# Patient Record
Sex: MALE | NOT HISPANIC OR LATINO | Employment: OTHER | URBAN - METROPOLITAN AREA
[De-identification: names, ages, dates, MRNs, and addresses within clinical notes are randomized per-mention and may not be internally consistent; named-entity substitution may affect disease eponyms.]

---

## 2023-05-19 ENCOUNTER — HOSPITAL ENCOUNTER (OUTPATIENT)
Dept: DATA CONVERSION | Facility: HOSPITAL | Age: 83
End: 2023-05-19
Attending: ORTHOPAEDIC SURGERY | Admitting: ORTHOPAEDIC SURGERY
Payer: MEDICARE

## 2023-05-19 DIAGNOSIS — G56.03 CARPAL TUNNEL SYNDROME, BILATERAL UPPER LIMBS: ICD-10-CM

## 2023-05-19 DIAGNOSIS — G56.01 CARPAL TUNNEL SYNDROME, RIGHT UPPER LIMB: ICD-10-CM

## 2023-06-21 ENCOUNTER — OFFICE VISIT (OUTPATIENT)
Dept: PRIMARY CARE | Facility: CLINIC | Age: 83
End: 2023-06-21
Payer: MEDICARE

## 2023-06-21 VITALS
OXYGEN SATURATION: 93 % | BODY MASS INDEX: 36.33 KG/M2 | DIASTOLIC BLOOD PRESSURE: 70 MMHG | WEIGHT: 246 LBS | SYSTOLIC BLOOD PRESSURE: 135 MMHG | HEART RATE: 108 BPM

## 2023-06-21 DIAGNOSIS — I10 ESSENTIAL HYPERTENSION: ICD-10-CM

## 2023-06-21 DIAGNOSIS — M79.602 PAIN IN BOTH UPPER EXTREMITIES: Primary | ICD-10-CM

## 2023-06-21 DIAGNOSIS — M79.601 PAIN IN BOTH UPPER EXTREMITIES: Primary | ICD-10-CM

## 2023-06-21 DIAGNOSIS — E78.5 HYPERLIPIDEMIA, UNSPECIFIED HYPERLIPIDEMIA TYPE: ICD-10-CM

## 2023-06-21 PROBLEM — I89.0 LYMPHEDEMA OF BOTH LOWER EXTREMITIES: Status: ACTIVE | Noted: 2023-06-21

## 2023-06-21 PROBLEM — G56.02 CARPAL TUNNEL SYNDROME OF LEFT WRIST: Status: ACTIVE | Noted: 2023-06-21

## 2023-06-21 PROBLEM — I49.9 ARRHYTHMIA: Status: ACTIVE | Noted: 2023-06-21

## 2023-06-21 PROBLEM — F41.9 ANXIETY: Status: ACTIVE | Noted: 2018-11-12

## 2023-06-21 PROBLEM — E87.6 HYPOKALEMIA: Status: ACTIVE | Noted: 2018-11-12

## 2023-06-21 PROBLEM — R20.0 BILATERAL HAND NUMBNESS: Status: RESOLVED | Noted: 2023-06-21 | Resolved: 2023-06-21

## 2023-06-21 PROBLEM — M79.89 RIGHT LEG SWELLING: Status: ACTIVE | Noted: 2023-06-21

## 2023-06-21 PROBLEM — I25.10 CORONARY ARTERY DISEASE: Status: ACTIVE | Noted: 2023-06-21

## 2023-06-21 PROBLEM — G56.01 CARPAL TUNNEL SYNDROME OF RIGHT WRIST: Status: ACTIVE | Noted: 2023-06-21

## 2023-06-21 PROBLEM — R20.0 BILATERAL HAND NUMBNESS: Status: ACTIVE | Noted: 2023-06-21

## 2023-06-21 PROBLEM — I89.0 LYMPHEDEMA: Status: ACTIVE | Noted: 2023-06-21

## 2023-06-21 PROBLEM — C85.90 NON-HODGKIN'S LYMPHOMA (MULTI): Status: ACTIVE | Noted: 2019-07-29

## 2023-06-21 PROBLEM — E55.9 VITAMIN D DEFICIENCY: Status: ACTIVE | Noted: 2018-11-12

## 2023-06-21 PROBLEM — R74.8 ELEVATED LIVER ENZYMES: Status: ACTIVE | Noted: 2018-11-12

## 2023-06-21 PROCEDURE — 99214 OFFICE O/P EST MOD 30 MIN: CPT | Performed by: INTERNAL MEDICINE

## 2023-06-21 PROCEDURE — 1036F TOBACCO NON-USER: CPT | Performed by: INTERNAL MEDICINE

## 2023-06-21 PROCEDURE — 3075F SYST BP GE 130 - 139MM HG: CPT | Performed by: INTERNAL MEDICINE

## 2023-06-21 PROCEDURE — 3078F DIAST BP <80 MM HG: CPT | Performed by: INTERNAL MEDICINE

## 2023-06-21 PROCEDURE — 1160F RVW MEDS BY RX/DR IN RCRD: CPT | Performed by: INTERNAL MEDICINE

## 2023-06-21 PROCEDURE — 1159F MED LIST DOCD IN RCRD: CPT | Performed by: INTERNAL MEDICINE

## 2023-06-21 RX ORDER — CLORAZEPATE DIPOTASSIUM 15 MG/1
TABLET ORAL
COMMUNITY
Start: 2021-05-17 | End: 2023-10-16 | Stop reason: SDUPTHER

## 2023-06-21 RX ORDER — LOSARTAN POTASSIUM 50 MG/1
TABLET ORAL
COMMUNITY
End: 2023-08-10

## 2023-06-21 RX ORDER — ATORVASTATIN CALCIUM 40 MG/1
TABLET, FILM COATED ORAL
COMMUNITY
Start: 2021-11-04 | End: 2024-02-15 | Stop reason: SDUPTHER

## 2023-06-21 RX ORDER — PREDNISONE 20 MG/1
20 TABLET ORAL 2 TIMES DAILY
Qty: 10 TABLET | Refills: 0 | Status: SHIPPED | OUTPATIENT
Start: 2023-06-21 | End: 2023-06-25 | Stop reason: SDUPTHER

## 2023-06-21 NOTE — PROGRESS NOTES
Subjective   Patient ID: Angel Garibay is a 83 y.o. male who presents for Insomnia and pain in both arms .    HPI     S/p b/l carpal tunnel surgery on 5/19/23-numbness and wrist pain has since improved  Now reports b/l upper ext pain, goes from the shoulder to his wrist x 2-3 weeks  Moving his arms makes the pain worse  No numbness or weakness  Went to the ED, was given Percocet which helped      Review of Systems   All other systems reviewed and are negative.      Objective   /80   Pulse 108   Wt 112 kg (246 lb)   SpO2 93%   BMI 36.33 kg/m²     Physical Exam  Constitutional:       Appearance: Normal appearance.   HENT:      Head: Normocephalic and atraumatic.   Cardiovascular:      Rate and Rhythm: Normal rate and regular rhythm.      Heart sounds: Normal heart sounds. No murmur heard.     No gallop.   Pulmonary:      Effort: Pulmonary effort is normal. No respiratory distress.      Breath sounds: No wheezing or rales.   Skin:     General: Skin is warm and dry.      Findings: No rash.   Neurological:      Mental Status: He is alert and oriented to person, place, and time. Mental status is at baseline.   Psychiatric:         Mood and Affect: Mood normal.         Behavior: Behavior normal.         Assessment/Plan       #b/l UE pain  -?shoulder etiology. Rx prednisone 20mg BID x 5 days  -Refer to ortho     #HTN  -Well contolled. Con losartan 50mg daily     #HLD  -Cont atorvastatin 40mg daily , check lipid panel     #Anxiety   -Cont clorazepate -no rx today     FBW ordered     RTC 6 mo

## 2023-06-21 NOTE — PATIENT INSTRUCTIONS
Take prednisone   Ortho [phone]    Please complete fasting blood work. Fast for 10 hours, black coffee and water the morning of labs are OK.

## 2023-06-22 ENCOUNTER — LAB (OUTPATIENT)
Dept: LAB | Facility: LAB | Age: 83
End: 2023-06-22
Payer: MEDICARE

## 2023-06-22 DIAGNOSIS — E78.5 HYPERLIPIDEMIA, UNSPECIFIED HYPERLIPIDEMIA TYPE: ICD-10-CM

## 2023-06-22 LAB
ALANINE AMINOTRANSFERASE (SGPT) (U/L) IN SER/PLAS: 26 U/L (ref 10–52)
ALBUMIN (G/DL) IN SER/PLAS: 3.9 G/DL (ref 3.4–5)
ALKALINE PHOSPHATASE (U/L) IN SER/PLAS: 113 U/L (ref 33–136)
ANION GAP IN SER/PLAS: 13 MMOL/L (ref 10–20)
ASPARTATE AMINOTRANSFERASE (SGOT) (U/L) IN SER/PLAS: 21 U/L (ref 9–39)
BILIRUBIN TOTAL (MG/DL) IN SER/PLAS: 0.7 MG/DL (ref 0–1.2)
CALCIUM (MG/DL) IN SER/PLAS: 9.1 MG/DL (ref 8.6–10.3)
CARBON DIOXIDE, TOTAL (MMOL/L) IN SER/PLAS: 26 MMOL/L (ref 21–32)
CHLORIDE (MMOL/L) IN SER/PLAS: 103 MMOL/L (ref 98–107)
CHOLESTEROL (MG/DL) IN SER/PLAS: 193 MG/DL (ref 0–199)
CHOLESTEROL IN HDL (MG/DL) IN SER/PLAS: 67.9 MG/DL
CHOLESTEROL/HDL RATIO: 2.8
CREATININE (MG/DL) IN SER/PLAS: 0.87 MG/DL (ref 0.5–1.3)
ERYTHROCYTE DISTRIBUTION WIDTH (RATIO) BY AUTOMATED COUNT: 12.3 % (ref 11.5–14.5)
ERYTHROCYTE MEAN CORPUSCULAR HEMOGLOBIN CONCENTRATION (G/DL) BY AUTOMATED: 33.2 G/DL (ref 32–36)
ERYTHROCYTE MEAN CORPUSCULAR VOLUME (FL) BY AUTOMATED COUNT: 101 FL (ref 80–100)
ERYTHROCYTES (10*6/UL) IN BLOOD BY AUTOMATED COUNT: 3.75 X10E12/L (ref 4.5–5.9)
GFR MALE: 85 ML/MIN/1.73M2
GLUCOSE (MG/DL) IN SER/PLAS: 98 MG/DL (ref 74–99)
HEMATOCRIT (%) IN BLOOD BY AUTOMATED COUNT: 37.7 % (ref 41–52)
HEMOGLOBIN (G/DL) IN BLOOD: 12.5 G/DL (ref 13.5–17.5)
LDL: 111 MG/DL (ref 0–99)
LEUKOCYTES (10*3/UL) IN BLOOD BY AUTOMATED COUNT: 8.7 X10E9/L (ref 4.4–11.3)
PLATELETS (10*3/UL) IN BLOOD AUTOMATED COUNT: 318 X10E9/L (ref 150–450)
POTASSIUM (MMOL/L) IN SER/PLAS: 3.8 MMOL/L (ref 3.5–5.3)
PROTEIN TOTAL: 7 G/DL (ref 6.4–8.2)
SODIUM (MMOL/L) IN SER/PLAS: 138 MMOL/L (ref 136–145)
TRIGLYCERIDE (MG/DL) IN SER/PLAS: 73 MG/DL (ref 0–149)
UREA NITROGEN (MG/DL) IN SER/PLAS: 11 MG/DL (ref 6–23)
VLDL: 15 MG/DL (ref 0–40)

## 2023-06-22 PROCEDURE — 85027 COMPLETE CBC AUTOMATED: CPT

## 2023-06-22 PROCEDURE — 80053 COMPREHEN METABOLIC PANEL: CPT

## 2023-06-22 PROCEDURE — 80061 LIPID PANEL: CPT

## 2023-06-22 PROCEDURE — 36415 COLL VENOUS BLD VENIPUNCTURE: CPT

## 2023-06-25 DIAGNOSIS — D64.9 ANEMIA, UNSPECIFIED TYPE: Primary | ICD-10-CM

## 2023-06-25 DIAGNOSIS — M79.602 PAIN IN BOTH UPPER EXTREMITIES: ICD-10-CM

## 2023-06-25 DIAGNOSIS — M79.601 PAIN IN BOTH UPPER EXTREMITIES: ICD-10-CM

## 2023-06-25 RX ORDER — PREDNISONE 20 MG/1
20 TABLET ORAL 2 TIMES DAILY
Qty: 10 TABLET | Refills: 0 | Status: SHIPPED | OUTPATIENT
Start: 2023-06-25 | End: 2023-06-30

## 2023-07-28 ENCOUNTER — TELEPHONE (OUTPATIENT)
Dept: PRIMARY CARE | Facility: CLINIC | Age: 83
End: 2023-07-28
Payer: MEDICARE

## 2023-07-28 DIAGNOSIS — M79.602 PAIN IN BOTH UPPER EXTREMITIES: Primary | ICD-10-CM

## 2023-07-28 DIAGNOSIS — M79.601 PAIN IN BOTH UPPER EXTREMITIES: Primary | ICD-10-CM

## 2023-07-28 DIAGNOSIS — M79.601 PAIN IN BOTH UPPER EXTREMITIES: ICD-10-CM

## 2023-07-28 DIAGNOSIS — M79.602 PAIN IN BOTH UPPER EXTREMITIES: ICD-10-CM

## 2023-07-28 DIAGNOSIS — Z79.899 MEDICATION MANAGEMENT: ICD-10-CM

## 2023-07-28 RX ORDER — PREDNISONE 20 MG/1
20 TABLET ORAL 2 TIMES DAILY
Qty: 10 TABLET | Refills: 0 | Status: SHIPPED | OUTPATIENT
Start: 2023-07-28 | End: 2023-08-07 | Stop reason: ALTCHOICE

## 2023-07-28 RX ORDER — CLORAZEPATE DIPOTASSIUM 15 MG/1
TABLET ORAL
Qty: 90 TABLET | Refills: 0 | Status: CANCELLED | OUTPATIENT
Start: 2023-07-28

## 2023-07-28 NOTE — TELEPHONE ENCOUNTER
Requested Prescriptions     Pending Prescriptions Disp Refills    clorazepate (Tranxene) 15 mg tablet 90 tablet 0     Sig: clorazepate dipotassium 15 mg tablet   take 1 tablet by mouth once daily if needed

## 2023-08-03 PROBLEM — E78.00 PURE HYPERCHOLESTEROLEMIA: Status: ACTIVE | Noted: 2023-08-03

## 2023-08-03 RX ORDER — ASPIRIN 81 MG/1
1 TABLET ORAL DAILY
COMMUNITY
Start: 2021-11-04 | End: 2023-10-16 | Stop reason: ALTCHOICE

## 2023-08-03 RX ORDER — GABAPENTIN 100 MG/1
100 CAPSULE ORAL NIGHTLY
COMMUNITY
Start: 2023-06-26 | End: 2023-10-16 | Stop reason: ALTCHOICE

## 2023-08-07 ENCOUNTER — OFFICE VISIT (OUTPATIENT)
Dept: PRIMARY CARE | Facility: CLINIC | Age: 83
End: 2023-08-07
Payer: MEDICARE

## 2023-08-07 VITALS
DIASTOLIC BLOOD PRESSURE: 76 MMHG | WEIGHT: 241 LBS | SYSTOLIC BLOOD PRESSURE: 135 MMHG | HEIGHT: 68 IN | HEART RATE: 84 BPM | OXYGEN SATURATION: 93 % | BODY MASS INDEX: 36.53 KG/M2

## 2023-08-07 DIAGNOSIS — G56.03 BILATERAL CARPAL TUNNEL SYNDROME: ICD-10-CM

## 2023-08-07 DIAGNOSIS — F41.9 ANXIETY: ICD-10-CM

## 2023-08-07 DIAGNOSIS — G47.00 INSOMNIA, UNSPECIFIED TYPE: ICD-10-CM

## 2023-08-07 DIAGNOSIS — Z79.899 ENCOUNTER FOR MEDICATION MANAGEMENT: ICD-10-CM

## 2023-08-07 DIAGNOSIS — Z00.00 ROUTINE GENERAL MEDICAL EXAMINATION AT HEALTH CARE FACILITY: Primary | ICD-10-CM

## 2023-08-07 PROCEDURE — 80307 DRUG TEST PRSMV CHEM ANLYZR: CPT

## 2023-08-07 PROCEDURE — 1159F MED LIST DOCD IN RCRD: CPT | Performed by: INTERNAL MEDICINE

## 2023-08-07 PROCEDURE — G0439 PPPS, SUBSEQ VISIT: HCPCS | Performed by: INTERNAL MEDICINE

## 2023-08-07 PROCEDURE — 99214 OFFICE O/P EST MOD 30 MIN: CPT | Performed by: INTERNAL MEDICINE

## 2023-08-07 PROCEDURE — 3078F DIAST BP <80 MM HG: CPT | Performed by: INTERNAL MEDICINE

## 2023-08-07 PROCEDURE — 1170F FXNL STATUS ASSESSED: CPT | Performed by: INTERNAL MEDICINE

## 2023-08-07 PROCEDURE — 80373 DRUG SCREENING TRAMADOL: CPT

## 2023-08-07 PROCEDURE — 1160F RVW MEDS BY RX/DR IN RCRD: CPT | Performed by: INTERNAL MEDICINE

## 2023-08-07 PROCEDURE — 80361 OPIATES 1 OR MORE: CPT

## 2023-08-07 PROCEDURE — 1036F TOBACCO NON-USER: CPT | Performed by: INTERNAL MEDICINE

## 2023-08-07 PROCEDURE — 80368 SEDATIVE HYPNOTICS: CPT

## 2023-08-07 PROCEDURE — 80358 DRUG SCREENING METHADONE: CPT

## 2023-08-07 PROCEDURE — 80354 DRUG SCREENING FENTANYL: CPT

## 2023-08-07 PROCEDURE — 3075F SYST BP GE 130 - 139MM HG: CPT | Performed by: INTERNAL MEDICINE

## 2023-08-07 PROCEDURE — 80365 DRUG SCREENING OXYCODONE: CPT

## 2023-08-07 PROCEDURE — 80346 BENZODIAZEPINES1-12: CPT

## 2023-08-07 RX ORDER — PREDNISONE 10 MG/1
10 TABLET ORAL DAILY
Qty: 10 TABLET | Refills: 0 | Status: SHIPPED | OUTPATIENT
Start: 2023-08-07 | End: 2023-10-16 | Stop reason: ALTCHOICE

## 2023-08-07 RX ORDER — LORAZEPAM 0.5 MG/1
0.5 TABLET ORAL 2 TIMES DAILY PRN
Qty: 60 TABLET | Refills: 0 | Status: SHIPPED | OUTPATIENT
Start: 2023-08-07 | End: 2023-10-16 | Stop reason: ALTCHOICE

## 2023-08-07 RX ORDER — PREDNISONE 10 MG/1
10 TABLET ORAL DAILY
COMMUNITY
End: 2023-08-07 | Stop reason: SDUPTHER

## 2023-08-07 ASSESSMENT — ANXIETY QUESTIONNAIRES
1. FEELING NERVOUS, ANXIOUS, OR ON EDGE: SEVERAL DAYS
4. TROUBLE RELAXING: MORE THAN HALF THE DAYS
IF YOU CHECKED OFF ANY PROBLEMS ON THIS QUESTIONNAIRE, HOW DIFFICULT HAVE THESE PROBLEMS MADE IT FOR YOU TO DO YOUR WORK, TAKE CARE OF THINGS AT HOME, OR GET ALONG WITH OTHER PEOPLE: SOMEWHAT DIFFICULT
2. NOT BEING ABLE TO STOP OR CONTROL WORRYING: NOT AT ALL
3. WORRYING TOO MUCH ABOUT DIFFERENT THINGS: MORE THAN HALF THE DAYS
7. FEELING AFRAID AS IF SOMETHING AWFUL MIGHT HAPPEN: SEVERAL DAYS
6. BECOMING EASILY ANNOYED OR IRRITABLE: SEVERAL DAYS
GAD7 TOTAL SCORE: 7
5. BEING SO RESTLESS THAT IT IS HARD TO SIT STILL: NOT AT ALL

## 2023-08-07 ASSESSMENT — PATIENT HEALTH QUESTIONNAIRE - PHQ9
2. FEELING DOWN, DEPRESSED OR HOPELESS: NOT AT ALL
SUM OF ALL RESPONSES TO PHQ9 QUESTIONS 1 AND 2: 0
1. LITTLE INTEREST OR PLEASURE IN DOING THINGS: NOT AT ALL

## 2023-08-07 ASSESSMENT — ACTIVITIES OF DAILY LIVING (ADL)
DRESSING: INDEPENDENT
BATHING: INDEPENDENT
GROCERY_SHOPPING: INDEPENDENT
MANAGING_FINANCES: INDEPENDENT
DOING_HOUSEWORK: INDEPENDENT
TAKING_MEDICATION: INDEPENDENT

## 2023-08-07 NOTE — PROGRESS NOTES
"Subjective   Patient ID: Angel Garibay is a 83 y.o. male who presents for Medicare Annual Wellness Visit Subsequent.    HPI     Review of Systems    Objective   /76   Pulse 84   Ht 1.727 m (5' 8\")   Wt 109 kg (241 lb)   SpO2 93%   BMI 36.64 kg/m²     Physical Exam    Assessment/Plan          "

## 2023-08-07 NOTE — PATIENT INSTRUCTIONS
Lets try lorazepam 0.5mg twice daily instead of clorazepate - let me know how this works   Get nonfasting labs to investigate anemia   Prednisone sent to [facility]     Come back 3 months

## 2023-08-07 NOTE — PROGRESS NOTES
"Subjective   Reason for Visit: Angel Garibay is an 83 y.o. male here for a Medicare Wellness visit.             HPI    Patient Care Team:  Wilbur Jurado,  as PCP - General     Needs a refill on clorazepate. Has been taking for 30+ years. Takes for anxiety and sleep. Typically takes 1 tablet daily. No panic attacks. Great.y helps with his sleep and improves his quality of life. No med side effects. He does report the medication is expensive.     Requesting new prenisone rx for carpal tunnel     Benzodiazepines:  What is the patient's goal of therapy? Improve anxiety and insomnia  Is this being achieved with current treatment? yes    GEORGETTE-7:  Over the last 2 weeks, how often have you been bothered by any of the following problems?  Feeling nervous, anxious, or on edge: 1  Not being able to stop or control worryin  Worrying too much about different things: 2  Trouble relaxin  Being so restless that it is hard to sit still: 0  Becoming easily annoyed or irritable: 1  Feeling afraid as if something awful might happen: 1  GEORGETTE-7 Total Score: 7        Activities of Daily Living:   Is your overall impression that this patient is benefiting (symptom reduction outweighs side effects) from benzodiazepine therapy? Yes     1. Physical Functioning: Same  2. Family Relationship: Same  3. Social Relationship: Same  4. Mood: Same  5. Sleep Patterns: Same  6. Overall Function: Same    Review of Systems   All other systems reviewed and are negative.      Objective   Vitals:  /76   Pulse 84   Ht 1.727 m (5' 8\")   Wt 109 kg (241 lb)   SpO2 93%   BMI 36.64 kg/m²       Physical Exam  Constitutional:       Appearance: Normal appearance.   HENT:      Head: Normocephalic and atraumatic.   Cardiovascular:      Rate and Rhythm: Normal rate and regular rhythm.      Heart sounds: Normal heart sounds. No murmur heard.     No gallop.   Pulmonary:      Effort: Pulmonary effort is normal. No respiratory distress.      Breath " sounds: No wheezing or rales.   Skin:     General: Skin is warm and dry.      Findings: No rash.   Neurological:      Mental Status: He is alert and oriented to person, place, and time. Mental status is at baseline.   Psychiatric:         Mood and Affect: Mood normal.         Behavior: Behavior normal.         Assessment/Plan   #Anxiety and insomnia   -GAD7: 7  -Due to long half life of clorazepate and cost, will transition patient to lorazepam 0.5mg BID  -UDS and CSA today   -I have personally reviewed the OARRS report for . This report is scanned into the electronic medical record. I have considered the risks of abuse, dependence, addiction and diversion.    #Carpal tunnel  -Rx prednisone 10mg daily x 10 days then stop     Reminded patient to complete aneima labs     RTC 3 mo

## 2023-08-09 DIAGNOSIS — I10 ESSENTIAL HYPERTENSION: ICD-10-CM

## 2023-08-10 LAB
6-ACETYLMORPHINE: <25 NG/ML
7-AMINOCLONAZEPAM: <25 NG/ML
ALPHA-HYDROXYALPRAZOLAM: <25 NG/ML
ALPHA-HYDROXYMIDAZOLAM: <25 NG/ML
ALPRAZOLAM: <25 NG/ML
AMPHETAMINE (PRESENCE) IN URINE BY SCREEN METHOD: ABNORMAL
BARBITURATES PRESENCE IN URINE BY SCREEN METHOD: ABNORMAL
CANNABINOIDS IN URINE BY SCREEN METHOD: ABNORMAL
CHLORDIAZEPOXIDE: <25 NG/ML
CLONAZEPAM: <25 NG/ML
COCAINE (PRESENCE) IN URINE BY SCREEN METHOD: ABNORMAL
CODEINE: <50 NG/ML
CREATINE, URINE FOR DRUG: 122.8 MG/DL
DIAZEPAM: <25 NG/ML
DRUG SCREEN COMMENT URINE: ABNORMAL
EDDP: <25 NG/ML
FENTANYL CONFIRMATION, URINE: <2.5 NG/ML
HYDROCODONE: <25 NG/ML
HYDROMORPHONE: <25 NG/ML
LORAZEPAM: <25 NG/ML
METHADONE CONFIRMATION,URINE: <25 NG/ML
MIDAZOLAM: <25 NG/ML
MORPHINE URINE: <50 NG/ML
NORDIAZEPAM: 35 NG/ML
NORFENTANYL: <2.5 NG/ML
NORHYDROCODONE: <25 NG/ML
NOROXYCODONE: <25 NG/ML
O-DESMETHYLTRAMADOL: <50 NG/ML
OXAZEPAM: 991 NG/ML
OXYCODONE: <25 NG/ML
OXYMORPHONE: <25 NG/ML
PHENCYCLIDINE (PRESENCE) IN URINE BY SCREEN METHOD: ABNORMAL
TEMAZEPAM: <25 NG/ML
TRAMADOL: <50 NG/ML
ZOLPIDEM METABOLITE (ZCA): <25 NG/ML
ZOLPIDEM: <25 NG/ML

## 2023-08-10 RX ORDER — LOSARTAN POTASSIUM 50 MG/1
50 TABLET ORAL DAILY
Qty: 90 TABLET | Refills: 1 | Status: SHIPPED | OUTPATIENT
Start: 2023-08-10 | End: 2023-12-21

## 2023-09-30 NOTE — H&P
History & Physical Reviewed:   I have reviewed the History and Physical dated:  01-May-2023   History and Physical reviewed and relevant findings noted. Patient examined to review pertinent physical  findings.: No significant changes   Home Medications Reviewed: no changes noted   Allergies Reviewed: no changes noted       ERAS (Enhanced Recovery After Surgery):  ·  ERAS Patient: no     Consent:   COVID-19 Consent:  ·  COVID-19 Risk Consent Surgeon has reviewed key risks related to the risk of cristhian COVID-19 and if they contract COVID-19 what the risks are.     Attestation:   Note Completion:  I am a:  Resident/Fellow   Attending Attestation I saw and evaluated the patient.  I personally obtained the key and critical portions of the history and physical exam or was physically present for key and  critical portions performed by the resident/fellow. I reviewed the resident/fellow?s documentation and discussed the patient with the resident/fellow.  I agree with the resident/fellow?s medical decision making as documented in the note.     I personally evaluated the patient on 19-May-2023         Electronic Signatures:  Zhao [email or web address] (Resident))  (Signed 19-May-2023 06:26)   Authored: History & Physical Reviewed, ERAS, Consent,  Note Completion  Ryan Gordillo)  (Signed 19-May-2023 15:49)   Authored: Note Completion   Co-Signer: History & Physical Reviewed, ERAS, Consent, Note Completion      Last Updated: 19-May-2023 15:49 by Ryan Gordillo)

## 2023-10-16 ENCOUNTER — OFFICE VISIT (OUTPATIENT)
Dept: PRIMARY CARE | Facility: CLINIC | Age: 83
End: 2023-10-16
Payer: MEDICARE

## 2023-10-16 VITALS
DIASTOLIC BLOOD PRESSURE: 72 MMHG | BODY MASS INDEX: 38.47 KG/M2 | WEIGHT: 253 LBS | HEART RATE: 106 BPM | SYSTOLIC BLOOD PRESSURE: 133 MMHG | OXYGEN SATURATION: 96 %

## 2023-10-16 DIAGNOSIS — G47.00 INSOMNIA, UNSPECIFIED TYPE: Primary | ICD-10-CM

## 2023-10-16 DIAGNOSIS — I10 ESSENTIAL HYPERTENSION: ICD-10-CM

## 2023-10-16 PROCEDURE — 1159F MED LIST DOCD IN RCRD: CPT | Performed by: INTERNAL MEDICINE

## 2023-10-16 PROCEDURE — 1036F TOBACCO NON-USER: CPT | Performed by: INTERNAL MEDICINE

## 2023-10-16 PROCEDURE — 3078F DIAST BP <80 MM HG: CPT | Performed by: INTERNAL MEDICINE

## 2023-10-16 PROCEDURE — 1160F RVW MEDS BY RX/DR IN RCRD: CPT | Performed by: INTERNAL MEDICINE

## 2023-10-16 PROCEDURE — 99214 OFFICE O/P EST MOD 30 MIN: CPT | Performed by: INTERNAL MEDICINE

## 2023-10-16 PROCEDURE — 3075F SYST BP GE 130 - 139MM HG: CPT | Performed by: INTERNAL MEDICINE

## 2023-10-16 RX ORDER — CLORAZEPATE DIPOTASSIUM 15 MG/1
TABLET ORAL
Qty: 90 TABLET | Refills: 0 | Status: SHIPPED | OUTPATIENT
Start: 2023-10-16 | End: 2024-01-03 | Stop reason: ALTCHOICE

## 2023-10-16 RX ORDER — METOPROLOL SUCCINATE 50 MG/1
TABLET, EXTENDED RELEASE ORAL
COMMUNITY
End: 2023-10-16 | Stop reason: SDUPTHER

## 2023-10-16 RX ORDER — METOPROLOL SUCCINATE 50 MG/1
50 TABLET, EXTENDED RELEASE ORAL DAILY
Qty: 90 TABLET | Refills: 3 | Status: SHIPPED | OUTPATIENT
Start: 2023-10-16 | End: 2023-12-14 | Stop reason: ALTCHOICE

## 2023-10-16 NOTE — PROGRESS NOTES
Subjective   Patient ID: Angel Garibay is a 83 y.o. male who presents for Follow-up (3 month follow up).  Started metoprolol 2 weeks ago due to hypertension      HPI     Reports lorazepam was not helpful for anxiety and insomnia. Is now take chlorazepate again which is helpful for his sleep. Greatly improves his quality of life. No memory loss or balance issues. No other med side effects. No signs of abuse or misuse     Starting taking Toprol for BP --bp has been good     Review of Systems   All other systems reviewed and are negative.      Objective   There were no vitals taken for this visit.    Physical Exam  Constitutional:       Appearance: Normal appearance.   HENT:      Head: Normocephalic and atraumatic.   Cardiovascular:      Rate and Rhythm: Normal rate and regular rhythm.      Heart sounds: Normal heart sounds. No murmur heard.     No gallop.   Pulmonary:      Effort: Pulmonary effort is normal. No respiratory distress.      Breath sounds: No wheezing or rales.   Skin:     General: Skin is warm and dry.      Findings: No rash.   Neurological:      Mental Status: He is alert and oriented to person, place, and time. Mental status is at baseline.   Psychiatric:         Mood and Affect: Mood normal.         Behavior: Behavior normal.         Assessment/Plan         #Anxiety and insomnia   -GAD7: 18  -Restart clorazepate 15mg daily prn as lorazepam not effective.   -UDS 8/7/23  -CSA 8/7/23  -I have personally reviewed the OARRS report for . This report is scanned into the electronic medical record. I have considered the risks of abuse, dependence, addiction and diversion.     #HTN  -Well contolled. Con losartan 50mg daily and metoprolol XL 50mg daily     #Anemia  -Reminded patient to completed repeat CBC and anemia labs     Not assessed:     #HLD  -Cont atorvastatin 40mg daily

## 2023-11-13 ENCOUNTER — TELEPHONE (OUTPATIENT)
Dept: HEMATOLOGY/ONCOLOGY | Facility: CLINIC | Age: 83
End: 2023-11-13
Payer: MEDICARE

## 2023-11-13 DIAGNOSIS — C85.90 NON-HODGKIN'S LYMPHOMA, UNSPECIFIED BODY REGION, UNSPECIFIED NON-HODGKIN LYMPHOMA TYPE (MULTI): Primary | ICD-10-CM

## 2023-11-13 NOTE — TELEPHONE ENCOUNTER
"Pt called and states he noticed a lump on the right side of his jaw approximately 2-3 months ago.  He states it \"kind of floats, no pain and it has stayed around the same size, a little bigger than a pea.\"  He also notes some overall body aches, sleeps in a chair as his shoulder are sore.  He also notes edema to ankles.     Pt wondering if he should be seen earlier for this lump?  Did have carpal tunnel surgery approximately 3 months ago.   "

## 2023-11-13 NOTE — TELEPHONE ENCOUNTER
Per [facility], CT of neck order with [facility] and Send sent to P[facility].

## 2023-11-20 ENCOUNTER — HOSPITAL ENCOUNTER (OUTPATIENT)
Dept: RADIOLOGY | Facility: HOSPITAL | Age: 83
Discharge: HOME | End: 2023-11-20
Payer: MEDICARE

## 2023-11-20 DIAGNOSIS — C82.81 OTHER TYPE OF FOLLICULAR LYMPHOMA OF LYMPH NODES OF NECK (MULTI): Primary | ICD-10-CM

## 2023-11-20 DIAGNOSIS — C85.90 NON-HODGKIN'S LYMPHOMA, UNSPECIFIED BODY REGION, UNSPECIFIED NON-HODGKIN LYMPHOMA TYPE (MULTI): ICD-10-CM

## 2023-11-20 PROCEDURE — 70491 CT SOFT TISSUE NECK W/DYE: CPT

## 2023-11-20 PROCEDURE — 2550000001 HC RX 255 CONTRASTS: Performed by: PHYSICIAN ASSISTANT

## 2023-11-20 PROCEDURE — 82565 ASSAY OF CREATININE: CPT | Mod: OUT

## 2023-11-20 PROCEDURE — 70491 CT SOFT TISSUE NECK W/DYE: CPT | Performed by: RADIOLOGY

## 2023-11-20 RX ADMIN — IOHEXOL 70 ML: 350 INJECTION, SOLUTION INTRAVENOUS at 10:25

## 2023-11-22 NOTE — TELEPHONE ENCOUNTER
Pt called, LM to return call.  Pt had been called by IR and he had questions regarding his biopsy being done in Radiology vs surgery.  He did not want to schedule until speaking to someone from [facility].

## 2023-12-04 ENCOUNTER — APPOINTMENT (OUTPATIENT)
Dept: RADIOLOGY | Facility: HOSPITAL | Age: 83
End: 2023-12-04
Payer: MEDICARE

## 2023-12-12 ENCOUNTER — HOSPITAL ENCOUNTER (OUTPATIENT)
Dept: RADIOLOGY | Facility: HOSPITAL | Age: 83
Discharge: HOME | End: 2023-12-12
Payer: MEDICARE

## 2023-12-12 ENCOUNTER — APPOINTMENT (OUTPATIENT)
Dept: RADIOLOGY | Facility: HOSPITAL | Age: 83
End: 2023-12-12
Payer: MEDICARE

## 2023-12-12 ENCOUNTER — HOSPITAL ENCOUNTER (OUTPATIENT)
Dept: RADIOLOGY | Facility: HOSPITAL | Age: 83
End: 2023-12-12
Payer: MEDICARE

## 2023-12-12 DIAGNOSIS — C82.81 OTHER TYPE OF FOLLICULAR LYMPHOMA OF LYMPH NODES OF NECK (MULTI): ICD-10-CM

## 2023-12-12 PROCEDURE — 78815 PET IMAGE W/CT SKULL-THIGH: CPT | Mod: PET TUMOR SUBSQ TX STRATEGY | Performed by: NUCLEAR MEDICINE

## 2023-12-12 PROCEDURE — 3430000001 HC RX 343 DIAGNOSTIC RADIOPHARMACEUTICALS: Performed by: PHYSICIAN ASSISTANT

## 2023-12-12 PROCEDURE — A9552 F18 FDG: HCPCS | Performed by: PHYSICIAN ASSISTANT

## 2023-12-12 PROCEDURE — 78815 PET IMAGE W/CT SKULL-THIGH: CPT | Mod: PS

## 2023-12-12 RX ORDER — FLUDEOXYGLUCOSE F 18 200 MCI/ML
10.49 INJECTION, SOLUTION INTRAVENOUS
Status: COMPLETED | OUTPATIENT
Start: 2023-12-12 | End: 2023-12-12

## 2023-12-12 RX ADMIN — FLUDEOXYGLUCOSE F 18 10.49 MILLICURIE: 200 INJECTION, SOLUTION INTRAVENOUS at 13:05

## 2023-12-14 ENCOUNTER — ANCILLARY PROCEDURE (OUTPATIENT)
Dept: RADIOLOGY | Facility: HOSPITAL | Age: 83
End: 2023-12-14
Payer: MEDICARE

## 2023-12-14 VITALS
DIASTOLIC BLOOD PRESSURE: 62 MMHG | OXYGEN SATURATION: 98 % | HEART RATE: 95 BPM | RESPIRATION RATE: 18 BRPM | SYSTOLIC BLOOD PRESSURE: 131 MMHG

## 2023-12-14 DIAGNOSIS — C82.81 OTHER TYPE OF FOLLICULAR LYMPHOMA OF LYMPH NODES OF NECK (MULTI): ICD-10-CM

## 2023-12-14 PROCEDURE — 2720000007 HC OR 272 NO HCPCS

## 2023-12-14 PROCEDURE — 88185 FLOWCYTOMETRY/TC ADD-ON: CPT | Mod: TC | Performed by: PHYSICIAN ASSISTANT

## 2023-12-14 PROCEDURE — 88342 IMHCHEM/IMCYTCHM 1ST ANTB: CPT | Mod: TC,SUR,GEALAB | Performed by: PHYSICIAN ASSISTANT

## 2023-12-14 PROCEDURE — 88341 IMHCHEM/IMCYTCHM EA ADD ANTB: CPT | Mod: TC,SUR,GEALAB | Performed by: PHYSICIAN ASSISTANT

## 2023-12-14 PROCEDURE — 38505 NEEDLE BIOPSY LYMPH NODES: CPT

## 2023-12-14 PROCEDURE — 88341 IMHCHEM/IMCYTCHM EA ADD ANTB: CPT | Performed by: PATHOLOGY

## 2023-12-14 PROCEDURE — 88342 IMHCHEM/IMCYTCHM 1ST ANTB: CPT | Performed by: PATHOLOGY

## 2023-12-14 PROCEDURE — 76942 ECHO GUIDE FOR BIOPSY: CPT | Performed by: RADIOLOGY

## 2023-12-14 PROCEDURE — 88189 FLOWCYTOMETRY/READ 16 & >: CPT | Performed by: PHYSICIAN ASSISTANT

## 2023-12-14 PROCEDURE — 42400 BIOPSY OF SALIVARY GLAND: CPT | Performed by: RADIOLOGY

## 2023-12-14 PROCEDURE — 88305 TISSUE EXAM BY PATHOLOGIST: CPT | Performed by: PATHOLOGY

## 2023-12-14 NOTE — DISCHARGE INSTRUCTIONS
Remove band-aid tomorrow  Steri-strips will fall off on own  Okay to shower tomorrow  Follow up with Pepe Kamara in 10 days for results  Okay to use ice as needed for pain  Okay to take tylenol as needed for pain  Resume normal diet  Notify MD with any s/s of infection or active bleeding

## 2023-12-21 DIAGNOSIS — I10 ESSENTIAL HYPERTENSION: ICD-10-CM

## 2023-12-21 LAB
CELL COUNT (BLOOD): 0.73 X10*3/UL
CELL POPULATIONS: NORMAL
DIAGNOSIS: NORMAL
FLOW DIFFERENTIAL: NORMAL
FLOW TEST ORDERED: NORMAL
LAB TEST METHOD: NORMAL
NUMBER OF CELLS COLLECTED: NORMAL
PATH REPORT.TOTAL CANCER: NORMAL
SIGNATURE COMMENT: NORMAL
SPECIMEN VIABILITY: NORMAL

## 2023-12-21 RX ORDER — LOSARTAN POTASSIUM 50 MG/1
50 TABLET ORAL DAILY
Qty: 90 TABLET | Refills: 0 | Status: SHIPPED | OUTPATIENT
Start: 2023-12-21 | End: 2024-02-15 | Stop reason: SDUPTHER

## 2023-12-22 ENCOUNTER — APPOINTMENT (OUTPATIENT)
Dept: PRIMARY CARE | Facility: CLINIC | Age: 83
End: 2023-12-22
Payer: MEDICARE

## 2023-12-22 LAB
LAB AP ASR DISCLAIMER: NORMAL
LABORATORY COMMENT REPORT: NORMAL
PATH REPORT.FINAL DX SPEC: NORMAL
PATH REPORT.GROSS SPEC: NORMAL
PATH REPORT.TOTAL CANCER: NORMAL

## 2023-12-27 DIAGNOSIS — C82.91: Primary | ICD-10-CM

## 2023-12-27 PROBLEM — N40.0 BENIGN PROSTATIC HYPERPLASIA WITHOUT URINARY OBSTRUCTION: Status: ACTIVE | Noted: 2023-12-27

## 2023-12-27 RX ORDER — HYDROCODONE BITARTRATE AND ACETAMINOPHEN 5; 325 MG/1; MG/1
1 TABLET ORAL EVERY 4 HOURS PRN
COMMUNITY
End: 2024-01-03 | Stop reason: ALTCHOICE

## 2023-12-27 RX ORDER — ASPIRIN 81 MG/1
81 TABLET ORAL DAILY
COMMUNITY
End: 2024-01-03 | Stop reason: ALTCHOICE

## 2024-01-02 ENCOUNTER — APPOINTMENT (OUTPATIENT)
Dept: HEMATOLOGY/ONCOLOGY | Facility: CLINIC | Age: 84
End: 2024-01-02
Payer: MEDICARE

## 2024-01-03 ENCOUNTER — OFFICE VISIT (OUTPATIENT)
Dept: HEMATOLOGY/ONCOLOGY | Facility: CLINIC | Age: 84
End: 2024-01-03
Payer: MEDICARE

## 2024-01-03 VITALS
WEIGHT: 251.77 LBS | RESPIRATION RATE: 17 BRPM | HEART RATE: 91 BPM | HEIGHT: 68 IN | BODY MASS INDEX: 38.16 KG/M2 | TEMPERATURE: 97.5 F | OXYGEN SATURATION: 98 % | DIASTOLIC BLOOD PRESSURE: 80 MMHG | SYSTOLIC BLOOD PRESSURE: 140 MMHG

## 2024-01-03 DIAGNOSIS — C82.18 GRADE 2 FOLLICULAR LYMPHOMA OF LYMPH NODES OF MULTIPLE REGIONS (MULTI): Primary | ICD-10-CM

## 2024-01-03 DIAGNOSIS — C82.91: ICD-10-CM

## 2024-01-03 PROCEDURE — 3077F SYST BP >= 140 MM HG: CPT | Performed by: INTERNAL MEDICINE

## 2024-01-03 PROCEDURE — 99215 OFFICE O/P EST HI 40 MIN: CPT | Performed by: INTERNAL MEDICINE

## 2024-01-03 PROCEDURE — 1160F RVW MEDS BY RX/DR IN RCRD: CPT | Performed by: INTERNAL MEDICINE

## 2024-01-03 PROCEDURE — 1159F MED LIST DOCD IN RCRD: CPT | Performed by: INTERNAL MEDICINE

## 2024-01-03 PROCEDURE — 1036F TOBACCO NON-USER: CPT | Performed by: INTERNAL MEDICINE

## 2024-01-03 PROCEDURE — 3079F DIAST BP 80-89 MM HG: CPT | Performed by: INTERNAL MEDICINE

## 2024-01-03 PROCEDURE — 1126F AMNT PAIN NOTED NONE PRSNT: CPT | Performed by: INTERNAL MEDICINE

## 2024-01-03 RX ORDER — ACETAMINOPHEN 325 MG/1
650 TABLET ORAL ONCE
Status: CANCELLED | OUTPATIENT
Start: 2024-02-14

## 2024-01-03 RX ORDER — DIPHENHYDRAMINE HYDROCHLORIDE 50 MG/ML
50 INJECTION INTRAMUSCULAR; INTRAVENOUS AS NEEDED
Status: CANCELLED | OUTPATIENT
Start: 2024-01-24

## 2024-01-03 RX ORDER — PROCHLORPERAZINE MALEATE 5 MG
10 TABLET ORAL EVERY 6 HOURS PRN
Status: CANCELLED | OUTPATIENT
Start: 2024-02-14

## 2024-01-03 RX ORDER — ACETAMINOPHEN 325 MG/1
650 TABLET ORAL ONCE
Status: CANCELLED | OUTPATIENT
Start: 2024-01-24

## 2024-01-03 RX ORDER — PROCHLORPERAZINE MALEATE 5 MG
10 TABLET ORAL EVERY 6 HOURS PRN
Status: CANCELLED | OUTPATIENT
Start: 2024-02-07

## 2024-01-03 RX ORDER — PROCHLORPERAZINE MALEATE 5 MG
10 TABLET ORAL EVERY 6 HOURS PRN
Status: CANCELLED | OUTPATIENT
Start: 2024-01-31

## 2024-01-03 RX ORDER — FAMOTIDINE 10 MG/ML
20 INJECTION INTRAVENOUS ONCE AS NEEDED
Status: CANCELLED | OUTPATIENT
Start: 2024-01-31

## 2024-01-03 RX ORDER — DIPHENHYDRAMINE HCL 50 MG
50 CAPSULE ORAL ONCE
Status: CANCELLED | OUTPATIENT
Start: 2024-02-14

## 2024-01-03 RX ORDER — FAMOTIDINE 10 MG/ML
20 INJECTION INTRAVENOUS ONCE AS NEEDED
Status: CANCELLED | OUTPATIENT
Start: 2024-01-24

## 2024-01-03 RX ORDER — EPINEPHRINE 0.3 MG/.3ML
0.3 INJECTION SUBCUTANEOUS EVERY 5 MIN PRN
Status: CANCELLED | OUTPATIENT
Start: 2024-02-07

## 2024-01-03 RX ORDER — ACETAMINOPHEN 325 MG/1
650 TABLET ORAL ONCE
Status: CANCELLED | OUTPATIENT
Start: 2024-01-31

## 2024-01-03 RX ORDER — EPINEPHRINE 0.3 MG/.3ML
0.3 INJECTION SUBCUTANEOUS EVERY 5 MIN PRN
Status: CANCELLED | OUTPATIENT
Start: 2024-01-24

## 2024-01-03 RX ORDER — DIPHENHYDRAMINE HCL 50 MG
50 CAPSULE ORAL ONCE
Status: CANCELLED | OUTPATIENT
Start: 2024-02-07

## 2024-01-03 RX ORDER — ACETAMINOPHEN 325 MG/1
650 TABLET ORAL ONCE
Status: CANCELLED | OUTPATIENT
Start: 2024-02-07

## 2024-01-03 RX ORDER — DIPHENHYDRAMINE HCL 50 MG
50 CAPSULE ORAL ONCE
Status: CANCELLED | OUTPATIENT
Start: 2024-01-24

## 2024-01-03 RX ORDER — DIPHENHYDRAMINE HYDROCHLORIDE 50 MG/ML
50 INJECTION INTRAMUSCULAR; INTRAVENOUS AS NEEDED
Status: CANCELLED | OUTPATIENT
Start: 2024-02-14

## 2024-01-03 RX ORDER — EPINEPHRINE 0.3 MG/.3ML
0.3 INJECTION SUBCUTANEOUS EVERY 5 MIN PRN
Status: CANCELLED | OUTPATIENT
Start: 2024-01-31

## 2024-01-03 RX ORDER — EPINEPHRINE 0.3 MG/.3ML
0.3 INJECTION SUBCUTANEOUS EVERY 5 MIN PRN
Status: CANCELLED | OUTPATIENT
Start: 2024-02-14

## 2024-01-03 RX ORDER — PROCHLORPERAZINE EDISYLATE 5 MG/ML
10 INJECTION INTRAMUSCULAR; INTRAVENOUS EVERY 6 HOURS PRN
Status: CANCELLED | OUTPATIENT
Start: 2024-02-07

## 2024-01-03 RX ORDER — DIPHENHYDRAMINE HCL 50 MG
50 CAPSULE ORAL ONCE
Status: CANCELLED | OUTPATIENT
Start: 2024-01-31

## 2024-01-03 RX ORDER — PROCHLORPERAZINE EDISYLATE 5 MG/ML
10 INJECTION INTRAMUSCULAR; INTRAVENOUS EVERY 6 HOURS PRN
Status: CANCELLED | OUTPATIENT
Start: 2024-01-31

## 2024-01-03 RX ORDER — PROCHLORPERAZINE EDISYLATE 5 MG/ML
10 INJECTION INTRAMUSCULAR; INTRAVENOUS EVERY 6 HOURS PRN
Status: CANCELLED | OUTPATIENT
Start: 2024-01-24

## 2024-01-03 RX ORDER — ALBUTEROL SULFATE 0.83 MG/ML
3 SOLUTION RESPIRATORY (INHALATION) AS NEEDED
Status: CANCELLED | OUTPATIENT
Start: 2024-02-14

## 2024-01-03 RX ORDER — ALBUTEROL SULFATE 0.83 MG/ML
3 SOLUTION RESPIRATORY (INHALATION) AS NEEDED
Status: CANCELLED | OUTPATIENT
Start: 2024-02-07

## 2024-01-03 RX ORDER — FAMOTIDINE 10 MG/ML
20 INJECTION INTRAVENOUS ONCE AS NEEDED
Status: CANCELLED | OUTPATIENT
Start: 2024-02-07

## 2024-01-03 RX ORDER — ALBUTEROL SULFATE 0.83 MG/ML
3 SOLUTION RESPIRATORY (INHALATION) AS NEEDED
Status: CANCELLED | OUTPATIENT
Start: 2024-01-31

## 2024-01-03 RX ORDER — FAMOTIDINE 10 MG/ML
20 INJECTION INTRAVENOUS ONCE AS NEEDED
Status: CANCELLED | OUTPATIENT
Start: 2024-02-14

## 2024-01-03 RX ORDER — PROCHLORPERAZINE EDISYLATE 5 MG/ML
10 INJECTION INTRAMUSCULAR; INTRAVENOUS EVERY 6 HOURS PRN
Status: CANCELLED | OUTPATIENT
Start: 2024-02-14

## 2024-01-03 RX ORDER — DIPHENHYDRAMINE HYDROCHLORIDE 50 MG/ML
50 INJECTION INTRAMUSCULAR; INTRAVENOUS AS NEEDED
Status: CANCELLED | OUTPATIENT
Start: 2024-02-07

## 2024-01-03 RX ORDER — ALBUTEROL SULFATE 0.83 MG/ML
3 SOLUTION RESPIRATORY (INHALATION) AS NEEDED
Status: CANCELLED | OUTPATIENT
Start: 2024-01-24

## 2024-01-03 RX ORDER — DIPHENHYDRAMINE HYDROCHLORIDE 50 MG/ML
50 INJECTION INTRAMUSCULAR; INTRAVENOUS AS NEEDED
Status: CANCELLED | OUTPATIENT
Start: 2024-01-31

## 2024-01-03 RX ORDER — PROCHLORPERAZINE MALEATE 5 MG
10 TABLET ORAL EVERY 6 HOURS PRN
Status: CANCELLED | OUTPATIENT
Start: 2024-01-24

## 2024-01-03 ASSESSMENT — ENCOUNTER SYMPTOMS
LOSS OF SENSATION IN FEET: 0
DEPRESSION: 0
OCCASIONAL FEELINGS OF UNSTEADINESS: 0

## 2024-01-03 ASSESSMENT — PAIN SCALES - GENERAL: PAINLEVEL: 0-NO PAIN

## 2024-01-03 ASSESSMENT — COLUMBIA-SUICIDE SEVERITY RATING SCALE - C-SSRS
2. HAVE YOU ACTUALLY HAD ANY THOUGHTS OF KILLING YOURSELF?: NO
1. IN THE PAST MONTH, HAVE YOU WISHED YOU WERE DEAD OR WISHED YOU COULD GO TO SLEEP AND NOT WAKE UP?: NO
6. HAVE YOU EVER DONE ANYTHING, STARTED TO DO ANYTHING, OR PREPARED TO DO ANYTHING TO END YOUR LIFE?: NO

## 2024-01-03 ASSESSMENT — PATIENT HEALTH QUESTIONNAIRE - PHQ9
1. LITTLE INTEREST OR PLEASURE IN DOING THINGS: NOT AT ALL
SUM OF ALL RESPONSES TO PHQ9 QUESTIONS 1 AND 2: 0
2. FEELING DOWN, DEPRESSED OR HOPELESS: NOT AT ALL

## 2024-01-03 NOTE — PATIENT INSTRUCTIONS
Today you met with Dr. Giordano.  You were given information from [facility] for the rituximab and the chemo class.  When you return your consent will be performed as you are decided about having the infusion.

## 2024-01-03 NOTE — PROGRESS NOTES
Patient ID: Angel Garibay is a 84 y.o. male.  Referring Physician: Cindy Kamara PA-C  [id number] [place] [address]  Primary Care Provider: DO Donte Murillo    HPI    83 year old gentleman with past medical history of HL, HTN, CAD, SVT, BPH, vit D deficiency, anxiety who is following for hx of low grade follicular lymphoma     Presented to evaluation in Nov/Dec 2018 with Dr. Nils Alejandre   noted small nodule in left neck; 2 lymph nodes were seen on ultrasound, one of which was excised showing and Low grade follicular non-hodgkin lymphoma.   11/13/2018 US neck  left submandibular space there is an enlarged hypoechoic lymph node with increased vascular flow and thickened cortex measuring approximately 2.3 x 1.7 x 2.4 cm in size. The cortex of the lymph node measures approximately 1.7 in thickness. Smaller lymph node more laterally measures approximately 1.6 x 1.2 x 1.3 cm in size  Left neck node excisional bx 11/27/18: low grade FL   12/24/18: CT neck: The left neck has multiple enlarged lymph nodes extending from level 1 B inferiorly to level 4.  12/24/18 CT C/A/P: Haziness of the mesenteric fat along the distribution of the SMA, with borderline lymph nodes, the appearance similar to slightly more apparent when compared to the prior examination of 08/14/2015.    Received weekly Rituximab x 4 from 2/7/19 to 2/28/19 8/5/19 CT C/A/P: no evidence of disease, no repeat CT neck but clinical response reported in notes     Followed with surveillance     11/20/23: CT neck: the parotid gland bilaterally measuring 2.2 x 2.0 cm on the right and 2.6 x 2.0 cm on the left. There are 2 nodules within the superficial lobe of the right parotid gland measuring 1.1 x 0.9 and 1.3 x 1.2 cm.  12/12/23: PET scan: 1. Right parotid gland with two superficial hypermetabolic soft tissue nodules and additional large hypermetabolic deep right parotid medial soft tissue lesion. Hypermetabolic soft tissue density  "located  medial to the left parotid gland. Additional multiple bilateral cervical hypermetabolic lymphadenopathy. Findings are compatible with relapsed lymphoma.  2. Multiple subdiaphragmatic abdominal and pelvic hyper metabolic lymphadenopathy and multiple hypermetabolic abdominal and pelvic peritoneal implants, consistent with blessing and extranodal disease involvement.  3. Left kidney with irregular contour, consider further correlation with renal ultrasound.    12/14/23: LYMPH NODE, PAROTID, CORE BIOPSY:  -- CLASSIC FOLLICULAR LYMPHOMA (WHO 2022).  SEE NOTE.   -- FOLLICULAR LYMPHOMA, GRADE 1-2 (ICC 2022).    He is feeling very well   Reports mild reaction with Rituxan, mild sore throat which resolved with benadryl   He is active  He works outside in the summertime    Lives with his wife   Eating and drinking well  Denies weight loss   Denies fever or night sweats   Denies abdominal pain, nausea or vomiting   Some PATEL    Past medical history     HL, HTN, CAD, SVT/PVCs, BPH, vit D deficiency, anxiety     Social history   Has one son   He worked in making airplanes   No smoking   Used to drink 1/5 a day, not recently     Family history   Father had prostate cancer     Review of Systems - Oncology   General: no change in appetite, no chills or fever  HEENT: no hearing loss, no mouth sores, no sore throat  EYES: no eyes problems   Respiratory: no cough, no shortness of breath   CV: no chest pain, no palpitations   GI: no abdominal pain, no diarrhea, no nausea or vomiting  : no difficulty urination, no dysuria or frequency   MSUC: no arthralgias, no back pan   Skin: no itching   Neurological: no dizziness, no headaches   Phychiatric: no anxiety or depression   Hematologic: no bruising, no adenopathy     Objective   BSA: 2.34 meters squared  /80 (BP Location: Left arm, Patient Position: Sitting, BP Cuff Size: Large adult)   Pulse 91   Temp 36.4 °C (97.5 °F) (Temporal)   Resp 17   Ht (S) 1.735 m (5' 8.31\")   " Wt 114 kg (251 lb 12.3 oz)   SpO2 98%   BMI 37.94 kg/m²     Wt Readings from Last 3 Encounters:   01/03/24 114 kg (251 lb 12.3 oz)   10/16/23 115 kg (253 lb)   08/07/23 109 kg (241 lb)   ]      No family history on file.  Oncology History    No history exists.       Angel Garibay  reports that he has never smoked. He has never used smokeless tobacco.  He  has no history on file for alcohol use.  He  has no history on file for drug use.    Physical Exam  Constitutional: alert, awake and oriented, not in acute distress   HEENT: moist mucous membranes, normal nose   Neck: supple, right parotid small nodules   EYES: PERRL, EOM intact, conjunctiva normal  CV: normal rate, regular rhythm, no murmur   Pulmonary: normal effort, no wheezing, equal air entry   Abdominal: soft, non tender, non distended, no palpable hepatosplenomegaly   : no CVA tenderness  Skin: no jaundice, rash or erythema  Neurological: AAOx3, no gross focal deficit   Psychiatric: normal mood and behavior   Lymph: no supraclavicular, axillary or inguinal LAD    Performance Status:  Symptomatic; fully ambulatory    Assessment/Plan      Recurrent Follicular Lymphoma     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 s/p Rituxan x 4 weekly doses in 2018   Now with evidence of widespread recurrence although not much symptomatic   Due to multiple areas of disease particularly in the abdomen/peritoneum at risk of complications would recommend treatment rather than surveillance at this time     We discussed re-treatment with Rituxan or Obinutuzumab vs anti-CD20 + Revlimid or Bendamustine, we discussed the pros and cons of each, he leans towards doing less and even considering not doing treatment     Recommended in this case that we do at least a repeat Rituximab course, he will think about it and let us know     RTC 4 weeks     Velasquez Giordano MD

## 2024-01-08 ENCOUNTER — LAB REQUISITION (OUTPATIENT)
Dept: LAB | Facility: HOSPITAL | Age: 84
End: 2024-01-08
Payer: MEDICARE

## 2024-01-08 LAB
CREAT SERPL-MCNC: 0.5 MG/DL (ref 0.6–1.3)
GFR SERPL CREATININE-BSD FRML MDRD: >60 ML/MIN/1.73M*2

## 2024-01-15 ENCOUNTER — APPOINTMENT (OUTPATIENT)
Dept: HEMATOLOGY/ONCOLOGY | Facility: CLINIC | Age: 84
End: 2024-01-15
Payer: MEDICARE

## 2024-01-18 RX ORDER — METOPROLOL SUCCINATE 50 MG/1
TABLET, EXTENDED RELEASE ORAL
COMMUNITY
Start: 2023-10-16 | End: 2024-01-19 | Stop reason: ALTCHOICE

## 2024-01-18 RX ORDER — GABAPENTIN 100 MG/1
CAPSULE ORAL
COMMUNITY
Start: 2023-06-26 | End: 2024-01-19 | Stop reason: WASHOUT

## 2024-01-19 ENCOUNTER — TELEMEDICINE (OUTPATIENT)
Dept: PRIMARY CARE | Facility: CLINIC | Age: 84
End: 2024-01-19
Payer: MEDICARE

## 2024-01-19 DIAGNOSIS — F41.9 ANXIETY: Primary | ICD-10-CM

## 2024-01-19 PROCEDURE — 99443 PR PHYS/QHP TELEPHONE EVALUATION 21-30 MIN: CPT | Performed by: NURSE PRACTITIONER

## 2024-01-19 RX ORDER — CLORAZEPATE DIPOTASSIUM 15 MG/1
15 TABLET ORAL 2 TIMES DAILY
Qty: 180 TABLET | Refills: 0 | Status: SHIPPED | OUTPATIENT
Start: 2024-01-19 | End: 2024-01-24 | Stop reason: SDUPTHER

## 2024-01-19 NOTE — PROGRESS NOTES
Subjective   Patient ID: Angel Garibay is a 84 y.o. male who presents for medication refills.    HPI:    Feeling well.  No complaints.  Denies chest pain, SOB, palpitations, dizziness,  or GI issues.  Has upcoming appointment with Dr. Jurado.      I HAVE REVIEWED THE OARRS, WHICH WAS APPROPRIATE. I HAVE EVALUATED THE RISKS OF ABUSE, ADDICTION, DIVERSION, AND DEPENDENCE. PRESCRIBED MEDICATION SEEMS APPROPRIATE FOR DOCUMENTED DIAGNOSIS.    USD and CSA up to date    Follow up at scheduled visit.

## 2024-01-22 ENCOUNTER — APPOINTMENT (OUTPATIENT)
Dept: PRIMARY CARE | Facility: CLINIC | Age: 84
End: 2024-01-22
Payer: MEDICARE

## 2024-01-24 ENCOUNTER — INFUSION (OUTPATIENT)
Dept: HEMATOLOGY/ONCOLOGY | Facility: CLINIC | Age: 84
End: 2024-01-24
Payer: MEDICARE

## 2024-01-24 ENCOUNTER — OFFICE VISIT (OUTPATIENT)
Dept: HEMATOLOGY/ONCOLOGY | Facility: CLINIC | Age: 84
End: 2024-01-24
Payer: MEDICARE

## 2024-01-24 VITALS
HEART RATE: 90 BPM | DIASTOLIC BLOOD PRESSURE: 81 MMHG | SYSTOLIC BLOOD PRESSURE: 143 MMHG | OXYGEN SATURATION: 97 % | RESPIRATION RATE: 16 BRPM | TEMPERATURE: 97 F

## 2024-01-24 VITALS
TEMPERATURE: 97.7 F | RESPIRATION RATE: 18 BRPM | DIASTOLIC BLOOD PRESSURE: 79 MMHG | BODY MASS INDEX: 37.16 KG/M2 | WEIGHT: 246.58 LBS | HEART RATE: 102 BPM | OXYGEN SATURATION: 94 % | SYSTOLIC BLOOD PRESSURE: 142 MMHG

## 2024-01-24 DIAGNOSIS — F41.9 ANXIETY: ICD-10-CM

## 2024-01-24 DIAGNOSIS — C82.18 GRADE 2 FOLLICULAR LYMPHOMA OF LYMPH NODES OF MULTIPLE REGIONS (MULTI): Primary | ICD-10-CM

## 2024-01-24 DIAGNOSIS — C82.18 GRADE 2 FOLLICULAR LYMPHOMA OF LYMPH NODES OF MULTIPLE REGIONS (MULTI): ICD-10-CM

## 2024-01-24 LAB
ALBUMIN SERPL BCP-MCNC: 3.9 G/DL (ref 3.4–5)
ALP SERPL-CCNC: 98 U/L (ref 33–136)
ALT SERPL W P-5'-P-CCNC: 15 U/L (ref 10–52)
ANION GAP SERPL CALC-SCNC: 10 MMOL/L (ref 10–20)
AST SERPL W P-5'-P-CCNC: 19 U/L (ref 9–39)
BASOPHILS # BLD AUTO: 0.02 X10*3/UL (ref 0–0.1)
BASOPHILS NFR BLD AUTO: 0.3 %
BILIRUB SERPL-MCNC: 0.6 MG/DL (ref 0–1.2)
BUN SERPL-MCNC: 16 MG/DL (ref 6–23)
CALCIUM SERPL-MCNC: 9.4 MG/DL (ref 8.6–10.3)
CHLORIDE SERPL-SCNC: 105 MMOL/L (ref 98–107)
CO2 SERPL-SCNC: 28 MMOL/L (ref 21–32)
CREAT SERPL-MCNC: 0.96 MG/DL (ref 0.5–1.3)
EGFRCR SERPLBLD CKD-EPI 2021: 77 ML/MIN/1.73M*2
EOSINOPHIL # BLD AUTO: 0.17 X10*3/UL (ref 0–0.4)
EOSINOPHIL NFR BLD AUTO: 2.8 %
ERYTHROCYTE [DISTWIDTH] IN BLOOD BY AUTOMATED COUNT: 12.6 % (ref 11.5–14.5)
GLUCOSE SERPL-MCNC: 127 MG/DL (ref 74–99)
HBV CORE AB SER QL: NONREACTIVE
HBV SURFACE AB SER-ACNC: <3.1 MIU/ML
HBV SURFACE AG SERPL QL IA: NONREACTIVE
HCT VFR BLD AUTO: 41.1 % (ref 41–52)
HGB BLD-MCNC: 13.2 G/DL (ref 13.5–17.5)
IMM GRANULOCYTES # BLD AUTO: 0.01 X10*3/UL (ref 0–0.5)
IMM GRANULOCYTES NFR BLD AUTO: 0.2 % (ref 0–0.9)
LDH SERPL L TO P-CCNC: 176 U/L (ref 84–246)
LYMPHOCYTES # BLD AUTO: 1.09 X10*3/UL (ref 0.8–3)
LYMPHOCYTES NFR BLD AUTO: 18.3 %
MCH RBC QN AUTO: 30.9 PG (ref 26–34)
MCHC RBC AUTO-ENTMCNC: 32.1 G/DL (ref 32–36)
MCV RBC AUTO: 96 FL (ref 80–100)
MONOCYTES # BLD AUTO: 0.6 X10*3/UL (ref 0.05–0.8)
MONOCYTES NFR BLD AUTO: 10.1 %
NEUTROPHILS # BLD AUTO: 4.08 X10*3/UL (ref 1.6–5.5)
NEUTROPHILS NFR BLD AUTO: 68.3 %
PLATELET # BLD AUTO: 259 X10*3/UL (ref 150–450)
POTASSIUM SERPL-SCNC: 4.2 MMOL/L (ref 3.5–5.3)
PROT SERPL-MCNC: 7 G/DL (ref 6.4–8.2)
RBC # BLD AUTO: 4.27 X10*6/UL (ref 4.5–5.9)
SODIUM SERPL-SCNC: 139 MMOL/L (ref 136–145)
URATE SERPL-MCNC: 5.7 MG/DL (ref 4–7.5)
WBC # BLD AUTO: 6 X10*3/UL (ref 4.4–11.3)

## 2024-01-24 PROCEDURE — 83615 LACTATE (LD) (LDH) ENZYME: CPT | Performed by: INTERNAL MEDICINE

## 2024-01-24 PROCEDURE — 2500000004 HC RX 250 GENERAL PHARMACY W/ HCPCS (ALT 636 FOR OP/ED): Performed by: INTERNAL MEDICINE

## 2024-01-24 PROCEDURE — 87340 HEPATITIS B SURFACE AG IA: CPT | Mod: GEALAB | Performed by: INTERNAL MEDICINE

## 2024-01-24 PROCEDURE — 1160F RVW MEDS BY RX/DR IN RCRD: CPT | Performed by: INTERNAL MEDICINE

## 2024-01-24 PROCEDURE — 2500000001 HC RX 250 WO HCPCS SELF ADMINISTERED DRUGS (ALT 637 FOR MEDICARE OP): Performed by: INTERNAL MEDICINE

## 2024-01-24 PROCEDURE — 86704 HEP B CORE ANTIBODY TOTAL: CPT | Mod: GEALAB | Performed by: INTERNAL MEDICINE

## 2024-01-24 PROCEDURE — 96413 CHEMO IV INFUSION 1 HR: CPT

## 2024-01-24 PROCEDURE — 1036F TOBACCO NON-USER: CPT | Performed by: INTERNAL MEDICINE

## 2024-01-24 PROCEDURE — 96415 CHEMO IV INFUSION ADDL HR: CPT

## 2024-01-24 PROCEDURE — 1159F MED LIST DOCD IN RCRD: CPT | Performed by: INTERNAL MEDICINE

## 2024-01-24 PROCEDURE — 86706 HEP B SURFACE ANTIBODY: CPT | Mod: GEALAB | Performed by: INTERNAL MEDICINE

## 2024-01-24 PROCEDURE — 99215 OFFICE O/P EST HI 40 MIN: CPT | Performed by: INTERNAL MEDICINE

## 2024-01-24 PROCEDURE — 85025 COMPLETE CBC W/AUTO DIFF WBC: CPT | Performed by: INTERNAL MEDICINE

## 2024-01-24 PROCEDURE — 96375 TX/PRO/DX INJ NEW DRUG ADDON: CPT | Mod: INF

## 2024-01-24 PROCEDURE — 3078F DIAST BP <80 MM HG: CPT | Performed by: INTERNAL MEDICINE

## 2024-01-24 PROCEDURE — 1126F AMNT PAIN NOTED NONE PRSNT: CPT | Performed by: INTERNAL MEDICINE

## 2024-01-24 PROCEDURE — 3077F SYST BP >= 140 MM HG: CPT | Performed by: INTERNAL MEDICINE

## 2024-01-24 PROCEDURE — 80053 COMPREHEN METABOLIC PANEL: CPT | Performed by: INTERNAL MEDICINE

## 2024-01-24 PROCEDURE — 84550 ASSAY OF BLOOD/URIC ACID: CPT | Performed by: INTERNAL MEDICINE

## 2024-01-24 RX ORDER — HEPARIN 100 UNIT/ML
500 SYRINGE INTRAVENOUS AS NEEDED
Status: CANCELLED | OUTPATIENT
Start: 2024-01-24

## 2024-01-24 RX ORDER — CLORAZEPATE DIPOTASSIUM 15 MG/1
15 TABLET ORAL 2 TIMES DAILY
Qty: 180 TABLET | Refills: 0 | Status: SHIPPED | OUTPATIENT
Start: 2024-01-24

## 2024-01-24 RX ORDER — HEPARIN SODIUM,PORCINE/PF 10 UNIT/ML
50 SYRINGE (ML) INTRAVENOUS AS NEEDED
Status: CANCELLED | OUTPATIENT
Start: 2024-01-24

## 2024-01-24 RX ORDER — ACETAMINOPHEN 325 MG/1
650 TABLET ORAL ONCE
Status: COMPLETED | OUTPATIENT
Start: 2024-01-24 | End: 2024-01-24

## 2024-01-24 RX ORDER — EPINEPHRINE 0.3 MG/.3ML
0.3 INJECTION SUBCUTANEOUS EVERY 5 MIN PRN
Status: DISCONTINUED | OUTPATIENT
Start: 2024-01-24 | End: 2024-01-24 | Stop reason: HOSPADM

## 2024-01-24 RX ORDER — DIPHENHYDRAMINE HCL 25 MG
50 CAPSULE ORAL ONCE
Status: COMPLETED | OUTPATIENT
Start: 2024-01-24 | End: 2024-01-24

## 2024-01-24 RX ORDER — PROCHLORPERAZINE MALEATE 10 MG
10 TABLET ORAL EVERY 6 HOURS PRN
Status: DISCONTINUED | OUTPATIENT
Start: 2024-01-24 | End: 2024-01-24 | Stop reason: HOSPADM

## 2024-01-24 RX ORDER — FAMOTIDINE 10 MG/ML
20 INJECTION INTRAVENOUS ONCE AS NEEDED
Status: DISCONTINUED | OUTPATIENT
Start: 2024-01-24 | End: 2024-01-24 | Stop reason: HOSPADM

## 2024-01-24 RX ORDER — PROCHLORPERAZINE EDISYLATE 5 MG/ML
10 INJECTION INTRAMUSCULAR; INTRAVENOUS EVERY 6 HOURS PRN
Status: DISCONTINUED | OUTPATIENT
Start: 2024-01-24 | End: 2024-01-24 | Stop reason: HOSPADM

## 2024-01-24 RX ORDER — FAMOTIDINE 10 MG/ML
20 INJECTION INTRAVENOUS ONCE
Status: CANCELLED
Start: 2024-01-24 | End: 2024-01-24

## 2024-01-24 RX ORDER — FAMOTIDINE 10 MG/ML
20 INJECTION INTRAVENOUS ONCE
Status: COMPLETED | OUTPATIENT
Start: 2024-01-24 | End: 2024-01-24

## 2024-01-24 RX ORDER — DIPHENHYDRAMINE HYDROCHLORIDE 50 MG/ML
50 INJECTION INTRAMUSCULAR; INTRAVENOUS AS NEEDED
Status: DISCONTINUED | OUTPATIENT
Start: 2024-01-24 | End: 2024-01-24 | Stop reason: HOSPADM

## 2024-01-24 RX ORDER — ALBUTEROL SULFATE 0.83 MG/ML
3 SOLUTION RESPIRATORY (INHALATION) AS NEEDED
Status: DISCONTINUED | OUTPATIENT
Start: 2024-01-24 | End: 2024-01-24 | Stop reason: HOSPADM

## 2024-01-24 RX ADMIN — DIPHENHYDRAMINE HYDROCHLORIDE 50 MG: 25 CAPSULE ORAL at 11:08

## 2024-01-24 RX ADMIN — FAMOTIDINE 20 MG: 10 INJECTION, SOLUTION INTRAVENOUS at 11:08

## 2024-01-24 RX ADMIN — ACETAMINOPHEN 650 MG: 325 TABLET ORAL at 11:08

## 2024-01-24 RX ADMIN — SODIUM CHLORIDE 900 MG: 9 INJECTION, SOLUTION INTRAVENOUS at 11:47

## 2024-01-24 RX ADMIN — HYDROCORTISONE SODIUM SUCCINATE 100 MG: 100 INJECTION, POWDER, FOR SOLUTION INTRAMUSCULAR; INTRAVENOUS at 11:08

## 2024-01-24 ASSESSMENT — ENCOUNTER SYMPTOMS
LOSS OF SENSATION IN FEET: 0
OCCASIONAL FEELINGS OF UNSTEADINESS: 0
DEPRESSION: 0

## 2024-01-24 ASSESSMENT — PAIN SCALES - GENERAL: PAINLEVEL: 0-NO PAIN

## 2024-01-24 NOTE — PROGRESS NOTES
Patient ID: Angel Garibay is a 84 y.o. male.  Referring Physician: Velasquez Giordano MD  [id number] [place] [address]  Primary Care Provider: Wilbur Jurado DO      Subjective    HPI    83 year old gentleman with past medical history of HL, HTN, CAD, SVT, BPH, vit D deficiency, anxiety who is following for hx of low grade follicular lymphoma     Presented to evaluation in Nov/Dec 2018 with Dr. Nils Alejandre   noted small nodule in left neck; 2 lymph nodes were seen on ultrasound, one of which was excised showing and Low grade follicular non-hodgkin lymphoma.   11/13/2018 US neck  left submandibular space there is an enlarged hypoechoic lymph node with increased vascular flow and thickened cortex measuring approximately 2.3 x 1.7 x 2.4 cm in size. The cortex of the lymph node measures approximately 1.7 in thickness. Smaller lymph node more laterally measures approximately 1.6 x 1.2 x 1.3 cm in size  Left neck node excisional bx 11/27/18: low grade FL   12/24/18: CT neck: The left neck has multiple enlarged lymph nodes extending from level 1 B inferiorly to level 4.  12/24/18 CT C/A/P: Haziness of the mesenteric fat along the distribution of the SMA, with borderline lymph nodes, the appearance similar to slightly more apparent when compared to the prior examination of 08/14/2015.    Received weekly Rituximab x 4 from 2/7/19 to 2/28/19 8/5/19 CT C/A/P: no evidence of disease, no repeat CT neck but clinical response reported in notes     Followed with surveillance     11/20/23: CT neck: the parotid gland bilaterally measuring 2.2 x 2.0 cm on the right and 2.6 x 2.0 cm on the left. There are 2 nodules within the superficial lobe of the right parotid gland measuring 1.1 x 0.9 and 1.3 x 1.2 cm.  12/12/23: PET scan: 1. Right parotid gland with two superficial hypermetabolic soft tissue nodules and additional large hypermetabolic deep right parotid medial soft tissue lesion. Hypermetabolic  soft tissue density located  medial to the left parotid gland. Additional multiple bilateral cervical hypermetabolic lymphadenopathy. Findings are compatible with relapsed lymphoma.  2. Multiple subdiaphragmatic abdominal and pelvic hyper metabolic lymphadenopathy and multiple hypermetabolic abdominal and pelvic peritoneal implants, consistent with blessing and extranodal disease involvement.  3. Left kidney with irregular contour, consider further correlation with renal ultrasound.    12/14/23: LYMPH NODE, PAROTID, CORE BIOPSY:  -- CLASSIC FOLLICULAR LYMPHOMA (WHO 2022).  SEE NOTE.   -- FOLLICULAR LYMPHOMA, GRADE 1-2 (ICC 2022).    He is feeling very well   Reports mild reaction with Rituxan, mild sore throat which resolved with benadryl   He is active  He works outside in the summertime    Lives with his wife   Eating and drinking well  Denies weight loss   Denies fever or night sweats   Denies abdominal pain, nausea or vomiting   Some PATEL    Interval history - 1/24/24    He feels well   He denies any nausea or vomiting   No abd pain   No increase in symptoms   Denies any fevers or chills  Eating well, no weight loss  Remains active  No bleeding events  No additional new complaints    Past medical history     HL, HTN, CAD, SVT/PVCs, BPH, vit D deficiency, anxiety     Social history   Has one son   He worked in making airplanes   No smoking   Used to drink 1/5 a day, not recently     Family history   Father had prostate cancer     Review of Systems - Oncology   General: no change in appetite, no chills or fever  HEENT: no hearing loss, no mouth sores, no sore throat  EYES: no eyes problems   Respiratory: no cough, no shortness of breath   CV: no chest pain, no palpitations   GI: no abdominal pain, no diarrhea, no nausea or vomiting  : no difficulty urination, no dysuria or frequency   MSUC: no arthralgias, no back pan   Skin: no itching   Neurological: no dizziness, no headaches   Phychiatric: no anxiety or  depression   Hematologic: no bruising, no adenopathy     Objective   BSA: 2.32 meters squared  /79 (BP Location: Left arm, Patient Position: Sitting, BP Cuff Size: Large adult)   Pulse 102   Temp 36.5 °C (97.7 °F) (Temporal)   Resp 18   Wt 112 kg (246 lb 9.4 oz)   SpO2 94%   BMI 37.16 kg/m²     Wt Readings from Last 3 Encounters:   01/24/24 112 kg (246 lb 9.4 oz)   01/03/24 114 kg (251 lb 12.3 oz)   10/16/23 115 kg (253 lb)   ]      No family history on file.  Oncology History   Non-Hodgkin's lymphoma (CMS/HCC)   7/29/2019 Initial Diagnosis    Non-Hodgkin's lymphoma (CMS/HCC)     1/24/2024 -  Chemotherapy    RiTUXimab (Weekly), 28 Day Cycle          Angel Garibay  reports that he has never smoked. He has never used smokeless tobacco.  He  has no history on file for alcohol use.  He  has no history on file for drug use.    Physical Exam  Constitutional: alert, awake and oriented, not in acute distress   HEENT: moist mucous membranes, normal nose   Neck: supple, right parotid small nodules   EYES: PERRL, EOM intact, conjunctiva normal  CV: normal rate, regular rhythm, no murmur   Pulmonary: normal effort, no wheezing, equal air entry   Abdominal: soft, non tender, non distended, no palpable hepatosplenomegaly   : no CVA tenderness  Skin: no jaundice, rash or erythema  Neurological: AAOx3, no gross focal deficit   Psychiatric: normal mood and behavior   Lymph: no supraclavicular, axillary or inguinal LAD    Performance Status:  Symptomatic; fully ambulatory    Assessment/Plan      Recurrent Follicular Lymphoma     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 s/p Rituxan x 4 weekly doses in 2018   Now with evidence of widespread recurrence although not much symptomatic   Due to multiple areas of disease particularly in the abdomen/peritoneum at risk of complications would recommend treatment rather than surveillance at this time     We discussed re-treatment with Rituxan  or Obinutuzumab vs anti-CD20 + Revlimid or Bendamustine, we discussed the pros and cons of each, he leans towards doing less and even considering not doing treatment     Recommended in this case that we do at least a repeat Rituximab course, he will think about it and let us know     1/24/2024-we discussed his options again and he expressed desire to proceed with treatment, we discussed his therapy options again including rituximab plus or minus additional agents  After our discussion the plan was to proceed with rituximab and reserve additional therapy in case of no response  Will plan for 4 weekly doses of Rituxan followed by restaging PET scan 1 month after completion at which point we will also discuss maintenance treatment    RTC 4 weeks     Velasquez Giordano MD

## 2024-01-24 NOTE — PATIENT INSTRUCTIONS
Today you visited with your Oncologist.  Your recent concerns were discussed and questions were answered.  Your current treatment regimen was discussed and the plan going forward was also discussed.  Scheduling orders were placed to reflect this conversation.  Please call our office for any questions that may arise after leaving today.   [phone]    Review your schedule upon leaving every infusion visit.  Remember you will no longer see lab visits noted separately on your schedule.

## 2024-01-31 ENCOUNTER — INFUSION (OUTPATIENT)
Dept: HEMATOLOGY/ONCOLOGY | Facility: CLINIC | Age: 84
End: 2024-01-31
Payer: MEDICARE

## 2024-01-31 VITALS
SYSTOLIC BLOOD PRESSURE: 142 MMHG | WEIGHT: 246.69 LBS | TEMPERATURE: 97.3 F | DIASTOLIC BLOOD PRESSURE: 80 MMHG | HEART RATE: 90 BPM | BODY MASS INDEX: 37.17 KG/M2 | RESPIRATION RATE: 17 BRPM | OXYGEN SATURATION: 98 %

## 2024-01-31 DIAGNOSIS — C82.18 GRADE 2 FOLLICULAR LYMPHOMA OF LYMPH NODES OF MULTIPLE REGIONS (MULTI): ICD-10-CM

## 2024-01-31 LAB
ALBUMIN SERPL BCP-MCNC: 3.8 G/DL (ref 3.4–5)
ALP SERPL-CCNC: 86 U/L (ref 33–136)
ALT SERPL W P-5'-P-CCNC: 18 U/L (ref 10–52)
ANION GAP SERPL CALC-SCNC: 10 MMOL/L (ref 10–20)
AST SERPL W P-5'-P-CCNC: 32 U/L (ref 9–39)
BASOPHILS # BLD AUTO: 0.04 X10*3/UL (ref 0–0.1)
BASOPHILS NFR BLD AUTO: 0.7 %
BILIRUB SERPL-MCNC: 0.6 MG/DL (ref 0–1.2)
BUN SERPL-MCNC: 15 MG/DL (ref 6–23)
CALCIUM SERPL-MCNC: 9 MG/DL (ref 8.6–10.3)
CHLORIDE SERPL-SCNC: 105 MMOL/L (ref 98–107)
CO2 SERPL-SCNC: 27 MMOL/L (ref 21–32)
CREAT SERPL-MCNC: 0.87 MG/DL (ref 0.5–1.3)
EGFRCR SERPLBLD CKD-EPI 2021: 85 ML/MIN/1.73M*2
EOSINOPHIL # BLD AUTO: 0.18 X10*3/UL (ref 0–0.4)
EOSINOPHIL NFR BLD AUTO: 3 %
ERYTHROCYTE [DISTWIDTH] IN BLOOD BY AUTOMATED COUNT: 12.7 % (ref 11.5–14.5)
GLUCOSE SERPL-MCNC: 174 MG/DL (ref 74–99)
HCT VFR BLD AUTO: 40.6 % (ref 41–52)
HGB BLD-MCNC: 13 G/DL (ref 13.5–17.5)
IMM GRANULOCYTES # BLD AUTO: 0.01 X10*3/UL (ref 0–0.5)
IMM GRANULOCYTES NFR BLD AUTO: 0.2 % (ref 0–0.9)
LDH SERPL L TO P-CCNC: 405 U/L (ref 84–246)
LYMPHOCYTES # BLD AUTO: 1.03 X10*3/UL (ref 0.8–3)
LYMPHOCYTES NFR BLD AUTO: 17.4 %
MCH RBC QN AUTO: 30.7 PG (ref 26–34)
MCHC RBC AUTO-ENTMCNC: 32 G/DL (ref 32–36)
MCV RBC AUTO: 96 FL (ref 80–100)
MONOCYTES # BLD AUTO: 0.43 X10*3/UL (ref 0.05–0.8)
MONOCYTES NFR BLD AUTO: 7.3 %
NEUTROPHILS # BLD AUTO: 4.22 X10*3/UL (ref 1.6–5.5)
NEUTROPHILS NFR BLD AUTO: 71.4 %
PLATELET # BLD AUTO: 247 X10*3/UL (ref 150–450)
POTASSIUM SERPL-SCNC: 5.4 MMOL/L (ref 3.5–5.3)
PROT SERPL-MCNC: 7.2 G/DL (ref 6.4–8.2)
RBC # BLD AUTO: 4.23 X10*6/UL (ref 4.5–5.9)
SODIUM SERPL-SCNC: 137 MMOL/L (ref 136–145)
URATE SERPL-MCNC: 5.3 MG/DL (ref 4–7.5)
WBC # BLD AUTO: 5.9 X10*3/UL (ref 4.4–11.3)

## 2024-01-31 PROCEDURE — 80053 COMPREHEN METABOLIC PANEL: CPT | Performed by: INTERNAL MEDICINE

## 2024-01-31 PROCEDURE — 83615 LACTATE (LD) (LDH) ENZYME: CPT | Performed by: INTERNAL MEDICINE

## 2024-01-31 PROCEDURE — 2500000004 HC RX 250 GENERAL PHARMACY W/ HCPCS (ALT 636 FOR OP/ED): Performed by: INTERNAL MEDICINE

## 2024-01-31 PROCEDURE — 96413 CHEMO IV INFUSION 1 HR: CPT

## 2024-01-31 PROCEDURE — 84550 ASSAY OF BLOOD/URIC ACID: CPT | Performed by: INTERNAL MEDICINE

## 2024-01-31 PROCEDURE — 2500000004 HC RX 250 GENERAL PHARMACY W/ HCPCS (ALT 636 FOR OP/ED): Mod: JZ | Performed by: INTERNAL MEDICINE

## 2024-01-31 PROCEDURE — 2500000001 HC RX 250 WO HCPCS SELF ADMINISTERED DRUGS (ALT 637 FOR MEDICARE OP): Performed by: INTERNAL MEDICINE

## 2024-01-31 PROCEDURE — 96375 TX/PRO/DX INJ NEW DRUG ADDON: CPT | Mod: INF

## 2024-01-31 PROCEDURE — 96415 CHEMO IV INFUSION ADDL HR: CPT

## 2024-01-31 PROCEDURE — 85025 COMPLETE CBC W/AUTO DIFF WBC: CPT | Performed by: INTERNAL MEDICINE

## 2024-01-31 RX ORDER — EPINEPHRINE 0.3 MG/.3ML
0.3 INJECTION SUBCUTANEOUS EVERY 5 MIN PRN
Status: DISCONTINUED | OUTPATIENT
Start: 2024-01-31 | End: 2024-01-31 | Stop reason: HOSPADM

## 2024-01-31 RX ORDER — DIPHENHYDRAMINE HYDROCHLORIDE 50 MG/ML
50 INJECTION INTRAMUSCULAR; INTRAVENOUS AS NEEDED
Status: DISCONTINUED | OUTPATIENT
Start: 2024-01-31 | End: 2024-01-31 | Stop reason: HOSPADM

## 2024-01-31 RX ORDER — PROCHLORPERAZINE MALEATE 10 MG
10 TABLET ORAL EVERY 6 HOURS PRN
Status: DISCONTINUED | OUTPATIENT
Start: 2024-01-31 | End: 2024-01-31 | Stop reason: HOSPADM

## 2024-01-31 RX ORDER — FAMOTIDINE 10 MG/ML
20 INJECTION INTRAVENOUS ONCE AS NEEDED
Status: DISCONTINUED | OUTPATIENT
Start: 2024-01-31 | End: 2024-01-31 | Stop reason: HOSPADM

## 2024-01-31 RX ORDER — FAMOTIDINE 10 MG/ML
20 INJECTION INTRAVENOUS ONCE
Status: COMPLETED | OUTPATIENT
Start: 2024-01-31 | End: 2024-01-31

## 2024-01-31 RX ORDER — ALBUTEROL SULFATE 0.83 MG/ML
3 SOLUTION RESPIRATORY (INHALATION) AS NEEDED
Status: DISCONTINUED | OUTPATIENT
Start: 2024-01-31 | End: 2024-01-31 | Stop reason: HOSPADM

## 2024-01-31 RX ORDER — ACETAMINOPHEN 325 MG/1
650 TABLET ORAL ONCE
Status: COMPLETED | OUTPATIENT
Start: 2024-01-31 | End: 2024-01-31

## 2024-01-31 RX ORDER — PROCHLORPERAZINE EDISYLATE 5 MG/ML
10 INJECTION INTRAMUSCULAR; INTRAVENOUS EVERY 6 HOURS PRN
Status: DISCONTINUED | OUTPATIENT
Start: 2024-01-31 | End: 2024-01-31 | Stop reason: HOSPADM

## 2024-01-31 RX ORDER — DIPHENHYDRAMINE HCL 25 MG
50 CAPSULE ORAL ONCE
Status: COMPLETED | OUTPATIENT
Start: 2024-01-31 | End: 2024-01-31

## 2024-01-31 RX ADMIN — ACETAMINOPHEN 650 MG: 325 TABLET ORAL at 10:23

## 2024-01-31 RX ADMIN — FAMOTIDINE 20 MG: 10 INJECTION, SOLUTION INTRAVENOUS at 10:24

## 2024-01-31 RX ADMIN — DIPHENHYDRAMINE HYDROCHLORIDE 50 MG: 25 CAPSULE ORAL at 10:24

## 2024-01-31 RX ADMIN — SODIUM CHLORIDE 900 MG: 9 INJECTION, SOLUTION INTRAVENOUS at 11:02

## 2024-01-31 RX ADMIN — HYDROCORTISONE SODIUM SUCCINATE 100 MG: 100 INJECTION, POWDER, FOR SOLUTION INTRAMUSCULAR; INTRAVENOUS at 10:24

## 2024-01-31 ASSESSMENT — PAIN SCALES - GENERAL: PAINLEVEL: 0-NO PAIN

## 2024-02-07 ENCOUNTER — INFUSION (OUTPATIENT)
Dept: HEMATOLOGY/ONCOLOGY | Facility: CLINIC | Age: 84
End: 2024-02-07
Payer: MEDICARE

## 2024-02-07 ENCOUNTER — OFFICE VISIT (OUTPATIENT)
Dept: HEMATOLOGY/ONCOLOGY | Facility: CLINIC | Age: 84
End: 2024-02-07
Payer: MEDICARE

## 2024-02-07 VITALS
BODY MASS INDEX: 37.21 KG/M2 | WEIGHT: 246.91 LBS | RESPIRATION RATE: 16 BRPM | SYSTOLIC BLOOD PRESSURE: 136 MMHG | OXYGEN SATURATION: 93 % | DIASTOLIC BLOOD PRESSURE: 80 MMHG | HEART RATE: 92 BPM

## 2024-02-07 VITALS
OXYGEN SATURATION: 98 % | SYSTOLIC BLOOD PRESSURE: 118 MMHG | RESPIRATION RATE: 16 BRPM | DIASTOLIC BLOOD PRESSURE: 70 MMHG | HEART RATE: 82 BPM

## 2024-02-07 DIAGNOSIS — C82.18 GRADE 2 FOLLICULAR LYMPHOMA OF LYMPH NODES OF MULTIPLE REGIONS (MULTI): ICD-10-CM

## 2024-02-07 LAB
ALBUMIN SERPL BCP-MCNC: 4 G/DL (ref 3.4–5)
ALP SERPL-CCNC: 103 U/L (ref 33–136)
ALT SERPL W P-5'-P-CCNC: 17 U/L (ref 10–52)
ANION GAP SERPL CALC-SCNC: 13 MMOL/L (ref 10–20)
AST SERPL W P-5'-P-CCNC: 20 U/L (ref 9–39)
BASOPHILS # BLD AUTO: 0.04 X10*3/UL (ref 0–0.1)
BASOPHILS NFR BLD AUTO: 0.7 %
BILIRUB SERPL-MCNC: 0.8 MG/DL (ref 0–1.2)
BUN SERPL-MCNC: 14 MG/DL (ref 6–23)
CALCIUM SERPL-MCNC: 9.1 MG/DL (ref 8.6–10.3)
CHLORIDE SERPL-SCNC: 103 MMOL/L (ref 98–107)
CO2 SERPL-SCNC: 28 MMOL/L (ref 21–32)
CREAT SERPL-MCNC: 0.88 MG/DL (ref 0.5–1.3)
EGFRCR SERPLBLD CKD-EPI 2021: 84 ML/MIN/1.73M*2
EOSINOPHIL # BLD AUTO: 0.13 X10*3/UL (ref 0–0.4)
EOSINOPHIL NFR BLD AUTO: 2.2 %
ERYTHROCYTE [DISTWIDTH] IN BLOOD BY AUTOMATED COUNT: 12.6 % (ref 11.5–14.5)
GLUCOSE SERPL-MCNC: 96 MG/DL (ref 74–99)
HCT VFR BLD AUTO: 41.6 % (ref 41–52)
HGB BLD-MCNC: 13.3 G/DL (ref 13.5–17.5)
IMM GRANULOCYTES # BLD AUTO: 0.01 X10*3/UL (ref 0–0.5)
IMM GRANULOCYTES NFR BLD AUTO: 0.2 % (ref 0–0.9)
LDH SERPL L TO P-CCNC: 172 U/L (ref 84–246)
LYMPHOCYTES # BLD AUTO: 1.1 X10*3/UL (ref 0.8–3)
LYMPHOCYTES NFR BLD AUTO: 18.6 %
MCH RBC QN AUTO: 30.2 PG (ref 26–34)
MCHC RBC AUTO-ENTMCNC: 32 G/DL (ref 32–36)
MCV RBC AUTO: 95 FL (ref 80–100)
MONOCYTES # BLD AUTO: 0.59 X10*3/UL (ref 0.05–0.8)
MONOCYTES NFR BLD AUTO: 10 %
NEUTROPHILS # BLD AUTO: 4.05 X10*3/UL (ref 1.6–5.5)
NEUTROPHILS NFR BLD AUTO: 68.3 %
PLATELET # BLD AUTO: 260 X10*3/UL (ref 150–450)
POTASSIUM SERPL-SCNC: 4.6 MMOL/L (ref 3.5–5.3)
PROT SERPL-MCNC: 7.2 G/DL (ref 6.4–8.2)
RBC # BLD AUTO: 4.4 X10*6/UL (ref 4.5–5.9)
SODIUM SERPL-SCNC: 139 MMOL/L (ref 136–145)
URATE SERPL-MCNC: 5.7 MG/DL (ref 4–7.5)
WBC # BLD AUTO: 5.9 X10*3/UL (ref 4.4–11.3)

## 2024-02-07 PROCEDURE — 96413 CHEMO IV INFUSION 1 HR: CPT

## 2024-02-07 PROCEDURE — 99214 OFFICE O/P EST MOD 30 MIN: CPT | Performed by: PHYSICIAN ASSISTANT

## 2024-02-07 PROCEDURE — 3079F DIAST BP 80-89 MM HG: CPT | Performed by: PHYSICIAN ASSISTANT

## 2024-02-07 PROCEDURE — 1126F AMNT PAIN NOTED NONE PRSNT: CPT | Performed by: PHYSICIAN ASSISTANT

## 2024-02-07 PROCEDURE — 2500000001 HC RX 250 WO HCPCS SELF ADMINISTERED DRUGS (ALT 637 FOR MEDICARE OP): Performed by: INTERNAL MEDICINE

## 2024-02-07 PROCEDURE — 1159F MED LIST DOCD IN RCRD: CPT | Performed by: PHYSICIAN ASSISTANT

## 2024-02-07 PROCEDURE — 2500000004 HC RX 250 GENERAL PHARMACY W/ HCPCS (ALT 636 FOR OP/ED): Mod: JZ | Performed by: INTERNAL MEDICINE

## 2024-02-07 PROCEDURE — 1160F RVW MEDS BY RX/DR IN RCRD: CPT | Performed by: PHYSICIAN ASSISTANT

## 2024-02-07 PROCEDURE — 36415 COLL VENOUS BLD VENIPUNCTURE: CPT

## 2024-02-07 PROCEDURE — 3075F SYST BP GE 130 - 139MM HG: CPT | Performed by: PHYSICIAN ASSISTANT

## 2024-02-07 PROCEDURE — 2500000004 HC RX 250 GENERAL PHARMACY W/ HCPCS (ALT 636 FOR OP/ED): Performed by: INTERNAL MEDICINE

## 2024-02-07 PROCEDURE — 96415 CHEMO IV INFUSION ADDL HR: CPT

## 2024-02-07 PROCEDURE — 96375 TX/PRO/DX INJ NEW DRUG ADDON: CPT | Mod: INF

## 2024-02-07 PROCEDURE — 1036F TOBACCO NON-USER: CPT | Performed by: PHYSICIAN ASSISTANT

## 2024-02-07 RX ORDER — ACETAMINOPHEN 325 MG/1
650 TABLET ORAL ONCE
Status: COMPLETED | OUTPATIENT
Start: 2024-02-07 | End: 2024-02-07

## 2024-02-07 RX ORDER — PROCHLORPERAZINE EDISYLATE 5 MG/ML
10 INJECTION INTRAMUSCULAR; INTRAVENOUS EVERY 6 HOURS PRN
Status: DISCONTINUED | OUTPATIENT
Start: 2024-02-07 | End: 2024-02-07 | Stop reason: HOSPADM

## 2024-02-07 RX ORDER — DIPHENHYDRAMINE HYDROCHLORIDE 50 MG/ML
50 INJECTION INTRAMUSCULAR; INTRAVENOUS AS NEEDED
Status: DISCONTINUED | OUTPATIENT
Start: 2024-02-07 | End: 2024-02-07 | Stop reason: HOSPADM

## 2024-02-07 RX ORDER — PROCHLORPERAZINE MALEATE 10 MG
10 TABLET ORAL EVERY 6 HOURS PRN
Status: DISCONTINUED | OUTPATIENT
Start: 2024-02-07 | End: 2024-02-07 | Stop reason: HOSPADM

## 2024-02-07 RX ORDER — DIPHENHYDRAMINE HCL 25 MG
50 CAPSULE ORAL ONCE
Status: COMPLETED | OUTPATIENT
Start: 2024-02-07 | End: 2024-02-07

## 2024-02-07 RX ORDER — FAMOTIDINE 10 MG/ML
20 INJECTION INTRAVENOUS ONCE
Status: CANCELLED
Start: 2024-02-14 | End: 2024-02-14

## 2024-02-07 RX ORDER — ALBUTEROL SULFATE 0.83 MG/ML
3 SOLUTION RESPIRATORY (INHALATION) AS NEEDED
Status: DISCONTINUED | OUTPATIENT
Start: 2024-02-07 | End: 2024-02-07 | Stop reason: HOSPADM

## 2024-02-07 RX ORDER — FAMOTIDINE 10 MG/ML
20 INJECTION INTRAVENOUS ONCE
Status: COMPLETED | OUTPATIENT
Start: 2024-02-07 | End: 2024-02-07

## 2024-02-07 RX ORDER — HEPARIN SODIUM,PORCINE/PF 10 UNIT/ML
50 SYRINGE (ML) INTRAVENOUS AS NEEDED
Status: CANCELLED | OUTPATIENT
Start: 2024-02-07

## 2024-02-07 RX ORDER — HEPARIN 100 UNIT/ML
500 SYRINGE INTRAVENOUS AS NEEDED
Status: CANCELLED | OUTPATIENT
Start: 2024-02-07

## 2024-02-07 RX ORDER — FAMOTIDINE 10 MG/ML
20 INJECTION INTRAVENOUS ONCE AS NEEDED
Status: DISCONTINUED | OUTPATIENT
Start: 2024-02-07 | End: 2024-02-07 | Stop reason: HOSPADM

## 2024-02-07 RX ORDER — EPINEPHRINE 0.3 MG/.3ML
0.3 INJECTION SUBCUTANEOUS EVERY 5 MIN PRN
Status: DISCONTINUED | OUTPATIENT
Start: 2024-02-07 | End: 2024-02-07 | Stop reason: HOSPADM

## 2024-02-07 RX ADMIN — FAMOTIDINE 20 MG: 10 INJECTION INTRAVENOUS at 10:21

## 2024-02-07 RX ADMIN — HYDROCORTISONE SODIUM SUCCINATE 100 MG: 100 INJECTION, POWDER, FOR SOLUTION INTRAMUSCULAR; INTRAVENOUS at 10:21

## 2024-02-07 RX ADMIN — SODIUM CHLORIDE 900 MG: 9 INJECTION, SOLUTION INTRAVENOUS at 10:52

## 2024-02-07 RX ADMIN — DIPHENHYDRAMINE HYDROCHLORIDE 50 MG: 25 CAPSULE ORAL at 10:21

## 2024-02-07 RX ADMIN — ACETAMINOPHEN 650 MG: 325 TABLET ORAL at 10:21

## 2024-02-07 ASSESSMENT — PAIN SCALES - GENERAL: PAINLEVEL: 0-NO PAIN

## 2024-02-07 NOTE — PROGRESS NOTES
1425. Rituximab infusion tolerated without incident. VS  monitored. Schedule reviewed then Dc'd via independent / ambulatory

## 2024-02-07 NOTE — SIGNIFICANT EVENT
02/07/24 1007   Prechemo Checklist   Has the patient been in the hospital, ED, or urgent care since last date of service No   Chemo/Immuno Consent Signed Yes   Protocol/Indications Verified Yes   Confirmed to previous date/time of medication Yes   Compared to previous dose Yes   All medications are dated accurately Yes   Pregnancy Test Negative Not applicable   Parameters Met Yes   BSA/Weight-Height Verified Yes   Dose Calculations Verified Yes

## 2024-02-07 NOTE — PROGRESS NOTES
RITUXIMAB RATES    Rate   Volume     37.8   18.9      75.6   37.8      113.4   56.7      151.2   226.6

## 2024-02-07 NOTE — PROGRESS NOTES
Patient ID: Angel Garibay is a 84 y.o. male.  Referring Physician: Velasquez Giordano MD  [id number] [place] [address]  Primary Care Provider: Wilbur Jurado DO      Subjective    HPI    83 year old gentleman with past medical history of HL, HTN, CAD, SVT, BPH, vit D deficiency, anxiety who is following for hx of low grade follicular lymphoma     Presented to evaluation in Nov/Dec 2018 with Dr. Nils Alejandre   noted small nodule in left neck; 2 lymph nodes were seen on ultrasound, one of which was excised showing and Low grade follicular non-hodgkin lymphoma.   11/13/2018 US neck  left submandibular space there is an enlarged hypoechoic lymph node with increased vascular flow and thickened cortex measuring approximately 2.3 x 1.7 x 2.4 cm in size. The cortex of the lymph node measures approximately 1.7 in thickness. Smaller lymph node more laterally measures approximately 1.6 x 1.2 x 1.3 cm in size  Left neck node excisional bx 11/27/18: low grade FL   12/24/18: CT neck: The left neck has multiple enlarged lymph nodes extending from level 1 B inferiorly to level 4.  12/24/18 CT C/A/P: Haziness of the mesenteric fat along the distribution of the SMA, with borderline lymph nodes, the appearance similar to slightly more apparent when compared to the prior examination of 08/14/2015.    Received weekly Rituximab x 4 from 2/7/19 to 2/28/19 8/5/19 CT C/A/P: no evidence of disease, no repeat CT neck but clinical response reported in notes     Followed with surveillance     11/20/23: CT neck: the parotid gland bilaterally measuring 2.2 x 2.0 cm on the right and 2.6 x 2.0 cm on the left. There are 2 nodules within the superficial lobe of the right parotid gland measuring 1.1 x 0.9 and 1.3 x 1.2 cm.  12/12/23: PET scan: 1. Right parotid gland with two superficial hypermetabolic soft tissue nodules and additional large hypermetabolic deep right parotid medial soft tissue lesion. Hypermetabolic  soft tissue density located  medial to the left parotid gland. Additional multiple bilateral cervical hypermetabolic lymphadenopathy. Findings are compatible with relapsed lymphoma.  2. Multiple subdiaphragmatic abdominal and pelvic hyper metabolic lymphadenopathy and multiple hypermetabolic abdominal and pelvic peritoneal implants, consistent with blessing and extranodal disease involvement.  3. Left kidney with irregular contour, consider further correlation with renal ultrasound.    12/14/23: LYMPH NODE, PAROTID, CORE BIOPSY:  -- CLASSIC FOLLICULAR LYMPHOMA (WHO 2022).  SEE NOTE.   -- FOLLICULAR LYMPHOMA, GRADE 1-2 (ICC 2022).    He is feeling very well   Reports mild reaction with Rituxan, mild sore throat which resolved with benadryl   He is active  He works outside in the summertime    Lives with his wife   Eating and drinking well  Denies weight loss   Denies fever or night sweats   Denies abdominal pain, nausea or vomiting   Some PATEL    Interval history - 2/7/2024  S/P Cycle 1 week 2 of weekly Rituxan. On assessment, patient reports tolerating treatment well. Notes that right sided parotid mass is slightly smaller and softer to touch. Appetite and energy are good. Denies B symptoms, n/v/d/abd pain, SOB, CP, bleeding from any source.     Past medical history     HL, HTN, CAD, SVT/PVCs, BPH, vit D deficiency, anxiety     Social history   Has one son   He worked in making airplanes   No smoking   Used to drink 1/5 a day, not recently     Family history   Father had prostate cancer     Review of Systems - Oncology   General: no change in appetite, no chills or fever  HEENT: no hearing loss, no mouth sores, no sore throat  EYES: no eyes problems   Respiratory: no cough, no shortness of breath   CV: no chest pain, no palpitations   GI: no abdominal pain, no diarrhea, no nausea or vomiting  : no difficulty urination, no dysuria or frequency   MSUC: no arthralgias, no back pan   Skin: no itching   Neurological: no  "dizziness, no headaches   Phychiatric: no anxiety or depression   Hematologic: no bruising, no adenopathy     Objective   BSA: 2.32 meters squared  /80 (BP Location: Left arm)   Pulse 92   Resp 16   Wt 112 kg (246 lb 14.6 oz)   SpO2 93%   BMI 37.21 kg/m²     Wt Readings from Last 3 Encounters:   02/07/24 112 kg (246 lb 14.6 oz)   01/31/24 112 kg (246 lb 11.1 oz)   01/24/24 112 kg (246 lb 9.4 oz)   ]      No family history on file.  Oncology History   Non-Hodgkin's lymphoma (CMS/HCC)   7/29/2019 Initial Diagnosis    Non-Hodgkin's lymphoma (CMS/HCC)     1/24/2024 -  Chemotherapy    RiTUXimab (Weekly), 28 Day Cycle          Angel Garibay  reports that he has never smoked. He has never used smokeless tobacco.  He  has no history on file for alcohol use.  He  has no history on file for drug use.    Labs:  Lab Results   Component Value Date    WBC 5.9 02/07/2024    NEUTROABS 4.05 02/07/2024    IGABSOL 0.01 02/07/2024    LYMPHSABS 1.10 02/07/2024    MONOSABS 0.59 02/07/2024    EOSABS 0.13 02/07/2024    BASOSABS 0.04 02/07/2024    RBC 4.40 (L) 02/07/2024    MCV 95 02/07/2024    MCHC 32.0 02/07/2024    HGB 13.3 (L) 02/07/2024    HCT 41.6 02/07/2024     02/07/2024     No results found for: \"RETICCTPCT\"   Lab Results   Component Value Date    CREATININE 0.88 02/07/2024    BUN 14 02/07/2024    EGFR 84 02/07/2024     02/07/2024    K 4.6 02/07/2024     02/07/2024    CO2 28 02/07/2024      Lab Results   Component Value Date    ALT 17 02/07/2024    AST 20 02/07/2024     (H) 06/15/2020    ALKPHOS 103 02/07/2024    BILITOT 0.8 02/07/2024      Lab Results   Component Value Date    TSH 1.48 07/18/2022     Lab Results   Component Value Date    TSH 1.48 07/18/2022     Lab Results   Component Value Date    TARAH NEGATIVE 12/16/2019    RF <10 12/16/2019    SEDRATE 29 (H) 12/16/2019      Lab Results   Component Value Date     02/07/2024     Lab Results   Component Value Date    HAPTOGLOBIN 125 " 01/25/2019     Physical Exam  Constitutional: alert, awake and oriented, not in acute distress   HEENT: moist mucous membranes, normal nose, palpable Right parotid mass-mobile.  Neck: supple, right parotid small nodules   EYES: PERRL, EOM intact, conjunctiva normal  CV: normal rate, regular rhythm, no murmur   Pulmonary: normal effort, no wheezing, equal air entry   Abdominal: soft, non tender, non distended, no palpable hepatosplenomegaly   : no CVA tenderness  Skin: no jaundice, rash or erythema  Neurological: AAOx3, no gross focal deficit   Psychiatric: normal mood and behavior   Lymph: no supraclavicular, axillary or inguinal LAD    Performance Status:  Symptomatic; fully ambulatory    Assessment/Plan      Recurrent Follicular Lymphoma     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 s/p Rituxan x 4 weekly doses in 2018   Now with evidence of widespread recurrence although not much symptomatic   Due to multiple areas of disease particularly in the abdomen/peritoneum at risk of complications would recommend treatment rather than surveillance at this time     We discussed re-treatment with Rituxan or Obinutuzumab vs anti-CD20 + Revlimid or Bendamustine, we discussed the pros and cons of each, he leans towards doing less and even considering not doing treatment     Recommended in this case that we do at least a repeat Rituximab course, he will think about it and let us know     1/24/2024-we discussed his options again and he expressed desire to proceed with treatment, we discussed his therapy options again including rituximab plus or minus additional agents  After our discussion the plan was to proceed with rituximab and reserve additional therapy in case of no response  Will plan for 4 weekly doses of Rituxan followed by restaging PET scan 1 month after completion at which point we will also discuss maintenance treatment.    2/7/2024: Continue with week 3 of Rituxan. Plan for  restaging PET/CT in 1 month.    RTC in 2 weeks with MD.    Cindy Kamara PA-C

## 2024-02-13 NOTE — PROGRESS NOTES
Subjective   Patient ID: Angel Garibay is a 84 y.o. male who presents for Follow-up (6 month follow up).    HPI     Following with oncology for relapsed HL. Currently undergoing Rituxan chemo--tolerating without issues.     Occasionally taking chlorazepate twice daily for PVCs, anxiety and sleep. Denies falls, confusion or med side effects. Greatly helps with his quality of life. No signs of abuse or misuse     No other new issues     Review of Systems    Objective   There were no vitals taken for this visit.    Physical Exam  Constitutional:       Appearance: Normal appearance.   HENT:      Head: Normocephalic and atraumatic.   Cardiovascular:      Rate and Rhythm: Normal rate and regular rhythm.      Heart sounds: Normal heart sounds. No murmur heard.     No gallop.   Pulmonary:      Effort: Pulmonary effort is normal. No respiratory distress.      Breath sounds: No wheezing or rales.   Skin:     General: Skin is warm and dry.      Findings: No rash.   Neurological:      Mental Status: He is alert and oriented to person, place, and time. Mental status is at baseline.   Psychiatric:         Mood and Affect: Mood normal.         Behavior: Behavior normal.         Assessment/Plan       #Anxiety and insomnia   -Cont Clorazepate 15mg daily. Recommended not taking BID due to meds long half life and potential side effects.   -UDS 8/7/23  -CSA 8/7/23  -I have personally reviewed the OARRS report for . This report is scanned into the electronic medical record. I have considered the risks of abuse, dependence, addiction and diversion.    #Hodgkin's lymphoma   -Following with oncology. Currently getting Rituxan      #HTN  -Well contolled. Con losartan 50mg daily and metoprolol XL 50mg daily       #HLD  -Cont atorvastatin 40mg daily  -Will check lipids when chemo is completed

## 2024-02-14 ENCOUNTER — INFUSION (OUTPATIENT)
Dept: HEMATOLOGY/ONCOLOGY | Facility: CLINIC | Age: 84
End: 2024-02-14
Payer: MEDICARE

## 2024-02-14 VITALS
OXYGEN SATURATION: 93 % | SYSTOLIC BLOOD PRESSURE: 140 MMHG | WEIGHT: 242.73 LBS | DIASTOLIC BLOOD PRESSURE: 74 MMHG | TEMPERATURE: 97.9 F | BODY MASS INDEX: 36.58 KG/M2 | HEART RATE: 66 BPM | RESPIRATION RATE: 16 BRPM

## 2024-02-14 DIAGNOSIS — C82.18 GRADE 2 FOLLICULAR LYMPHOMA OF LYMPH NODES OF MULTIPLE REGIONS (MULTI): ICD-10-CM

## 2024-02-14 LAB
ALBUMIN SERPL BCP-MCNC: 4.1 G/DL (ref 3.4–5)
ALP SERPL-CCNC: 93 U/L (ref 33–136)
ALT SERPL W P-5'-P-CCNC: 16 U/L (ref 10–52)
ANION GAP SERPL CALC-SCNC: 11 MMOL/L (ref 10–20)
AST SERPL W P-5'-P-CCNC: 21 U/L (ref 9–39)
BASOPHILS # BLD AUTO: 0.03 X10*3/UL (ref 0–0.1)
BASOPHILS NFR BLD AUTO: 0.5 %
BILIRUB SERPL-MCNC: 0.6 MG/DL (ref 0–1.2)
BUN SERPL-MCNC: 14 MG/DL (ref 6–23)
CALCIUM SERPL-MCNC: 9.4 MG/DL (ref 8.6–10.3)
CHLORIDE SERPL-SCNC: 102 MMOL/L (ref 98–107)
CO2 SERPL-SCNC: 27 MMOL/L (ref 21–32)
CREAT SERPL-MCNC: 0.85 MG/DL (ref 0.5–1.3)
EGFRCR SERPLBLD CKD-EPI 2021: 85 ML/MIN/1.73M*2
EOSINOPHIL # BLD AUTO: 0.17 X10*3/UL (ref 0–0.4)
EOSINOPHIL NFR BLD AUTO: 3 %
ERYTHROCYTE [DISTWIDTH] IN BLOOD BY AUTOMATED COUNT: 12.8 % (ref 11.5–14.5)
GLUCOSE SERPL-MCNC: 99 MG/DL (ref 74–99)
HCT VFR BLD AUTO: 42.5 % (ref 41–52)
HGB BLD-MCNC: 13.6 G/DL (ref 13.5–17.5)
IMM GRANULOCYTES # BLD AUTO: 0.01 X10*3/UL (ref 0–0.5)
IMM GRANULOCYTES NFR BLD AUTO: 0.2 % (ref 0–0.9)
LDH SERPL L TO P-CCNC: 165 U/L (ref 84–246)
LYMPHOCYTES # BLD AUTO: 0.93 X10*3/UL (ref 0.8–3)
LYMPHOCYTES NFR BLD AUTO: 16.2 %
MCH RBC QN AUTO: 30.4 PG (ref 26–34)
MCHC RBC AUTO-ENTMCNC: 32 G/DL (ref 32–36)
MCV RBC AUTO: 95 FL (ref 80–100)
MONOCYTES # BLD AUTO: 0.49 X10*3/UL (ref 0.05–0.8)
MONOCYTES NFR BLD AUTO: 8.5 %
NEUTROPHILS # BLD AUTO: 4.11 X10*3/UL (ref 1.6–5.5)
NEUTROPHILS NFR BLD AUTO: 71.6 %
PLATELET # BLD AUTO: 284 X10*3/UL (ref 150–450)
POTASSIUM SERPL-SCNC: 3.9 MMOL/L (ref 3.5–5.3)
PROT SERPL-MCNC: 7.3 G/DL (ref 6.4–8.2)
RBC # BLD AUTO: 4.47 X10*6/UL (ref 4.5–5.9)
SODIUM SERPL-SCNC: 136 MMOL/L (ref 136–145)
URATE SERPL-MCNC: 6.4 MG/DL (ref 4–7.5)
WBC # BLD AUTO: 5.7 X10*3/UL (ref 4.4–11.3)

## 2024-02-14 PROCEDURE — 83615 LACTATE (LD) (LDH) ENZYME: CPT | Performed by: INTERNAL MEDICINE

## 2024-02-14 PROCEDURE — 2500000004 HC RX 250 GENERAL PHARMACY W/ HCPCS (ALT 636 FOR OP/ED): Performed by: INTERNAL MEDICINE

## 2024-02-14 PROCEDURE — 96413 CHEMO IV INFUSION 1 HR: CPT

## 2024-02-14 PROCEDURE — 85025 COMPLETE CBC W/AUTO DIFF WBC: CPT | Performed by: INTERNAL MEDICINE

## 2024-02-14 PROCEDURE — 2500000001 HC RX 250 WO HCPCS SELF ADMINISTERED DRUGS (ALT 637 FOR MEDICARE OP): Performed by: INTERNAL MEDICINE

## 2024-02-14 PROCEDURE — 80053 COMPREHEN METABOLIC PANEL: CPT | Performed by: INTERNAL MEDICINE

## 2024-02-14 PROCEDURE — 96415 CHEMO IV INFUSION ADDL HR: CPT

## 2024-02-14 PROCEDURE — 96375 TX/PRO/DX INJ NEW DRUG ADDON: CPT | Mod: INF

## 2024-02-14 PROCEDURE — 84550 ASSAY OF BLOOD/URIC ACID: CPT | Performed by: INTERNAL MEDICINE

## 2024-02-14 RX ORDER — DIPHENHYDRAMINE HYDROCHLORIDE 50 MG/ML
50 INJECTION INTRAMUSCULAR; INTRAVENOUS AS NEEDED
Status: DISCONTINUED | OUTPATIENT
Start: 2024-02-14 | End: 2024-02-14 | Stop reason: HOSPADM

## 2024-02-14 RX ORDER — HEPARIN SODIUM,PORCINE/PF 10 UNIT/ML
50 SYRINGE (ML) INTRAVENOUS AS NEEDED
OUTPATIENT
Start: 2024-02-14

## 2024-02-14 RX ORDER — FAMOTIDINE 10 MG/ML
20 INJECTION INTRAVENOUS ONCE AS NEEDED
Status: DISCONTINUED | OUTPATIENT
Start: 2024-02-14 | End: 2024-02-14 | Stop reason: HOSPADM

## 2024-02-14 RX ORDER — HEPARIN 100 UNIT/ML
500 SYRINGE INTRAVENOUS AS NEEDED
OUTPATIENT
Start: 2024-02-14

## 2024-02-14 RX ORDER — EPINEPHRINE 0.3 MG/.3ML
0.3 INJECTION SUBCUTANEOUS EVERY 5 MIN PRN
Status: DISCONTINUED | OUTPATIENT
Start: 2024-02-14 | End: 2024-02-14 | Stop reason: HOSPADM

## 2024-02-14 RX ORDER — ALBUTEROL SULFATE 0.83 MG/ML
3 SOLUTION RESPIRATORY (INHALATION) AS NEEDED
Status: DISCONTINUED | OUTPATIENT
Start: 2024-02-14 | End: 2024-02-14 | Stop reason: HOSPADM

## 2024-02-14 RX ORDER — FAMOTIDINE 10 MG/ML
20 INJECTION INTRAVENOUS ONCE
Status: COMPLETED | OUTPATIENT
Start: 2024-02-14 | End: 2024-02-14

## 2024-02-14 RX ORDER — ACETAMINOPHEN 325 MG/1
650 TABLET ORAL ONCE
Status: COMPLETED | OUTPATIENT
Start: 2024-02-14 | End: 2024-02-14

## 2024-02-14 RX ORDER — PROCHLORPERAZINE MALEATE 10 MG
10 TABLET ORAL EVERY 6 HOURS PRN
Status: DISCONTINUED | OUTPATIENT
Start: 2024-02-14 | End: 2024-02-14 | Stop reason: HOSPADM

## 2024-02-14 RX ORDER — PROCHLORPERAZINE EDISYLATE 5 MG/ML
10 INJECTION INTRAMUSCULAR; INTRAVENOUS EVERY 6 HOURS PRN
Status: DISCONTINUED | OUTPATIENT
Start: 2024-02-14 | End: 2024-02-14 | Stop reason: HOSPADM

## 2024-02-14 RX ORDER — DIPHENHYDRAMINE HCL 25 MG
50 CAPSULE ORAL ONCE
Status: COMPLETED | OUTPATIENT
Start: 2024-02-14 | End: 2024-02-14

## 2024-02-14 RX ADMIN — SODIUM CHLORIDE 900 MG: 9 INJECTION, SOLUTION INTRAVENOUS at 11:04

## 2024-02-14 RX ADMIN — HYDROCORTISONE SODIUM SUCCINATE 100 MG: 100 INJECTION, POWDER, FOR SOLUTION INTRAMUSCULAR; INTRAVENOUS at 10:26

## 2024-02-14 RX ADMIN — ACETAMINOPHEN 650 MG: 325 TABLET ORAL at 10:26

## 2024-02-14 RX ADMIN — DIPHENHYDRAMINE HYDROCHLORIDE 50 MG: 25 CAPSULE ORAL at 10:26

## 2024-02-14 RX ADMIN — FAMOTIDINE 20 MG: 10 INJECTION INTRAVENOUS at 10:26

## 2024-02-14 ASSESSMENT — PAIN SCALES - GENERAL: PAINLEVEL: 0-NO PAIN

## 2024-02-14 NOTE — PROGRESS NOTES
1425. Rituximab infusion tolerated without incident. VS monitored. Schedule reviewed then Dc'd via independent / ambulatory.

## 2024-02-14 NOTE — SIGNIFICANT EVENT
02/14/24 0957   Prechemo Checklist   Has the patient been in the hospital, ED, or urgent care since last date of service No   Chemo/Immuno Consent Signed Yes   Protocol/Indications Verified Yes   Confirmed to previous date/time of medication Yes   Compared to previous dose Yes   All medications are dated accurately Yes   Pregnancy Test Negative Not applicable   Parameters Met Yes   BSA/Weight-Height Verified Yes   Dose Calculations Verified Yes

## 2024-02-15 ENCOUNTER — OFFICE VISIT (OUTPATIENT)
Dept: PRIMARY CARE | Facility: CLINIC | Age: 84
End: 2024-02-15
Payer: MEDICARE

## 2024-02-15 VITALS
OXYGEN SATURATION: 93 % | DIASTOLIC BLOOD PRESSURE: 69 MMHG | BODY MASS INDEX: 36.77 KG/M2 | WEIGHT: 244 LBS | SYSTOLIC BLOOD PRESSURE: 148 MMHG | HEART RATE: 93 BPM

## 2024-02-15 DIAGNOSIS — I10 ESSENTIAL HYPERTENSION: ICD-10-CM

## 2024-02-15 DIAGNOSIS — E78.5 HYPERLIPIDEMIA, UNSPECIFIED HYPERLIPIDEMIA TYPE: Primary | ICD-10-CM

## 2024-02-15 PROCEDURE — 3078F DIAST BP <80 MM HG: CPT | Performed by: INTERNAL MEDICINE

## 2024-02-15 PROCEDURE — 1126F AMNT PAIN NOTED NONE PRSNT: CPT | Performed by: INTERNAL MEDICINE

## 2024-02-15 PROCEDURE — 1160F RVW MEDS BY RX/DR IN RCRD: CPT | Performed by: INTERNAL MEDICINE

## 2024-02-15 PROCEDURE — 1036F TOBACCO NON-USER: CPT | Performed by: INTERNAL MEDICINE

## 2024-02-15 PROCEDURE — 99214 OFFICE O/P EST MOD 30 MIN: CPT | Performed by: INTERNAL MEDICINE

## 2024-02-15 PROCEDURE — 1159F MED LIST DOCD IN RCRD: CPT | Performed by: INTERNAL MEDICINE

## 2024-02-15 PROCEDURE — 3077F SYST BP >= 140 MM HG: CPT | Performed by: INTERNAL MEDICINE

## 2024-02-15 RX ORDER — ATORVASTATIN CALCIUM 40 MG/1
TABLET, FILM COATED ORAL
Qty: 90 TABLET | Refills: 2 | Status: SHIPPED | OUTPATIENT
Start: 2024-02-15

## 2024-02-15 RX ORDER — LOSARTAN POTASSIUM 50 MG/1
50 TABLET ORAL DAILY
Qty: 90 TABLET | Refills: 2 | Status: SHIPPED | OUTPATIENT
Start: 2024-02-15

## 2024-02-21 ENCOUNTER — OFFICE VISIT (OUTPATIENT)
Dept: HEMATOLOGY/ONCOLOGY | Facility: CLINIC | Age: 84
End: 2024-02-21
Payer: MEDICARE

## 2024-02-21 VITALS
TEMPERATURE: 97.3 F | DIASTOLIC BLOOD PRESSURE: 82 MMHG | HEART RATE: 84 BPM | WEIGHT: 240.96 LBS | OXYGEN SATURATION: 96 % | SYSTOLIC BLOOD PRESSURE: 135 MMHG | RESPIRATION RATE: 18 BRPM | BODY MASS INDEX: 36.31 KG/M2

## 2024-02-21 DIAGNOSIS — C82.18 GRADE 2 FOLLICULAR LYMPHOMA OF LYMPH NODES OF MULTIPLE REGIONS (MULTI): ICD-10-CM

## 2024-02-21 PROCEDURE — 1159F MED LIST DOCD IN RCRD: CPT | Performed by: INTERNAL MEDICINE

## 2024-02-21 PROCEDURE — 1160F RVW MEDS BY RX/DR IN RCRD: CPT | Performed by: INTERNAL MEDICINE

## 2024-02-21 PROCEDURE — 3079F DIAST BP 80-89 MM HG: CPT | Performed by: INTERNAL MEDICINE

## 2024-02-21 PROCEDURE — 1036F TOBACCO NON-USER: CPT | Performed by: INTERNAL MEDICINE

## 2024-02-21 PROCEDURE — 99213 OFFICE O/P EST LOW 20 MIN: CPT | Performed by: INTERNAL MEDICINE

## 2024-02-21 PROCEDURE — 1126F AMNT PAIN NOTED NONE PRSNT: CPT | Performed by: INTERNAL MEDICINE

## 2024-02-21 PROCEDURE — 3075F SYST BP GE 130 - 139MM HG: CPT | Performed by: INTERNAL MEDICINE

## 2024-02-21 ASSESSMENT — PAIN SCALES - GENERAL: PAINLEVEL: 0-NO PAIN

## 2024-02-21 NOTE — PROGRESS NOTES
Patient ID: Angel Garibay is a 84 y.o. male.  Referring Physician: Velasquez Giordano MD  [id number] [place] [address]  Primary Care Provider: Wilbur Jurado DO      Subjective    HPI    83 year old gentleman with past medical history of HL, HTN, CAD, SVT, BPH, vit D deficiency, anxiety who is following for hx of low grade follicular lymphoma     Presented to evaluation in Nov/Dec 2018 with Dr. Nils Alejandre   noted small nodule in left neck; 2 lymph nodes were seen on ultrasound, one of which was excised showing and Low grade follicular non-hodgkin lymphoma.   11/13/2018 US neck  left submandibular space there is an enlarged hypoechoic lymph node with increased vascular flow and thickened cortex measuring approximately 2.3 x 1.7 x 2.4 cm in size. The cortex of the lymph node measures approximately 1.7 in thickness. Smaller lymph node more laterally measures approximately 1.6 x 1.2 x 1.3 cm in size  Left neck node excisional bx 11/27/18: low grade FL   12/24/18: CT neck: The left neck has multiple enlarged lymph nodes extending from level 1 B inferiorly to level 4.  12/24/18 CT C/A/P: Haziness of the mesenteric fat along the distribution of the SMA, with borderline lymph nodes, the appearance similar to slightly more apparent when compared to the prior examination of 08/14/2015.    Received weekly Rituximab x 4 from 2/7/19 to 2/28/19 8/5/19 CT C/A/P: no evidence of disease, no repeat CT neck but clinical response reported in notes     Followed with surveillance     11/20/23: CT neck: the parotid gland bilaterally measuring 2.2 x 2.0 cm on the right and 2.6 x 2.0 cm on the left. There are 2 nodules within the superficial lobe of the right parotid gland measuring 1.1 x 0.9 and 1.3 x 1.2 cm.  12/12/23: PET scan: 1. Right parotid gland with two superficial hypermetabolic soft tissue nodules and additional large hypermetabolic deep right parotid medial soft tissue lesion. Hypermetabolic  soft tissue density located  medial to the left parotid gland. Additional multiple bilateral cervical hypermetabolic lymphadenopathy. Findings are compatible with relapsed lymphoma.  2. Multiple subdiaphragmatic abdominal and pelvic hyper metabolic lymphadenopathy and multiple hypermetabolic abdominal and pelvic peritoneal implants, consistent with blessing and extranodal disease involvement.  3. Left kidney with irregular contour, consider further correlation with renal ultrasound.    12/14/23: LYMPH NODE, PAROTID, CORE BIOPSY:  -- CLASSIC FOLLICULAR LYMPHOMA (WHO 2022).  SEE NOTE.   -- FOLLICULAR LYMPHOMA, GRADE 1-2 (ICC 2022).    He is feeling very well   Reports mild reaction with Rituxan, mild sore throat which resolved with benadryl   He is active  He works outside in the summertime    Lives with his wife   Eating and drinking well  Denies weight loss   Denies fever or night sweats   Denies abdominal pain, nausea or vomiting   Some PATEL    Interval history - 2/21/24    He feels well   He tolerated the infusions well   He is concerned about the cost of the medications   Eating and drinking well   Lost some weight on intention   He modified his diet   No abdominal pain   He denies any nausea or vomiting   No changes in his BM   Swallowing normal   No fever   Energy at baseline and no excessive fatigue   No additional new complaints    Past medical history     HL, HTN, CAD, SVT/PVCs, BPH, vit D deficiency, anxiety     Social history   Has one son   He worked in making airplanes   No smoking   Used to drink 1/5 a day, not recently     Family history   Father had prostate cancer     Review of Systems - Oncology   General: no change in appetite, no chills or fever  HEENT: no hearing loss, no mouth sores, no sore throat  EYES: no eyes problems   Respiratory: no cough, no shortness of breath   CV: no chest pain, no palpitations   GI: no abdominal pain, no diarrhea, no nausea or vomiting  : no difficulty urination, no  dysuria or frequency   MSUC: no arthralgias, no back pan   Skin: no itching   Neurological: no dizziness, no headaches   Phychiatric: no anxiety or depression   Hematologic: no bruising, no adenopathy     Objective   BSA: 2.29 meters squared  /82 (BP Location: Left arm, Patient Position: Sitting, BP Cuff Size: Large adult)   Pulse 84   Temp 36.3 °C (97.3 °F) (Temporal)   Resp 18   Wt 109 kg (240 lb 15.4 oz)   SpO2 96%   BMI 36.31 kg/m²     Wt Readings from Last 3 Encounters:   02/21/24 109 kg (240 lb 15.4 oz)   02/15/24 111 kg (244 lb)   02/14/24 110 kg (242 lb 11.6 oz)   ]      No family history on file.  Oncology History   Non-Hodgkin's lymphoma (CMS/HCC)   7/29/2019 Initial Diagnosis    Non-Hodgkin's lymphoma (CMS/HCC)     1/24/2024 -  Chemotherapy    RiTUXimab (Weekly), 28 Day Cycle          Angel Garibay  reports that he has never smoked. He has never used smokeless tobacco.  He  has no history on file for alcohol use.  He  has no history on file for drug use.    Physical Exam  Constitutional: alert, awake and oriented, not in acute distress   HEENT: moist mucous membranes, normal nose   Neck: supple, right parotid per facial mass/nodule still present at around the same size  EYES: PERRL, EOM intact, conjunctiva normal  CV: normal rate, regular rhythm, no murmur   Pulmonary: normal effort, no wheezing, equal air entry   Abdominal: soft, non tender, non distended, no palpable hepatosplenomegaly   : no CVA tenderness  Skin: no jaundice, rash or erythema  Neurological: AAOx3, no gross focal deficit   Psychiatric: normal mood and behavior   Lymph: no supraclavicular, axillary or inguinal LAD    Performance Status:  Symptomatic; fully ambulatory    Assessment/Plan      Recurrent Follicular Lymphoma     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 s/p Rituxan x 4 weekly doses in 2018   Now with evidence of widespread recurrence although not much symptomatic   Due to  multiple areas of disease particularly in the abdomen/peritoneum at risk of complications would recommend treatment rather than surveillance at this time     We discussed re-treatment with Rituxan or Obinutuzumab vs anti-CD20 + Revlimid or Bendamustine, we discussed the pros and cons of each, he leans towards doing less and even considering not doing treatment     Recommended in this case that we do at least a repeat Rituximab course, he will think about it and let us know     1/24/2024-we discussed his options again and he expressed desire to proceed with treatment, we discussed his therapy options again including rituximab plus or minus additional agents  After our discussion the plan was to proceed with rituximab and reserve additional therapy in case of no response  Will plan for 4 weekly doses of Rituxan followed by restaging PET scan 1 month after completion at which point we will also discuss maintenance treatment    2/21/2024-status post 4 doses of weekly rituximab with good tolerance  We will follow-up restaging PET scan in around a month  If there is any concern we will consider biopsying the parotid gland as the original SUV was high to rule out any transformation  If there is response then we will discuss maintenance rituximab    RTC 4 weeks with a PET scan    Velasquez Giordano MD

## 2024-02-22 ENCOUNTER — SOCIAL WORK (OUTPATIENT)
Dept: CASE MANAGEMENT | Facility: HOSPITAL | Age: 84
End: 2024-02-22
Payer: MEDICARE

## 2024-02-22 NOTE — PROGRESS NOTES
PETEY Barnes asked writer to reach out to pt regarding financial concerns for his potential future infusions. Called pt to discuss. Pt reports he underwent chemo infusions about six years ago which put the cancer into remission. However, he incurred a $6,000 bill from  and had to spend months negotiating it until  finally wiped the bill. Pt explained that he does not want to incur another large bill like that and have to go through all of the back and forth with billing. Discussed how decision for infusion has not been made yet and is pending on his PET scan next month. Also discussed how Medicare rules have changed in the past six years and there is no guarantee that the bill would be so high this time. Talked about how if pt does need the infusions, we can apply him for HCAP ahead of time and get him in contact with financial counselors before a bill is drafted. There also grants and copay assistance options for many patients. Encouraged pt not to make his medical decisions based on finances as there are options for him to pursue. Pt was agreeable to this. Also screened pt for LIS application but he believes they are over income. Pt agreed with writer to revisit conversation after his PET scan and appt with Dr. Giordano on 3/27/23. Encouraged pt to reach out with any additional questions or concerns.   Carmen Miranda, MSW, LSW

## 2024-03-25 ENCOUNTER — HOSPITAL ENCOUNTER (OUTPATIENT)
Dept: RADIOLOGY | Facility: HOSPITAL | Age: 84
Discharge: HOME | End: 2024-03-25
Payer: MEDICARE

## 2024-03-25 DIAGNOSIS — C82.18 GRADE 2 FOLLICULAR LYMPHOMA OF LYMPH NODES OF MULTIPLE REGIONS (MULTI): ICD-10-CM

## 2024-03-25 PROCEDURE — A9552 F18 FDG: HCPCS | Performed by: INTERNAL MEDICINE

## 2024-03-25 PROCEDURE — 78815 PET IMAGE W/CT SKULL-THIGH: CPT | Mod: PS

## 2024-03-25 PROCEDURE — 3430000001 HC RX 343 DIAGNOSTIC RADIOPHARMACEUTICALS: Performed by: INTERNAL MEDICINE

## 2024-03-25 PROCEDURE — 78815 PET IMAGE W/CT SKULL-THIGH: CPT | Mod: PET TUMOR SUBSQ TX STRATEGY | Performed by: NUCLEAR MEDICINE

## 2024-03-25 RX ORDER — FLUDEOXYGLUCOSE F 18 200 MCI/ML
10.1 INJECTION, SOLUTION INTRAVENOUS
Status: COMPLETED | OUTPATIENT
Start: 2024-03-25 | End: 2024-03-25

## 2024-03-25 RX ADMIN — FLUDEOXYGLUCOSE F 18 10.1 MILLICURIE: 200 INJECTION, SOLUTION INTRAVENOUS at 08:28

## 2024-03-27 ENCOUNTER — OFFICE VISIT (OUTPATIENT)
Dept: HEMATOLOGY/ONCOLOGY | Facility: CLINIC | Age: 84
End: 2024-03-27
Payer: MEDICARE

## 2024-03-27 VITALS
TEMPERATURE: 97.5 F | DIASTOLIC BLOOD PRESSURE: 79 MMHG | BODY MASS INDEX: 36.41 KG/M2 | RESPIRATION RATE: 16 BRPM | HEART RATE: 83 BPM | WEIGHT: 241.62 LBS | SYSTOLIC BLOOD PRESSURE: 131 MMHG | OXYGEN SATURATION: 94 %

## 2024-03-27 DIAGNOSIS — C82.18 GRADE 2 FOLLICULAR LYMPHOMA OF LYMPH NODES OF MULTIPLE REGIONS (MULTI): ICD-10-CM

## 2024-03-27 DIAGNOSIS — C82.91: Primary | ICD-10-CM

## 2024-03-27 LAB
ALBUMIN SERPL BCP-MCNC: 3.9 G/DL (ref 3.4–5)
ALP SERPL-CCNC: 93 U/L (ref 33–136)
ALT SERPL W P-5'-P-CCNC: 20 U/L (ref 10–52)
ANION GAP SERPL CALC-SCNC: 10 MMOL/L (ref 10–20)
AST SERPL W P-5'-P-CCNC: 21 U/L (ref 9–39)
BASOPHILS # BLD AUTO: 0.04 X10*3/UL (ref 0–0.1)
BASOPHILS NFR BLD AUTO: 0.7 %
BILIRUB SERPL-MCNC: 0.6 MG/DL (ref 0–1.2)
BUN SERPL-MCNC: 15 MG/DL (ref 6–23)
CALCIUM SERPL-MCNC: 8.8 MG/DL (ref 8.6–10.3)
CHLORIDE SERPL-SCNC: 104 MMOL/L (ref 98–107)
CO2 SERPL-SCNC: 28 MMOL/L (ref 21–32)
CREAT SERPL-MCNC: 0.88 MG/DL (ref 0.5–1.3)
EGFRCR SERPLBLD CKD-EPI 2021: 84 ML/MIN/1.73M*2
EOSINOPHIL # BLD AUTO: 0.2 X10*3/UL (ref 0–0.4)
EOSINOPHIL NFR BLD AUTO: 3.6 %
ERYTHROCYTE [DISTWIDTH] IN BLOOD BY AUTOMATED COUNT: 12.8 % (ref 11.5–14.5)
GLUCOSE SERPL-MCNC: 98 MG/DL (ref 74–99)
HCT VFR BLD AUTO: 40.2 % (ref 41–52)
HGB BLD-MCNC: 12.7 G/DL (ref 13.5–17.5)
IMM GRANULOCYTES # BLD AUTO: 0 X10*3/UL (ref 0–0.5)
IMM GRANULOCYTES NFR BLD AUTO: 0 % (ref 0–0.9)
LDH SERPL L TO P-CCNC: 170 U/L (ref 84–246)
LYMPHOCYTES # BLD AUTO: 1.13 X10*3/UL (ref 0.8–3)
LYMPHOCYTES NFR BLD AUTO: 20.4 %
MCH RBC QN AUTO: 30.4 PG (ref 26–34)
MCHC RBC AUTO-ENTMCNC: 31.6 G/DL (ref 32–36)
MCV RBC AUTO: 96 FL (ref 80–100)
MONOCYTES # BLD AUTO: 0.59 X10*3/UL (ref 0.05–0.8)
MONOCYTES NFR BLD AUTO: 10.6 %
NEUTROPHILS # BLD AUTO: 3.58 X10*3/UL (ref 1.6–5.5)
NEUTROPHILS NFR BLD AUTO: 64.7 %
PLATELET # BLD AUTO: 248 X10*3/UL (ref 150–450)
POTASSIUM SERPL-SCNC: 4 MMOL/L (ref 3.5–5.3)
PROT SERPL-MCNC: 6.5 G/DL (ref 6.4–8.2)
RBC # BLD AUTO: 4.18 X10*6/UL (ref 4.5–5.9)
SODIUM SERPL-SCNC: 138 MMOL/L (ref 136–145)
WBC # BLD AUTO: 5.5 X10*3/UL (ref 4.4–11.3)

## 2024-03-27 PROCEDURE — 36415 COLL VENOUS BLD VENIPUNCTURE: CPT | Performed by: INTERNAL MEDICINE

## 2024-03-27 PROCEDURE — 1126F AMNT PAIN NOTED NONE PRSNT: CPT | Performed by: INTERNAL MEDICINE

## 2024-03-27 PROCEDURE — 99213 OFFICE O/P EST LOW 20 MIN: CPT | Performed by: INTERNAL MEDICINE

## 2024-03-27 PROCEDURE — 1160F RVW MEDS BY RX/DR IN RCRD: CPT | Performed by: INTERNAL MEDICINE

## 2024-03-27 PROCEDURE — 3078F DIAST BP <80 MM HG: CPT | Performed by: INTERNAL MEDICINE

## 2024-03-27 PROCEDURE — 3075F SYST BP GE 130 - 139MM HG: CPT | Performed by: INTERNAL MEDICINE

## 2024-03-27 PROCEDURE — 1159F MED LIST DOCD IN RCRD: CPT | Performed by: INTERNAL MEDICINE

## 2024-03-27 ASSESSMENT — PAIN SCALES - GENERAL: PAINLEVEL: 0-NO PAIN

## 2024-03-27 NOTE — PATIENT INSTRUCTIONS
Today you met with your hematologist/oncologist.  Recent labs were discussed and questions answered.  Scheduling orders were placed.  While we appreciate that you verbalized understanding, if any questions arise after leaving, please do not hesitate to call the office to discuss.  [phone] Katharina Lorenz.  You will see Dr Amalia Hay for your biopsy and follow up with Dr Giordano about a week later to review the results. Information on a lymph node biopsy has been sent to your MyChart. Please feel free to call the office with any questions you may have concerning the procedure.

## 2024-03-27 NOTE — H&P (VIEW-ONLY)
Patient ID: Angel Garibay is a 84 y.o. male.  Referring Physician: Velasquez Giordano MD  [id number] [place] [address]  Primary Care Provider: Wilbur Jurado DO      Subjective    HPI    83 year old gentleman with past medical history of HL, HTN, CAD, SVT, BPH, vit D deficiency, anxiety who is following for hx of low grade follicular lymphoma     Presented to evaluation in Nov/Dec 2018 with Dr. Nils Alejandre   noted small nodule in left neck; 2 lymph nodes were seen on ultrasound, one of which was excised showing and Low grade follicular non-hodgkin lymphoma.   11/13/2018 US neck  left submandibular space there is an enlarged hypoechoic lymph node with increased vascular flow and thickened cortex measuring approximately 2.3 x 1.7 x 2.4 cm in size. The cortex of the lymph node measures approximately 1.7 in thickness. Smaller lymph node more laterally measures approximately 1.6 x 1.2 x 1.3 cm in size  Left neck node excisional bx 11/27/18: low grade FL   12/24/18: CT neck: The left neck has multiple enlarged lymph nodes extending from level 1 B inferiorly to level 4.  12/24/18 CT C/A/P: Haziness of the mesenteric fat along the distribution of the SMA, with borderline lymph nodes, the appearance similar to slightly more apparent when compared to the prior examination of 08/14/2015.    Received weekly Rituximab x 4 from 2/7/19 to 2/28/19 8/5/19 CT C/A/P: no evidence of disease, no repeat CT neck but clinical response reported in notes     Followed with surveillance     11/20/23: CT neck: the parotid gland bilaterally measuring 2.2 x 2.0 cm on the right and 2.6 x 2.0 cm on the left. There are 2 nodules within the superficial lobe of the right parotid gland measuring 1.1 x 0.9 and 1.3 x 1.2 cm.  12/12/23: PET scan: 1. Right parotid gland with two superficial hypermetabolic soft tissue nodules and additional large hypermetabolic deep right parotid medial soft tissue lesion. Hypermetabolic  soft tissue density located  medial to the left parotid gland. Additional multiple bilateral cervical hypermetabolic lymphadenopathy. Findings are compatible with relapsed lymphoma.  2. Multiple subdiaphragmatic abdominal and pelvic hyper metabolic lymphadenopathy and multiple hypermetabolic abdominal and pelvic peritoneal implants, consistent with blessing and extranodal disease involvement.  3. Left kidney with irregular contour, consider further correlation with renal ultrasound.    12/14/23: LYMPH NODE, PAROTID, CORE BIOPSY:  -- CLASSIC FOLLICULAR LYMPHOMA (WHO 2022).  SEE NOTE.   -- FOLLICULAR LYMPHOMA, GRADE 1-2 (ICC 2022).    He is feeling very well   Reports mild reaction with Rituxan, mild sore throat which resolved with benadryl   He is active  He works outside in the summertime    Lives with his wife   Eating and drinking well  Denies weight loss   Denies fever or night sweats   Denies abdominal pain, nausea or vomiting   Some PATEL    Interval history - 3/27/24     He feels well   Tolerated Rituximab well   Energy is stable and able to dima out ADLs  No fever or infections   Eating and drinking well   He modified his diet   No abdominal pain   He denies any nausea or vomiting   No changes in his BM, no constipation or diarrhea   Swallowing normal   No chest pain or SOB, no cough   No additional new complaints    Past medical history     HL, HTN, CAD, SVT/PVCs, BPH, vit D deficiency, anxiety     Social history   Has one son   He worked in making airplanes   No smoking   Used to drink 1/5 a day, not recently     Family history   Father had prostate cancer     Review of Systems - Oncology   General: no change in appetite, no chills or fever  HEENT: no hearing loss, no mouth sores, no sore throat  EYES: no eyes problems   Respiratory: no cough, no shortness of breath   CV: no chest pain, no palpitations   GI: no abdominal pain, no diarrhea, no nausea or vomiting  : no difficulty urination, no dysuria or  frequency   MSUC: no arthralgias, no back pan   Skin: no itching   Neurological: no dizziness, no headaches   Phychiatric: no anxiety or depression   Hematologic: no bruising, no adenopathy     Objective   BSA: 2.3 meters squared  /79 (BP Location: Left arm)   Pulse 83   Temp 36.4 °C (97.5 °F)   Resp 16   Wt 110 kg (241 lb 10 oz)   SpO2 94%   BMI 36.41 kg/m²     Wt Readings from Last 3 Encounters:   03/27/24 110 kg (241 lb 10 oz)   02/21/24 109 kg (240 lb 15.4 oz)   02/15/24 111 kg (244 lb)   ]      No family history on file.  Oncology History   Non-Hodgkin's lymphoma (CMS/HCC)   7/29/2019 Initial Diagnosis    Non-Hodgkin's lymphoma (CMS/HCC)     1/24/2024 -  Chemotherapy    RiTUXimab (Weekly), 28 Day Cycle          Angel Garibay  reports that he has never smoked. He has never used smokeless tobacco.  He  has no history on file for alcohol use.  He  has no history on file for drug use.    Physical Exam  Constitutional: alert, awake and oriented, not in acute distress   HEENT: moist mucous membranes, normal nose   Neck: supple, right parotid per facial mass/nodule still present at around the same size  EYES: PERRL, EOM intact, conjunctiva normal  CV: normal rate, regular rhythm, no murmur   Pulmonary: normal effort, no wheezing, equal air entry   Abdominal: soft, non tender, non distended, no palpable hepatosplenomegaly   : no CVA tenderness  Skin: no jaundice, rash or erythema  Neurological: AAOx3, no gross focal deficit   Psychiatric: normal mood and behavior   Lymph: right parotid/neck LAD    Performance Status:  Symptomatic; fully ambulatory    Assessment/Plan      Recurrent Follicular Lymphoma     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 s/p Rituxan x 4 weekly doses in 2018   Now with evidence of widespread recurrence although not much symptomatic   Due to multiple areas of disease particularly in the abdomen/peritoneum at risk of complications would recommend  treatment rather than surveillance at this time     We discussed re-treatment with Rituxan or Obinutuzumab vs anti-CD20 + Revlimid or Bendamustine, we discussed the pros and cons of each, he leans towards doing less and even considering not doing treatment     Recommended in this case that we do at least a repeat Rituximab course, he will think about it and let us know     1/24/2024-we discussed his options again and he expressed desire to proceed with treatment, we discussed his therapy options again including rituximab plus or minus additional agents  After our discussion the plan was to proceed with rituximab and reserve additional therapy in case of no response  Will plan for 4 weekly doses of Rituxan followed by restaging PET scan 1 month after completion at which point we will also discuss maintenance treatment    2/21/2024-status post 4 doses of weekly rituximab with good tolerance  We will follow-up restaging PET scan in around a month  If there is any concern we will consider biopsying the parotid gland as the original SUV was high to rule out any transformation  If there is response then we will discuss maintenance rituximab    3/27- PET scan after rituxan showed progressive disease and increased avidity   We will send to ENT for excisional LN biopsy r/o large cell transformation   Depending on biopsy we can determine treatment options for high vs low grade lymphoma regimen, he is also considering no treatment     RTC post bx    Velasquez Giordano MD

## 2024-03-27 NOTE — PROGRESS NOTES
Patient ID: Angel Garibay is a 84 y.o. male.  Referring Physician: Velasquez Giordano MD  [id number] [place] [address]  Primary Care Provider: Wilbur Jurado DO      Subjective    HPI    83 year old gentleman with past medical history of HL, HTN, CAD, SVT, BPH, vit D deficiency, anxiety who is following for hx of low grade follicular lymphoma     Presented to evaluation in Nov/Dec 2018 with Dr. Nils Alejandre   noted small nodule in left neck; 2 lymph nodes were seen on ultrasound, one of which was excised showing and Low grade follicular non-hodgkin lymphoma.   11/13/2018 US neck  left submandibular space there is an enlarged hypoechoic lymph node with increased vascular flow and thickened cortex measuring approximately 2.3 x 1.7 x 2.4 cm in size. The cortex of the lymph node measures approximately 1.7 in thickness. Smaller lymph node more laterally measures approximately 1.6 x 1.2 x 1.3 cm in size  Left neck node excisional bx 11/27/18: low grade FL   12/24/18: CT neck: The left neck has multiple enlarged lymph nodes extending from level 1 B inferiorly to level 4.  12/24/18 CT C/A/P: Haziness of the mesenteric fat along the distribution of the SMA, with borderline lymph nodes, the appearance similar to slightly more apparent when compared to the prior examination of 08/14/2015.    Received weekly Rituximab x 4 from 2/7/19 to 2/28/19 8/5/19 CT C/A/P: no evidence of disease, no repeat CT neck but clinical response reported in notes     Followed with surveillance     11/20/23: CT neck: the parotid gland bilaterally measuring 2.2 x 2.0 cm on the right and 2.6 x 2.0 cm on the left. There are 2 nodules within the superficial lobe of the right parotid gland measuring 1.1 x 0.9 and 1.3 x 1.2 cm.  12/12/23: PET scan: 1. Right parotid gland with two superficial hypermetabolic soft tissue nodules and additional large hypermetabolic deep right parotid medial soft tissue lesion. Hypermetabolic  soft tissue density located  medial to the left parotid gland. Additional multiple bilateral cervical hypermetabolic lymphadenopathy. Findings are compatible with relapsed lymphoma.  2. Multiple subdiaphragmatic abdominal and pelvic hyper metabolic lymphadenopathy and multiple hypermetabolic abdominal and pelvic peritoneal implants, consistent with blessing and extranodal disease involvement.  3. Left kidney with irregular contour, consider further correlation with renal ultrasound.    12/14/23: LYMPH NODE, PAROTID, CORE BIOPSY:  -- CLASSIC FOLLICULAR LYMPHOMA (WHO 2022).  SEE NOTE.   -- FOLLICULAR LYMPHOMA, GRADE 1-2 (ICC 2022).    He is feeling very well   Reports mild reaction with Rituxan, mild sore throat which resolved with benadryl   He is active  He works outside in the summertime    Lives with his wife   Eating and drinking well  Denies weight loss   Denies fever or night sweats   Denies abdominal pain, nausea or vomiting   Some PATEL    Interval history - 3/27/24     He feels well   Tolerated Rituximab well   Energy is stable and able to dima out ADLs  No fever or infections   Eating and drinking well   He modified his diet   No abdominal pain   He denies any nausea or vomiting   No changes in his BM, no constipation or diarrhea   Swallowing normal   No chest pain or SOB, no cough   No additional new complaints    Past medical history     HL, HTN, CAD, SVT/PVCs, BPH, vit D deficiency, anxiety     Social history   Has one son   He worked in making airplanes   No smoking   Used to drink 1/5 a day, not recently     Family history   Father had prostate cancer     Review of Systems - Oncology   General: no change in appetite, no chills or fever  HEENT: no hearing loss, no mouth sores, no sore throat  EYES: no eyes problems   Respiratory: no cough, no shortness of breath   CV: no chest pain, no palpitations   GI: no abdominal pain, no diarrhea, no nausea or vomiting  : no difficulty urination, no dysuria or  frequency   MSUC: no arthralgias, no back pan   Skin: no itching   Neurological: no dizziness, no headaches   Phychiatric: no anxiety or depression   Hematologic: no bruising, no adenopathy     Objective   BSA: 2.3 meters squared  /79 (BP Location: Left arm)   Pulse 83   Temp 36.4 °C (97.5 °F)   Resp 16   Wt 110 kg (241 lb 10 oz)   SpO2 94%   BMI 36.41 kg/m²     Wt Readings from Last 3 Encounters:   03/27/24 110 kg (241 lb 10 oz)   02/21/24 109 kg (240 lb 15.4 oz)   02/15/24 111 kg (244 lb)   ]      No family history on file.  Oncology History   Non-Hodgkin's lymphoma (CMS/HCC)   7/29/2019 Initial Diagnosis    Non-Hodgkin's lymphoma (CMS/HCC)     1/24/2024 -  Chemotherapy    RiTUXimab (Weekly), 28 Day Cycle          Angel Garibay  reports that he has never smoked. He has never used smokeless tobacco.  He  has no history on file for alcohol use.  He  has no history on file for drug use.    Physical Exam  Constitutional: alert, awake and oriented, not in acute distress   HEENT: moist mucous membranes, normal nose   Neck: supple, right parotid per facial mass/nodule still present at around the same size  EYES: PERRL, EOM intact, conjunctiva normal  CV: normal rate, regular rhythm, no murmur   Pulmonary: normal effort, no wheezing, equal air entry   Abdominal: soft, non tender, non distended, no palpable hepatosplenomegaly   : no CVA tenderness  Skin: no jaundice, rash or erythema  Neurological: AAOx3, no gross focal deficit   Psychiatric: normal mood and behavior   Lymph: right parotid/neck LAD    Performance Status:  Symptomatic; fully ambulatory    Assessment/Plan      Recurrent Follicular Lymphoma     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 s/p Rituxan x 4 weekly doses in 2018   Now with evidence of widespread recurrence although not much symptomatic   Due to multiple areas of disease particularly in the abdomen/peritoneum at risk of complications would recommend  treatment rather than surveillance at this time     We discussed re-treatment with Rituxan or Obinutuzumab vs anti-CD20 + Revlimid or Bendamustine, we discussed the pros and cons of each, he leans towards doing less and even considering not doing treatment     Recommended in this case that we do at least a repeat Rituximab course, he will think about it and let us know     1/24/2024-we discussed his options again and he expressed desire to proceed with treatment, we discussed his therapy options again including rituximab plus or minus additional agents  After our discussion the plan was to proceed with rituximab and reserve additional therapy in case of no response  Will plan for 4 weekly doses of Rituxan followed by restaging PET scan 1 month after completion at which point we will also discuss maintenance treatment    2/21/2024-status post 4 doses of weekly rituximab with good tolerance  We will follow-up restaging PET scan in around a month  If there is any concern we will consider biopsying the parotid gland as the original SUV was high to rule out any transformation  If there is response then we will discuss maintenance rituximab    3/27- PET scan after rituxan showed progressive disease and increased avidity   We will send to ENT for excisional LN biopsy r/o large cell transformation   Depending on biopsy we can determine treatment options for high vs low grade lymphoma regimen, he is also considering no treatment     RTC post bx    Velasquez Giordano MD

## 2024-03-29 NOTE — PROGRESS NOTES
Chief Complaint   Patient presents with    crvical lymphadenopathy     HPI  Angel Garibay is a 84 y.o. male referred to me today by Velasquez Giordano MD for need for excisional LN biopsy to  r/o large cell transformation lymphoma.   He has a hx of low grade follicular lymphoma. He was previously treated with rituxan.  His most recent PET scan 3/25/24 showed progressive disease and increased FDG avidity. He had marked residual hypermetabolic activity throughout the left cervical chain, left supraclavicular region, bilateral retroperitoneum, pelvis, and superficial soft tissues, with intense metabolic activity.    PMH  Past Medical History:   Diagnosis Date    Personal history of non-Hodgkin lymphomas 10/07/2021    History of lymphoma      PSH  Past Surgical History:   Procedure Laterality Date    OTHER SURGICAL HISTORY  10/07/2021    Coronary artery stent placement    OTHER SURGICAL HISTORY  10/07/2021    Cardiac catheterization with stent placement    OTHER SURGICAL HISTORY  10/07/2021    Cholecystectomy    US GUIDED BIOPSY LYMPH NODE SUPERFICIAL  12/14/2023    US GUIDED BIOPSY LYMPH NODE SUPERFICIAL 12/14/2023 [facility] US     ROS  Review of Systems   Constitutional:  Negative for appetite change, chills, fatigue, fever and unexpected weight change.   HENT:  Negative for dental problem, drooling, ear pain, facial swelling, hearing loss, mouth sores, sore throat, tinnitus, trouble swallowing and voice change.    Respiratory:  Negative for cough, shortness of breath and stridor.    Gastrointestinal:  Negative for nausea and vomiting.   Musculoskeletal:  Negative for neck pain.   Hematological:  Negative for adenopathy.   All other systems reviewed and are negative.     PE  ENT Physical Exam  Constitutional  Appearance: patient appears well-developed and well-groomed, patient is cooperative;  Communication/Voice: communication appropriate for developmental age;  Constitutional comments: Stated age  Head and  Face  Appearance: head appears normal and face appears normal;  Salivary: Salivary comments: +right parotid nodule which is firm c/w known parotid nodule/mass  Ear  Auricles: right auricle normal; left auricle normal;  Ear Canals: right ear canal normal; left ear canal normal;  Tympanic Membranes: right tympanic membrane normal; left tympanic membrane normal;  Nose  External Nose: nares patent bilaterally; external nose normal;  Internal Nose: nasal mucosa normal;  Oral Cavity/Oropharynx  Lips: normal;  Gums: gingiva normal;  Tongue: normal;  Oral mucosa: normal;  Neck  Neck: neck normal; normal trachea;  Thyroid: thyroid normal;  Neck comments: +right parotid/level 2A  Respiratory  Inspection: breathing unlabored;  Cardiovascular  Inspection: extremities are warm and well perfused;  Lymphatic  Lymphatic comments: Difficult but just able to palpate level 1B LN  Neurovestibular  Mental Status: alert and oriented;  Psychiatric: mood normal; affect is appropriate;  Cranial Nerves: cranial nerves intact;       Procedures     ASSESSMENT AND PLAN  Problem List Items Addressed This Visit       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 (Multi)    Current Assessment & Plan     Concern for progressive lymphoma versus lymphoma transformation   Recommend excision lymph node biopsy  Risks, benefits and alternatives were discussed at length regarding the above procedure(s).  The patient expressed verbal understanding of these and elected to sign a surgical consent today.  Follow up for surgery scheduling in the near future.          Relevant Orders    Case Request Operating Room: Biopsy Lymph Node Head/Neck (Completed)    Cervical lymphadenopathy    Current Assessment & Plan     Reviewed PET scan  Recommend excisional LN biopsy - discussed that I don't recommend parotid LN biopsy due to risk to his facial nerve so I would recommend excisional LN biopsy from the right level IB LN since this would not place FN  at risk but allow us to obtain a diagnosis.         Relevant Orders    Case Request Operating Room: Biopsy Lymph Node Head/Neck (Completed)    Request for Pre-Admission Testing Visit        Amalia Hay MD    Head & Neck Surgical Oncology & Reconstruction  Department of Otolaryngology - Head and Neck Surgery     By signing my name below, I, Jenaro Yanesibe, attest that this documentation has been prepared under the direction and in the presence of Dr. Amalia Hay MD.     All medical record entries made by the Scribe were at my direction and personally dictated by me, Dr. Amalia Hay. I have reviewed the chart and agree that the record accurately reflects my personal performance of the history, physical exam, discussion and plan.

## 2024-04-10 ENCOUNTER — OFFICE VISIT (OUTPATIENT)
Dept: OTOLARYNGOLOGY | Facility: HOSPITAL | Age: 84
End: 2024-04-10
Payer: MEDICARE

## 2024-04-10 VITALS — HEIGHT: 70 IN | WEIGHT: 241.8 LBS | BODY MASS INDEX: 34.62 KG/M2

## 2024-04-10 DIAGNOSIS — R59.0 CERVICAL LYMPHADENOPATHY: ICD-10-CM

## 2024-04-10 DIAGNOSIS — C82.18 GRADE 2 FOLLICULAR LYMPHOMA OF LYMPH NODES OF MULTIPLE REGIONS (MULTI): ICD-10-CM

## 2024-04-10 PROCEDURE — 1160F RVW MEDS BY RX/DR IN RCRD: CPT | Performed by: OTOLARYNGOLOGY

## 2024-04-10 PROCEDURE — 99214 OFFICE O/P EST MOD 30 MIN: CPT | Performed by: OTOLARYNGOLOGY

## 2024-04-10 PROCEDURE — 99204 OFFICE O/P NEW MOD 45 MIN: CPT | Performed by: OTOLARYNGOLOGY

## 2024-04-10 PROCEDURE — 1159F MED LIST DOCD IN RCRD: CPT | Performed by: OTOLARYNGOLOGY

## 2024-04-10 PROCEDURE — 1036F TOBACCO NON-USER: CPT | Performed by: OTOLARYNGOLOGY

## 2024-04-10 ASSESSMENT — ENCOUNTER SYMPTOMS
SHORTNESS OF BREATH: 0
ADENOPATHY: 0
TROUBLE SWALLOWING: 0
FACIAL SWELLING: 0
NAUSEA: 0
STRIDOR: 0
SORE THROAT: 0
NECK PAIN: 0
FEVER: 0
CHILLS: 0
UNEXPECTED WEIGHT CHANGE: 0
VOMITING: 0
VOICE CHANGE: 0
APPETITE CHANGE: 0
COUGH: 0
FATIGUE: 0

## 2024-04-10 ASSESSMENT — PATIENT HEALTH QUESTIONNAIRE - PHQ9
2. FEELING DOWN, DEPRESSED OR HOPELESS: NOT AT ALL
1. LITTLE INTEREST OR PLEASURE IN DOING THINGS: NOT AT ALL
SUM OF ALL RESPONSES TO PHQ9 QUESTIONS 1 & 2: 0

## 2024-04-10 NOTE — PATIENT INSTRUCTIONS
Dear Angel Garibay    Welcome to Dr. [facility],    Dr. Hay is a Head and neck oncologist and reconstructive surgeon. This means that she specializes in caring for patients with complex head and neck problems such as cancer, however,you may be seeing her for another reason.      Dr. Hay's office number is [phone] option #2.  While you may see her at a satellite office, she has a team committed to help meet your healthcare needs at [facility].  This number listed, is the most direct way to communicate with her office.      Dr. Hay's  answers the office phone from 8am-4pm Monday-Friday.  She can help you with many general questions and information.  Questions that she may not be able to answer will be directed to the appropriate staff.  You will need to leave a message and someone from the team will call you back.     Sunita and Jazmin are Dr. Hay's primary nurses. They can both be reached by calling the office as well. Sunita is in clinic on Mondays and Wednesdays with Dr. Hay. Sunita is out of the office on Tuesday's and Friday's, but Jazmin is in the office on Tuesdays and Fridays and will be covering all patient concerns. Please be mindful that all non urgent calls will be returned within 24 hours of the call.     Sometimes, other team members will also be involved in your care.  These people may include dieticians, social workers, speech therapists, medical oncologists, or radiation oncologists.  Dr. Hay will provide these referrals for you as needed.      For your connivence, Dr. Hay sees patients at several [facility] locations. These locations are [facility], [facility], and [facility] the [facility].  While we try to make your appointment as convenient as possible, occasionally a visit to another location may be necessary to provide you with the best care.      We look  forward to working with you to meet your healthcare goals.    Dr. Hay evaluated you today.    Your care plan is outlined below:  -- Dr. Hay recommended that we schedule surgery. Dr. Hay's nurse, Sunita, provided you with additional instructions for your surgery. More detailed information will be sent to you via [employer] or your email. Please be sure you receive this information and review it thoroughly.   -- Follow up with Dr. Hay 2-3 weeks after surgery. This appointment was scheduled at the end of your visit today.  If you need to reschedule, please call the office at [phone].  Please keep in mind that last minute cancellations will often result in delayed follow-up appointments and this is not advised after surgery.  -- your pathology results maybe available before your post op appointment. You can call [phone] 10 days after your surgery to get your results.   --Family Medical Leave paperwork is available through your human resources department.  Please call your human resources department to obtain the paperwork.  You must allow up to two weeks for paperwork to be completed.  You will need to fill out a portion of the paperwork before faxing it to our office for completion. If your part of the the paperwork is not completed, we will not be able to complete our portion. Please keep in mind that this will delay the family medical leave process. In addition, indicate what kind of leave you are requesting (intermittent or continuous) and how long you are requesting the leave extend.  Please fax the paperwork to [phone].

## 2024-04-12 ENCOUNTER — TELEPHONE (OUTPATIENT)
Dept: OTOLARYNGOLOGY | Facility: HOSPITAL | Age: 84
End: 2024-04-12
Payer: MEDICARE

## 2024-04-16 ENCOUNTER — ANESTHESIA EVENT (OUTPATIENT)
Dept: OPERATING ROOM | Facility: HOSPITAL | Age: 84
End: 2024-04-16
Payer: MEDICARE

## 2024-04-16 ENCOUNTER — HOSPITAL ENCOUNTER (OUTPATIENT)
Facility: HOSPITAL | Age: 84
Setting detail: OUTPATIENT SURGERY
Discharge: HOME | End: 2024-04-16
Attending: OTOLARYNGOLOGY | Admitting: OTOLARYNGOLOGY
Payer: MEDICARE

## 2024-04-16 ENCOUNTER — ANESTHESIA (OUTPATIENT)
Dept: OPERATING ROOM | Facility: HOSPITAL | Age: 84
End: 2024-04-16
Payer: MEDICARE

## 2024-04-16 VITALS
SYSTOLIC BLOOD PRESSURE: 146 MMHG | DIASTOLIC BLOOD PRESSURE: 84 MMHG | HEART RATE: 106 BPM | OXYGEN SATURATION: 97 % | WEIGHT: 239.2 LBS | HEIGHT: 70 IN | TEMPERATURE: 97.5 F | RESPIRATION RATE: 16 BRPM | BODY MASS INDEX: 34.24 KG/M2

## 2024-04-16 DIAGNOSIS — R59.0 CERVICAL LYMPHADENOPATHY: ICD-10-CM

## 2024-04-16 DIAGNOSIS — G89.18 ACUTE POSTOPERATIVE PAIN: Primary | ICD-10-CM

## 2024-04-16 DIAGNOSIS — C82.18 GRADE 2 FOLLICULAR LYMPHOMA OF LYMPH NODES OF MULTIPLE REGIONS (MULTI): ICD-10-CM

## 2024-04-16 LAB
ABO GROUP (TYPE) IN BLOOD: NORMAL
ANTIBODY SCREEN: NORMAL
RH FACTOR (ANTIGEN D): NORMAL

## 2024-04-16 PROCEDURE — 7100000002 HC RECOVERY ROOM TIME - EACH INCREMENTAL 1 MINUTE: Performed by: OTOLARYNGOLOGY

## 2024-04-16 PROCEDURE — 3700000001 HC GENERAL ANESTHESIA TIME - INITIAL BASE CHARGE: Performed by: OTOLARYNGOLOGY

## 2024-04-16 PROCEDURE — A38510 PR BX/REMV,LYMPH NODE,DEEP CERV: Performed by: STUDENT IN AN ORGANIZED HEALTH CARE EDUCATION/TRAINING PROGRAM

## 2024-04-16 PROCEDURE — 38510 BIOPSY/REMOVAL LYMPH NODES: CPT | Performed by: OTOLARYNGOLOGY

## 2024-04-16 PROCEDURE — 3600000002 HC OR TIME - INITIAL BASE CHARGE - PROCEDURE LEVEL TWO: Performed by: OTOLARYNGOLOGY

## 2024-04-16 PROCEDURE — 88305 TISSUE EXAM BY PATHOLOGIST: CPT | Mod: TC,59,SUR | Performed by: OTOLARYNGOLOGY

## 2024-04-16 PROCEDURE — 2500000004 HC RX 250 GENERAL PHARMACY W/ HCPCS (ALT 636 FOR OP/ED): Performed by: NURSE ANESTHETIST, CERTIFIED REGISTERED

## 2024-04-16 PROCEDURE — 2720000007 HC OR 272 NO HCPCS: Performed by: OTOLARYNGOLOGY

## 2024-04-16 PROCEDURE — 3600000007 HC OR TIME - EACH INCREMENTAL 1 MINUTE - PROCEDURE LEVEL TWO: Performed by: OTOLARYNGOLOGY

## 2024-04-16 PROCEDURE — 2500000001 HC RX 250 WO HCPCS SELF ADMINISTERED DRUGS (ALT 637 FOR MEDICARE OP): Performed by: OTOLARYNGOLOGY

## 2024-04-16 PROCEDURE — 88331 PATH CONSLTJ SURG 1 BLK 1SPC: CPT | Mod: TC,91,SUR | Performed by: STUDENT IN AN ORGANIZED HEALTH CARE EDUCATION/TRAINING PROGRAM

## 2024-04-16 PROCEDURE — 2500000005 HC RX 250 GENERAL PHARMACY W/O HCPCS: Performed by: OTOLARYNGOLOGY

## 2024-04-16 PROCEDURE — 2500000001 HC RX 250 WO HCPCS SELF ADMINISTERED DRUGS (ALT 637 FOR MEDICARE OP): Performed by: STUDENT IN AN ORGANIZED HEALTH CARE EDUCATION/TRAINING PROGRAM

## 2024-04-16 PROCEDURE — 7100000010 HC PHASE TWO TIME - EACH INCREMENTAL 1 MINUTE: Performed by: OTOLARYNGOLOGY

## 2024-04-16 PROCEDURE — 88305 TISSUE EXAM BY PATHOLOGIST: CPT | Performed by: PATHOLOGY

## 2024-04-16 PROCEDURE — 99100 ANES PT EXTEME AGE<1 YR&>70: CPT | Performed by: STUDENT IN AN ORGANIZED HEALTH CARE EDUCATION/TRAINING PROGRAM

## 2024-04-16 PROCEDURE — 2500000004 HC RX 250 GENERAL PHARMACY W/ HCPCS (ALT 636 FOR OP/ED): Performed by: OTOLARYNGOLOGY

## 2024-04-16 PROCEDURE — 2500000005 HC RX 250 GENERAL PHARMACY W/O HCPCS: Performed by: NURSE ANESTHETIST, CERTIFIED REGISTERED

## 2024-04-16 PROCEDURE — 7100000009 HC PHASE TWO TIME - INITIAL BASE CHARGE: Performed by: OTOLARYNGOLOGY

## 2024-04-16 PROCEDURE — 88189 FLOWCYTOMETRY/READ 16 & >: CPT | Performed by: PATHOLOGY

## 2024-04-16 PROCEDURE — 3700000002 HC GENERAL ANESTHESIA TIME - EACH INCREMENTAL 1 MINUTE: Performed by: OTOLARYNGOLOGY

## 2024-04-16 PROCEDURE — 88341 IMHCHEM/IMCYTCHM EA ADD ANTB: CPT | Performed by: PATHOLOGY

## 2024-04-16 PROCEDURE — A4217 STERILE WATER/SALINE, 500 ML: HCPCS | Performed by: OTOLARYNGOLOGY

## 2024-04-16 PROCEDURE — 86900 BLOOD TYPING SEROLOGIC ABO: CPT | Mod: 91 | Performed by: STUDENT IN AN ORGANIZED HEALTH CARE EDUCATION/TRAINING PROGRAM

## 2024-04-16 PROCEDURE — 7100000001 HC RECOVERY ROOM TIME - INITIAL BASE CHARGE: Performed by: OTOLARYNGOLOGY

## 2024-04-16 PROCEDURE — A38510 PR BX/REMV,LYMPH NODE,DEEP CERV: Performed by: NURSE ANESTHETIST, CERTIFIED REGISTERED

## 2024-04-16 PROCEDURE — 88185 FLOWCYTOMETRY/TC ADD-ON: CPT | Mod: TC | Performed by: OTOLARYNGOLOGY

## 2024-04-16 PROCEDURE — 88342 IMHCHEM/IMCYTCHM 1ST ANTB: CPT | Performed by: PATHOLOGY

## 2024-04-16 PROCEDURE — 36415 COLL VENOUS BLD VENIPUNCTURE: CPT | Performed by: STUDENT IN AN ORGANIZED HEALTH CARE EDUCATION/TRAINING PROGRAM

## 2024-04-16 RX ORDER — ONDANSETRON HYDROCHLORIDE 2 MG/ML
INJECTION, SOLUTION INTRAVENOUS AS NEEDED
Status: DISCONTINUED | OUTPATIENT
Start: 2024-04-16 | End: 2024-04-16

## 2024-04-16 RX ORDER — LIDOCAINE HCL/PF 100 MG/5ML
SYRINGE (ML) INTRAVENOUS AS NEEDED
Status: DISCONTINUED | OUTPATIENT
Start: 2024-04-16 | End: 2024-04-16

## 2024-04-16 RX ORDER — BACITRACIN 500 [USP'U]/G
OINTMENT TOPICAL AS NEEDED
Status: DISCONTINUED | OUTPATIENT
Start: 2024-04-16 | End: 2024-04-16 | Stop reason: HOSPADM

## 2024-04-16 RX ORDER — SODIUM CHLORIDE, SODIUM LACTATE, POTASSIUM CHLORIDE, CALCIUM CHLORIDE 600; 310; 30; 20 MG/100ML; MG/100ML; MG/100ML; MG/100ML
100 INJECTION, SOLUTION INTRAVENOUS CONTINUOUS
Status: DISCONTINUED | OUTPATIENT
Start: 2024-04-16 | End: 2024-04-16 | Stop reason: HOSPADM

## 2024-04-16 RX ORDER — ALBUTEROL SULFATE 0.83 MG/ML
2.5 SOLUTION RESPIRATORY (INHALATION) ONCE AS NEEDED
Status: DISCONTINUED | OUTPATIENT
Start: 2024-04-16 | End: 2024-04-16 | Stop reason: HOSPADM

## 2024-04-16 RX ORDER — HYDROMORPHONE HYDROCHLORIDE 1 MG/ML
0.5 INJECTION, SOLUTION INTRAMUSCULAR; INTRAVENOUS; SUBCUTANEOUS EVERY 5 MIN PRN
Status: DISCONTINUED | OUTPATIENT
Start: 2024-04-16 | End: 2024-04-16 | Stop reason: HOSPADM

## 2024-04-16 RX ORDER — HYDROMORPHONE HYDROCHLORIDE 1 MG/ML
0.2 INJECTION, SOLUTION INTRAMUSCULAR; INTRAVENOUS; SUBCUTANEOUS EVERY 5 MIN PRN
Status: DISCONTINUED | OUTPATIENT
Start: 2024-04-16 | End: 2024-04-16 | Stop reason: HOSPADM

## 2024-04-16 RX ORDER — LIDOCAINE HYDROCHLORIDE AND EPINEPHRINE 10; 10 MG/ML; UG/ML
INJECTION, SOLUTION INFILTRATION; PERINEURAL AS NEEDED
Status: DISCONTINUED | OUTPATIENT
Start: 2024-04-16 | End: 2024-04-16 | Stop reason: HOSPADM

## 2024-04-16 RX ORDER — ONDANSETRON 4 MG/1
4 TABLET, FILM COATED ORAL EVERY 8 HOURS PRN
Qty: 20 TABLET | Refills: 0 | Status: SHIPPED | OUTPATIENT
Start: 2024-04-16

## 2024-04-16 RX ORDER — METOPROLOL TARTRATE 1 MG/ML
INJECTION, SOLUTION INTRAVENOUS AS NEEDED
Status: DISCONTINUED | OUTPATIENT
Start: 2024-04-16 | End: 2024-04-16

## 2024-04-16 RX ORDER — FENTANYL CITRATE 50 UG/ML
INJECTION, SOLUTION INTRAMUSCULAR; INTRAVENOUS AS NEEDED
Status: DISCONTINUED | OUTPATIENT
Start: 2024-04-16 | End: 2024-04-16

## 2024-04-16 RX ORDER — OXYCODONE HYDROCHLORIDE 5 MG/1
5 TABLET ORAL EVERY 4 HOURS PRN
Status: DISCONTINUED | OUTPATIENT
Start: 2024-04-16 | End: 2024-04-16 | Stop reason: HOSPADM

## 2024-04-16 RX ORDER — SODIUM CHLORIDE, SODIUM LACTATE, POTASSIUM CHLORIDE, CALCIUM CHLORIDE 600; 310; 30; 20 MG/100ML; MG/100ML; MG/100ML; MG/100ML
INJECTION, SOLUTION INTRAVENOUS CONTINUOUS PRN
Status: DISCONTINUED | OUTPATIENT
Start: 2024-04-16 | End: 2024-04-16

## 2024-04-16 RX ORDER — PROPOFOL 10 MG/ML
INJECTION, EMULSION INTRAVENOUS AS NEEDED
Status: DISCONTINUED | OUTPATIENT
Start: 2024-04-16 | End: 2024-04-16

## 2024-04-16 RX ORDER — SODIUM CHLORIDE 0.9 G/100ML
IRRIGANT IRRIGATION AS NEEDED
Status: DISCONTINUED | OUTPATIENT
Start: 2024-04-16 | End: 2024-04-16 | Stop reason: HOSPADM

## 2024-04-16 RX ORDER — HYDRALAZINE HYDROCHLORIDE 20 MG/ML
5 INJECTION INTRAMUSCULAR; INTRAVENOUS EVERY 30 MIN PRN
Status: DISCONTINUED | OUTPATIENT
Start: 2024-04-16 | End: 2024-04-16 | Stop reason: HOSPADM

## 2024-04-16 RX ORDER — CEFAZOLIN 1 G/1
INJECTION, POWDER, FOR SOLUTION INTRAVENOUS AS NEEDED
Status: DISCONTINUED | OUTPATIENT
Start: 2024-04-16 | End: 2024-04-16

## 2024-04-16 RX ORDER — LABETALOL HYDROCHLORIDE 5 MG/ML
5 INJECTION, SOLUTION INTRAVENOUS ONCE AS NEEDED
Status: DISCONTINUED | OUTPATIENT
Start: 2024-04-16 | End: 2024-04-16 | Stop reason: HOSPADM

## 2024-04-16 RX ORDER — TRAMADOL HYDROCHLORIDE 50 MG/1
50 TABLET ORAL EVERY 8 HOURS PRN
Qty: 10 TABLET | Refills: 0 | Status: SHIPPED | OUTPATIENT
Start: 2024-04-16

## 2024-04-16 RX ORDER — LIDOCAINE HYDROCHLORIDE 10 MG/ML
0.1 INJECTION INFILTRATION; PERINEURAL ONCE
Status: DISCONTINUED | OUTPATIENT
Start: 2024-04-16 | End: 2024-04-16 | Stop reason: HOSPADM

## 2024-04-16 RX ORDER — ONDANSETRON HYDROCHLORIDE 2 MG/ML
4 INJECTION, SOLUTION INTRAVENOUS ONCE AS NEEDED
Status: DISCONTINUED | OUTPATIENT
Start: 2024-04-16 | End: 2024-04-16 | Stop reason: HOSPADM

## 2024-04-16 RX ADMIN — FENTANYL CITRATE 25 MCG: 50 INJECTION, SOLUTION INTRAMUSCULAR; INTRAVENOUS at 11:32

## 2024-04-16 RX ADMIN — METOPROLOL TARTRATE 2 MG: 5 INJECTION, SOLUTION INTRAVENOUS at 13:20

## 2024-04-16 RX ADMIN — FENTANYL CITRATE 25 MCG: 50 INJECTION, SOLUTION INTRAMUSCULAR; INTRAVENOUS at 12:50

## 2024-04-16 RX ADMIN — SODIUM CHLORIDE, POTASSIUM CHLORIDE, SODIUM LACTATE AND CALCIUM CHLORIDE: 600; 310; 30; 20 INJECTION, SOLUTION INTRAVENOUS at 10:52

## 2024-04-16 RX ADMIN — FENTANYL CITRATE 25 MCG: 50 INJECTION, SOLUTION INTRAMUSCULAR; INTRAVENOUS at 12:14

## 2024-04-16 RX ADMIN — DEXAMETHASONE SODIUM PHOSPHATE 4 MG: 4 INJECTION INTRA-ARTICULAR; INTRALESIONAL; INTRAMUSCULAR; INTRAVENOUS; SOFT TISSUE at 11:50

## 2024-04-16 RX ADMIN — FENTANYL CITRATE 25 MCG: 50 INJECTION, SOLUTION INTRAMUSCULAR; INTRAVENOUS at 12:30

## 2024-04-16 RX ADMIN — METOPROLOL TARTRATE 2 MG: 5 INJECTION, SOLUTION INTRAVENOUS at 12:41

## 2024-04-16 RX ADMIN — OXYCODONE HYDROCHLORIDE 5 MG: 5 TABLET ORAL at 15:03

## 2024-04-16 RX ADMIN — FENTANYL CITRATE 50 MCG: 50 INJECTION, SOLUTION INTRAMUSCULAR; INTRAVENOUS at 13:17

## 2024-04-16 RX ADMIN — FENTANYL CITRATE 25 MCG: 50 INJECTION, SOLUTION INTRAMUSCULAR; INTRAVENOUS at 13:03

## 2024-04-16 RX ADMIN — PROPOFOL 200 MG: 10 INJECTION, EMULSION INTRAVENOUS at 11:42

## 2024-04-16 RX ADMIN — FENTANYL CITRATE 25 MCG: 50 INJECTION, SOLUTION INTRAMUSCULAR; INTRAVENOUS at 11:39

## 2024-04-16 RX ADMIN — CEFAZOLIN 2 G: 1 INJECTION, POWDER, FOR SOLUTION INTRAMUSCULAR; INTRAVENOUS at 11:52

## 2024-04-16 RX ADMIN — ONDANSETRON 4 MG: 2 INJECTION INTRAMUSCULAR; INTRAVENOUS at 13:23

## 2024-04-16 RX ADMIN — LIDOCAINE HYDROCHLORIDE 60 MG: 20 INJECTION INTRAVENOUS at 11:42

## 2024-04-16 SDOH — HEALTH STABILITY: MENTAL HEALTH: CURRENT SMOKER: 0

## 2024-04-16 ASSESSMENT — PAIN - FUNCTIONAL ASSESSMENT
PAIN_FUNCTIONAL_ASSESSMENT: 0-10
PAIN_FUNCTIONAL_ASSESSMENT: 0-10

## 2024-04-16 ASSESSMENT — COLUMBIA-SUICIDE SEVERITY RATING SCALE - C-SSRS
1. IN THE PAST MONTH, HAVE YOU WISHED YOU WERE DEAD OR WISHED YOU COULD GO TO SLEEP AND NOT WAKE UP?: NO
6. HAVE YOU EVER DONE ANYTHING, STARTED TO DO ANYTHING, OR PREPARED TO DO ANYTHING TO END YOUR LIFE?: NO
2. HAVE YOU ACTUALLY HAD ANY THOUGHTS OF KILLING YOURSELF?: NO

## 2024-04-16 ASSESSMENT — PAIN SCALES - GENERAL
PAINLEVEL_OUTOF10: 0 - NO PAIN
PAINLEVEL_OUTOF10: 6

## 2024-04-16 NOTE — OP NOTE
Biopsy Lymph Node Head/Neck (R) Operative Note     Date: 2024  OR Location: 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 (Multi) [C82.18]     * Cervical lymphadenopathy [R59.0]     Procedures  Biopsy Lymph Node Head/Neck  [address] - [place] BX/EXC LYMPH NODE OPEN DEEP CERVICAL NODE      Surgeons      * Amalia Hay - Primary    Resident/Fellow/Other Assistant:  Surgeons and Role:     * Cyndie Persley MD - Resident - Assisting     * Jose Angel Castro MD - Resident - Assisting    Procedure Summary  Anesthesia: General  ASA: III  Anesthesia Staff: Anesthesiologist: Cristofer Adler DO  CRNA: MAKI Collier-CRNA; MAKI Bean-CRNA  Estimated Blood Loss: 7 mL  Intra-op Medications: Administrations occurring from 1045 to 1205 on 24:  * No intraprocedure medications in log *           Anesthesia Record               Intraprocedure I/O Totals          Output    Est. Blood Loss 10 mL    Total Output 10 mL          Specimen:   ID Type Source Tests Collected by Time   1 : Right level 1B lymph node biopsy. LYMPHOMA PROTOCOL. Tissue LYMPH NODE BIOPSY SURGICAL PATHOLOGY EXAM Amalia Hay MD 2024 1214   2 : Right level 1B lymph node biopsy Tissue LYMPH NODE BIOPSY FLOW CYTOMETRY TEST Amalia Hay MD 2024 1216   3 : lymph node? NOT LYMPHOMA PROTOCOL Tissue LYMPH NODE BIOPSY SURGICAL PATHOLOGY EXAM Amalia Hay MD 2024 1307        Staff:   Circulator: Latanya Campbell, PETEY; Rachael Howard RN  Relief Circulator: Donna Agustin RN  Relief Scrub: Donna Agustin RN  Scrub Person: Karen Moya; Jeff Stanley RN      Drains and/or Catheters: * None in log *    Tourniquet Times:  N/A        Implants:  N/A    Findings: pathologic lymphadenopathy within level Ib    Indications: Angel Garibay is an 84 y.o. 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     The patient was seen in the preoperative area. The risks, benefits, complications, treatment options, non-operative alternatives, expected recovery and outcomes were discussed with the patient. The possibilities of reaction to medication, pulmonary aspiration, injury to surrounding structures, bleeding, recurrent infection, the need for additional procedures, failure to diagnose a condition, and creating a complication requiring transfusion or operation were discussed with the patient. The patient concurred with the proposed plan, giving informed consent.  The site of surgery was properly noted/marked if necessary per policy.     Procedure Details:   Patient was seen and evaluated in the pre-operative area. Informed consent was obtained as described above. The patient was then taken to the operating room by the anesthesia team. General anesthesia was induced and patient was orotracheally intubated without issue. Patient was then turned 90 degrees towards the ENT team. Time-out was performed by Dr. Hay.    An approximate 5 cm incision was designed in a neck crease 2 finger breadths below the mandible on the right side extending from the anterior border of the sternocleidomastoid muscle and parallel to the mandible. 6 mLs of 1% lidocaine with 1:100,00 epinephrine was injected in the incision line. The area was then prepped with Betadine and draped in sterile fashion. The corner lip was exposed on the rightside during the resection. The skin incision was carried down through the platysma using a 15 blade and then the bovie. An inferior subplatysmal flap was raised. At this depth, the fascia over the inferior edge of the submandibular gland  was bluntly dissected using a tonsil and bovie. Once the inferior edge of the gland was exposed, we then carefully dissected the gland in an inferior to superior fashion by hugging the fascia just over the gland. This ensured protection of the marginal mandibular nerve which was not exposed during the dissection.    The facial artery and vein were identified and preserved during the dissection. As we moved superiorly the gland was dissected off the mylohyoid muscle. The submandibular gland was retracted inferiorly exposing the level Ib lymph nodes, which were pathologic appearing. A node within the superficial subcutaneous fat was dissected and sent to pathology in order to determine if it was blessing tissue. This returned as lymphoid tissue. A more discernible blessing conglomerate was identified more deep and lateral to the submandibular gland and dissected from surrounding tissue and removed as a specimen. The specimen was then delivered from the operative field and sent to pathology.     The surgical site was copiously irrigated with sterile saline and hemostasis obtained with bipolar cautery. Fibrillar was placed into the wound bed. The incision was closed using 3-0 Vicryl suture to reapproximate the platysma and deep dermis followed by a running 5-0 fast for the skin. The patient was then turned over to the care of the anesthesiology team, extubated uneventfully in the operating room and transferred to the post-anesthesia care unit.    Dr. Hay was present for the critical portions of the procedure.    Complications:  None; patient tolerated the procedure well.    Disposition: PACU - hemodynamically stable.  Condition: stable     Attending Attestation: I was present during all critical and key portions of the procedure(s) and immediately available to furnish services the entire duration.  See resident note for details.       Amalia Hay  Phone Number: [phone]

## 2024-04-16 NOTE — ANESTHESIA PREPROCEDURE EVALUATION
Patient: Angel Garibay    Procedure Information       Date/Time: 04/16/24 1045    Procedure: Biopsy Lymph Node Head/Neck (Right)    Location: [facility] OR 04 / [facility] OR    Surgeons: Amalia Hay MD            Relevant Problems   Anesthesia (within normal limits)      Cardiac   (+) Arrhythmia   (+) Coronary artery disease   (+) Essential hypertension   (+) Hyperlipidemia   (+) PSVT (paroxysmal supraventricular tachycardia) (CMS-HCC)   (+) Pure hypercholesterolemia      Pulmonary (within normal limits)      Neuro   (+) Anxiety   (+) Carpal tunnel syndrome of left wrist   (+) Carpal tunnel syndrome of right wrist      GI (within normal limits)      /Renal   (+) Benign prostatic hyperplasia without urinary obstruction      Liver (within normal limits)      Endocrine (within normal limits)      Hematology   (+) 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 (Multi)   (+) Non-Hodgkin's lymphoma (Multi)      Musculoskeletal   (+) Carpal tunnel syndrome of left wrist   (+) Carpal tunnel syndrome of right wrist      HEENT (within normal limits)      ID (within normal limits)      Skin (within normal limits)      GYN (within normal limits)       Clinical information reviewed:   Tobacco  Allergies  Meds   Med Hx  Surg Hx   Fam Hx  Soc Hx        NPO Detail:  NPO/Void Status  Date of Last Liquid: 04/16/24  Time of Last Liquid: 0000  Date of Last Solid: 04/16/24  Time of Last Solid: 0000         Physical Exam    Airway  Mallampati: IV  TM distance: >3 FB  Neck ROM: full     Cardiovascular    Dental - normal exam     Pulmonary    Abdominal   (+) obese             Anesthesia Plan    History of general anesthesia?: yes  History of complications of general anesthesia?: no    ASA 3     general     The patient is not a current smoker.    intravenous induction   Postoperative administration of opioids is intended.  Trial extubation is planned.  Anesthetic plan and risks discussed with  patient.  Use of blood products discussed with patient who consented to blood products.    Plan discussed with CRNA.

## 2024-04-16 NOTE — ANESTHESIA PROCEDURE NOTES
Airway  Date/Time: 4/16/2024 11:43 AM  Urgency: elective    Airway not difficult    Staffing  Performed: CRNA   Authorized by: Cristofer Adler DO    Performed by: MAKI Collier-ARCHIE  Patient location during procedure: OR    Indications and Patient Condition  Indications for airway management: anesthesia  Spontaneous Ventilation: absent  Sedation level: deep  Preoxygenated: yes  Patient position: sniffing  Mask difficulty assessment: 2 - vent by mask + OA or adjuvant +/- NMBA    Final Airway Details  Final airway type: supraglottic airway      Successful airway: classic  Size 4     Number of attempts at approach: 1  Number of other approaches attempted: 0

## 2024-04-16 NOTE — ANESTHESIA POSTPROCEDURE EVALUATION
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    Anesthesia Post Evaluation    Patient location during evaluation: PACU  Patient participation: complete - patient participated  Level of consciousness: awake  Pain management: adequate  Multimodal analgesia pain management approach  Airway patency: patent  Two or more strategies used to mitigate risk of obstructive sleep apnea  Cardiovascular status: acceptable  Respiratory status: acceptable  Hydration status: acceptable  Postoperative Nausea and Vomiting: none        No notable events documented.

## 2024-04-16 NOTE — DISCHARGE INSTRUCTIONS
Apply vaseline to incision three times daily, do not submerge in tub, and do not allow direct water pressure over incision.   Do not drive while taking narcotics. No heavy lifting (nothing greater than 10 lbs) Call if there are any concerning lesions such as voice changes, neck swelling, fevers, bleeding, persistent nausea or vomiting.

## 2024-04-16 NOTE — INTERVAL H&P NOTE
H&P reviewed.  Medications reviewed from outpatient and detailed below.     No current facility-administered medications on file prior to encounter.     Current Outpatient Medications on File Prior to Encounter   Medication Sig Dispense Refill    atorvastatin (Lipitor) 40 mg tablet atorvastatin 40 mg tablet   take 1 tablet by mouth once daily 90 tablet 2    clorazepate (Tranxene) 15 mg tablet Take 1 tablet (15 mg) by mouth 2 times a day. 180 tablet 0    flaxseed oiL oil       losartan (Cozaar) 50 mg tablet Take 1 tablet (50 mg) by mouth once daily. 90 tablet 2    vitamin D3-vitamin K2 1,250-200 mcg capsule Vitamin D   2,000 iu once daily          The patient was examined and there are no changes to the H&P.

## 2024-04-17 ASSESSMENT — PAIN SCALES - GENERAL: PAINLEVEL_OUTOF10: 6

## 2024-04-18 ENCOUNTER — OFFICE VISIT (OUTPATIENT)
Dept: HEMATOLOGY/ONCOLOGY | Facility: CLINIC | Age: 84
End: 2024-04-18
Payer: MEDICARE

## 2024-04-18 VITALS
BODY MASS INDEX: 35.25 KG/M2 | OXYGEN SATURATION: 96 % | SYSTOLIC BLOOD PRESSURE: 115 MMHG | HEART RATE: 79 BPM | RESPIRATION RATE: 18 BRPM | DIASTOLIC BLOOD PRESSURE: 70 MMHG | TEMPERATURE: 97.9 F | WEIGHT: 245.7 LBS

## 2024-04-18 DIAGNOSIS — C82.18 GRADE 2 FOLLICULAR LYMPHOMA OF LYMPH NODES OF MULTIPLE REGIONS (MULTI): ICD-10-CM

## 2024-04-18 PROCEDURE — 3078F DIAST BP <80 MM HG: CPT | Performed by: INTERNAL MEDICINE

## 2024-04-18 PROCEDURE — 99212 OFFICE O/P EST SF 10 MIN: CPT | Performed by: INTERNAL MEDICINE

## 2024-04-18 PROCEDURE — 1159F MED LIST DOCD IN RCRD: CPT | Performed by: INTERNAL MEDICINE

## 2024-04-18 PROCEDURE — 3074F SYST BP LT 130 MM HG: CPT | Performed by: INTERNAL MEDICINE

## 2024-04-18 PROCEDURE — 1160F RVW MEDS BY RX/DR IN RCRD: CPT | Performed by: INTERNAL MEDICINE

## 2024-04-18 PROCEDURE — 1125F AMNT PAIN NOTED PAIN PRSNT: CPT | Performed by: INTERNAL MEDICINE

## 2024-04-18 ASSESSMENT — PAIN SCALES - GENERAL: PAINLEVEL: 8

## 2024-04-18 ASSESSMENT — NCCN CANCER DISTRESS MANAGEMENT: NCCN PHYSICAL CONCERNS: 1

## 2024-04-18 NOTE — PATIENT INSTRUCTIONS
Today you met with your hematologist/oncologist.  Recent labs were discussed and questions answered.  Scheduling orders were placed.  While we appreciate that you verbalized understanding, if any questions arise after leaving, please do not hesitate to call the office to discuss.  [phone] Katharina Lorenz.  Dr Giordano will see you in 2 weeks. We will contact Dr. Mccurdy office to see if you need any additional follow up appointments.

## 2024-04-18 NOTE — PROGRESS NOTES
Patient ID: Angel Garibay is a 84 y.o. male.  Referring Physician: Velasquez Giordano MD  [id number] [place] [address]  Primary Care Provider: Wilbur Jurado DO      Subjective    HPI    83 year old gentleman with past medical history of HL, HTN, CAD, SVT, BPH, vit D deficiency, anxiety who is following for hx of low grade follicular lymphoma     Presented to evaluation in Nov/Dec 2018 with Dr. Nils Alejandre   noted small nodule in left neck; 2 lymph nodes were seen on ultrasound, one of which was excised showing and Low grade follicular non-hodgkin lymphoma.   11/13/2018 US neck  left submandibular space there is an enlarged hypoechoic lymph node with increased vascular flow and thickened cortex measuring approximately 2.3 x 1.7 x 2.4 cm in size. The cortex of the lymph node measures approximately 1.7 in thickness. Smaller lymph node more laterally measures approximately 1.6 x 1.2 x 1.3 cm in size  Left neck node excisional bx 11/27/18: low grade FL   12/24/18: CT neck: The left neck has multiple enlarged lymph nodes extending from level 1 B inferiorly to level 4.  12/24/18 CT C/A/P: Haziness of the mesenteric fat along the distribution of the SMA, with borderline lymph nodes, the appearance similar to slightly more apparent when compared to the prior examination of 08/14/2015.    Received weekly Rituximab x 4 from 2/7/19 to 2/28/19 8/5/19 CT C/A/P: no evidence of disease, no repeat CT neck but clinical response reported in notes     Followed with surveillance     11/20/23: CT neck: the parotid gland bilaterally measuring 2.2 x 2.0 cm on the right and 2.6 x 2.0 cm on the left. There are 2 nodules within the superficial lobe of the right parotid gland measuring 1.1 x 0.9 and 1.3 x 1.2 cm.  12/12/23: PET scan: 1. Right parotid gland with two superficial hypermetabolic soft tissue nodules and additional large hypermetabolic deep right parotid medial soft tissue lesion. Hypermetabolic  soft tissue density located  medial to the left parotid gland. Additional multiple bilateral cervical hypermetabolic lymphadenopathy. Findings are compatible with relapsed lymphoma.  2. Multiple subdiaphragmatic abdominal and pelvic hyper metabolic lymphadenopathy and multiple hypermetabolic abdominal and pelvic peritoneal implants, consistent with blessing and extranodal disease involvement.  3. Left kidney with irregular contour, consider further correlation with renal ultrasound.    12/14/23: LYMPH NODE, PAROTID, CORE BIOPSY:  -- CLASSIC FOLLICULAR LYMPHOMA (WHO 2022).  SEE NOTE.   -- FOLLICULAR LYMPHOMA, GRADE 1-2 (ICC 2022).    He is feeling very well   Reports mild reaction with Rituxan, mild sore throat which resolved with benadryl   He is active  He works outside in the summertime    Lives with his wife   Eating and drinking well  Denies weight loss   Denies fever or night sweats   Denies abdominal pain, nausea or vomiting   Some PATEL    Interval history - 4/18/24     He is s/p biopsy of cervical LN   He tolerated well,  Area of erythema but no discharge or pus   Pain is decreasing  he is starting to eat more   No fever     Energy is stable and able to dima out ADLs  No abdominal pain   He denies any nausea or vomiting   No changes in his BM, no constipation or diarrhea   No chest pain or SOB, no cough   No additional new complaints    Past medical history     HL, HTN, CAD, SVT/PVCs, BPH, vit D deficiency, anxiety     Social history   Has one son   He worked in making airplanes   No smoking   Used to drink 1/5 a day, not recently     Family history   Father had prostate cancer     Review of Systems - Oncology   General: no change in appetite, no chills or fever  HEENT: no hearing loss, no mouth sores, no sore throat  EYES: no eyes problems   Respiratory: no cough, no shortness of breath   CV: no chest pain, no palpitations   GI: no abdominal pain, no diarrhea, no nausea or vomiting  : no difficulty urination,  no dysuria or frequency   MSUC: no arthralgias, no back pan   Skin: no itching   Neurological: no dizziness, no headaches   Phychiatric: no anxiety or depression   Hematologic: no bruising, no adenopathy     Objective   BSA: 2.34 meters squared  /70 (BP Location: Left arm, Patient Position: Sitting, BP Cuff Size: Large adult)   Pulse 79   Temp 36.6 °C (97.9 °F) (Temporal)   Resp 18   Wt 111 kg (245 lb 11.2 oz)   SpO2 96%   BMI 35.25 kg/m²     Wt Readings from Last 3 Encounters:   04/18/24 111 kg (245 lb 11.2 oz)   04/16/24 109 kg (239 lb 3.2 oz)   04/10/24 110 kg (241 lb 12.8 oz)   ]      No family history on file.  Oncology History   Non-Hodgkin's lymphoma (Multi)   7/29/2019 Initial Diagnosis    Non-Hodgkin's lymphoma (CMS/HCC)     1/24/2024 -  Chemotherapy    RiTUXimab (Weekly), 28 Day Cycle          Angel SAVANNAH Garibay  reports that he has never smoked. He has never used smokeless tobacco.  He  reports no history of alcohol use.  He  reports no history of drug use.    Physical Exam  Constitutional: alert, awake and oriented, not in acute distress   HEENT: moist mucous membranes, normal nose   Neck: supple, right parotid per facial mass/nodule still present at around the same size, incision wound with surrounding erythema but no drainage   EYES: PERRL, EOM intact, conjunctiva normal  CV: normal rate, regular rhythm, no murmur   Pulmonary: normal effort, no wheezing, equal air entry   Abdominal: soft, non tender, non distended, no palpable hepatosplenomegaly   : no CVA tenderness  Skin: no jaundice, rash or erythema  Neurological: AAOx3, no gross focal deficit   Psychiatric: normal mood and behavior   Lymph: right parotid/neck LAD    Performance Status:  Symptomatic; fully ambulatory    Assessment/Plan      Recurrent Follicular Lymphoma     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 s/p Rituxan x 4 weekly doses in 2018   Now with evidence of widespread recurrence  although not much symptomatic   Due to multiple areas of disease particularly in the abdomen/peritoneum at risk of complications would recommend treatment rather than surveillance at this time     We discussed re-treatment with Rituxan or Obinutuzumab vs anti-CD20 + Revlimid or Bendamustine, we discussed the pros and cons of each, he leans towards doing less and even considering not doing treatment     Recommended in this case that we do at least a repeat Rituximab course, he will think about it and let us know     1/24/2024-we discussed his options again and he expressed desire to proceed with treatment, we discussed his therapy options again including rituximab plus or minus additional agents  After our discussion the plan was to proceed with rituximab and reserve additional therapy in case of no response  Will plan for 4 weekly doses of Rituxan followed by restaging PET scan 1 month after completion at which point we will also discuss maintenance treatment    2/21/2024-status post 4 doses of weekly rituximab with good tolerance  We will follow-up restaging PET scan in around a month  If there is any concern we will consider biopsying the parotid gland as the original SUV was high to rule out any transformation  If there is response then we will discuss maintenance rituximab    3/27- PET scan after rituxan showed progressive disease and increased avidity   We will send to ENT for excisional LN biopsy r/o large cell transformation   Depending on biopsy we can determine treatment options for high vs low grade lymphoma regimen, he is also considering no treatment       4/18- s/p biopsy, the result is pending, will ask for RTC once the result is available, meantime ask for post op ENT check up    Velasquez Giordano MD

## 2024-04-19 LAB
CELL COUNT (BLOOD): 6.18 X10*3/UL
CELL POPULATIONS: NORMAL
DIAGNOSIS: NORMAL
FLOW DIFFERENTIAL: NORMAL
FLOW TEST ORDERED: NORMAL
LAB TEST METHOD: NORMAL
NUMBER OF CELLS COLLECTED: NORMAL PER TUBE
PATH REPORT.TOTAL CANCER: NORMAL
SIGNATURE COMMENT: NORMAL
SPECIMEN VIABILITY: NORMAL

## 2024-04-21 NOTE — ASSESSMENT & PLAN NOTE
Reviewed PET scan  Recommend excisional LN biopsy - discussed that I don't recommend parotid LN biopsy due to risk to his facial nerve so I would recommend excisional LN biopsy from the right level IB LN since this would not place FN at risk but allow us to obtain a diagnosis.

## 2024-04-21 NOTE — ASSESSMENT & PLAN NOTE
Concern for progressive lymphoma versus lymphoma transformation   Recommend excision lymph node biopsy  Risks, benefits and alternatives were discussed at length regarding the above procedure(s).  The patient expressed verbal understanding of these and elected to sign a surgical consent today.  Follow up for surgery scheduling in the near future.

## 2024-04-22 ENCOUNTER — TELEPHONE (OUTPATIENT)
Dept: OTOLARYNGOLOGY | Facility: CLINIC | Age: 84
End: 2024-04-22
Payer: MEDICARE

## 2024-04-22 DIAGNOSIS — C82.18 GRADE 2 FOLLICULAR LYMPHOMA OF LYMPH NODES OF MULTIPLE REGIONS (MULTI): Primary | ICD-10-CM

## 2024-04-22 LAB
LAB AP ASR DISCLAIMER: NORMAL
LABORATORY COMMENT REPORT: NORMAL
Lab: NORMAL
PATH REPORT.FINAL DX SPEC: NORMAL
PATH REPORT.GROSS SPEC: NORMAL
PATH REPORT.RELEVANT HX SPEC: NORMAL
PATH REPORT.TOTAL CANCER: NORMAL

## 2024-04-22 NOTE — TELEPHONE ENCOUNTER
Called and reviewed pathology results with the patient.  This shows follicular lymphoma grade 1-2 which is the same as previous biopsy, no transformation.    He has follow up with his oncologist.    Sutures are absorbable and he can follow up virtually or in person as needed.  He is having some swelling but no signs of infection.      Amalia Hay MD    Head & Neck Surgical Oncology & Reconstruction  Department of Otolaryngology - Head and Neck Surgery

## 2024-05-02 ENCOUNTER — APPOINTMENT (OUTPATIENT)
Dept: HEMATOLOGY/ONCOLOGY | Facility: CLINIC | Age: 84
End: 2024-05-02
Payer: MEDICARE

## 2024-06-13 ENCOUNTER — OFFICE VISIT (OUTPATIENT)
Dept: HEMATOLOGY/ONCOLOGY | Facility: CLINIC | Age: 84
End: 2024-06-13
Payer: MEDICARE

## 2024-06-13 VITALS
SYSTOLIC BLOOD PRESSURE: 147 MMHG | HEART RATE: 90 BPM | OXYGEN SATURATION: 94 % | BODY MASS INDEX: 34.95 KG/M2 | DIASTOLIC BLOOD PRESSURE: 77 MMHG | WEIGHT: 243.61 LBS | TEMPERATURE: 97.7 F | RESPIRATION RATE: 17 BRPM

## 2024-06-13 DIAGNOSIS — C82.18 GRADE 2 FOLLICULAR LYMPHOMA OF LYMPH NODES OF MULTIPLE REGIONS (MULTI): Primary | ICD-10-CM

## 2024-06-13 LAB
ALBUMIN SERPL BCP-MCNC: 4.2 G/DL (ref 3.4–5)
ALP SERPL-CCNC: 99 U/L (ref 33–136)
ALT SERPL W P-5'-P-CCNC: 19 U/L (ref 10–52)
ANION GAP SERPL CALC-SCNC: 10 MMOL/L (ref 10–20)
AST SERPL W P-5'-P-CCNC: 22 U/L (ref 9–39)
BASOPHILS # BLD AUTO: 0.04 X10*3/UL (ref 0–0.1)
BASOPHILS NFR BLD AUTO: 0.7 %
BILIRUB SERPL-MCNC: 0.5 MG/DL (ref 0–1.2)
BUN SERPL-MCNC: 15 MG/DL (ref 6–23)
CALCIUM SERPL-MCNC: 9.4 MG/DL (ref 8.6–10.3)
CHLORIDE SERPL-SCNC: 103 MMOL/L (ref 98–107)
CO2 SERPL-SCNC: 29 MMOL/L (ref 21–32)
CREAT SERPL-MCNC: 0.92 MG/DL (ref 0.5–1.3)
EGFRCR SERPLBLD CKD-EPI 2021: 82 ML/MIN/1.73M*2
EOSINOPHIL # BLD AUTO: 0.22 X10*3/UL (ref 0–0.4)
EOSINOPHIL NFR BLD AUTO: 3.7 %
ERYTHROCYTE [DISTWIDTH] IN BLOOD BY AUTOMATED COUNT: 12.5 % (ref 11.5–14.5)
GLUCOSE SERPL-MCNC: 116 MG/DL (ref 74–99)
HCT VFR BLD AUTO: 41.6 % (ref 41–52)
HGB BLD-MCNC: 13.3 G/DL (ref 13.5–17.5)
IMM GRANULOCYTES # BLD AUTO: 0 X10*3/UL (ref 0–0.5)
IMM GRANULOCYTES NFR BLD AUTO: 0 % (ref 0–0.9)
LDH SERPL L TO P-CCNC: 172 U/L (ref 84–246)
LYMPHOCYTES # BLD AUTO: 1.17 X10*3/UL (ref 0.8–3)
LYMPHOCYTES NFR BLD AUTO: 19.8 %
MCH RBC QN AUTO: 30.9 PG (ref 26–34)
MCHC RBC AUTO-ENTMCNC: 32 G/DL (ref 32–36)
MCV RBC AUTO: 97 FL (ref 80–100)
MONOCYTES # BLD AUTO: 0.55 X10*3/UL (ref 0.05–0.8)
MONOCYTES NFR BLD AUTO: 9.3 %
NEUTROPHILS # BLD AUTO: 3.93 X10*3/UL (ref 1.6–5.5)
NEUTROPHILS NFR BLD AUTO: 66.5 %
PHOSPHATE SERPL-MCNC: 2.6 MG/DL (ref 2.5–4.9)
PLATELET # BLD AUTO: 275 X10*3/UL (ref 150–450)
POTASSIUM SERPL-SCNC: 4 MMOL/L (ref 3.5–5.3)
PROT SERPL-MCNC: 7 G/DL (ref 6.4–8.2)
RBC # BLD AUTO: 4.3 X10*6/UL (ref 4.5–5.9)
SODIUM SERPL-SCNC: 138 MMOL/L (ref 136–145)
URATE SERPL-MCNC: 5.2 MG/DL (ref 4–7.5)
WBC # BLD AUTO: 5.9 X10*3/UL (ref 4.4–11.3)

## 2024-06-13 PROCEDURE — 99214 OFFICE O/P EST MOD 30 MIN: CPT | Performed by: INTERNAL MEDICINE

## 2024-06-13 PROCEDURE — 36415 COLL VENOUS BLD VENIPUNCTURE: CPT | Performed by: INTERNAL MEDICINE

## 2024-06-13 PROCEDURE — 3078F DIAST BP <80 MM HG: CPT | Performed by: INTERNAL MEDICINE

## 2024-06-13 PROCEDURE — 1126F AMNT PAIN NOTED NONE PRSNT: CPT | Performed by: INTERNAL MEDICINE

## 2024-06-13 PROCEDURE — 1159F MED LIST DOCD IN RCRD: CPT | Performed by: INTERNAL MEDICINE

## 2024-06-13 PROCEDURE — 3077F SYST BP >= 140 MM HG: CPT | Performed by: INTERNAL MEDICINE

## 2024-06-13 ASSESSMENT — PAIN SCALES - GENERAL: PAINLEVEL: 0-NO PAIN

## 2024-06-13 NOTE — PROGRESS NOTES
Patient ID: Angel Garibay is a 84 y.o. male.  Referring Physician: Velasquez Giordano MD  [id number] [place] [address]  Primary Care Provider: Wilbur Jurado DO      Subjective    HPI    83 year old gentleman with past medical history of HL, HTN, CAD, SVT, BPH, vit D deficiency, anxiety who is following for hx of low grade follicular lymphoma     Presented to evaluation in Nov/Dec 2018 with Dr. Nils Alejandre   noted small nodule in left neck; 2 lymph nodes were seen on ultrasound, one of which was excised showing and Low grade follicular non-hodgkin lymphoma.   11/13/2018 US neck  left submandibular space there is an enlarged hypoechoic lymph node with increased vascular flow and thickened cortex measuring approximately 2.3 x 1.7 x 2.4 cm in size. The cortex of the lymph node measures approximately 1.7 in thickness. Smaller lymph node more laterally measures approximately 1.6 x 1.2 x 1.3 cm in size  Left neck node excisional bx 11/27/18: low grade FL   12/24/18: CT neck: The left neck has multiple enlarged lymph nodes extending from level 1 B inferiorly to level 4.  12/24/18 CT C/A/P: Haziness of the mesenteric fat along the distribution of the SMA, with borderline lymph nodes, the appearance similar to slightly more apparent when compared to the prior examination of 08/14/2015.    Received weekly Rituximab x 4 from 2/7/19 to 2/28/19 8/5/19 CT C/A/P: no evidence of disease, no repeat CT neck but clinical response reported in notes     Followed with surveillance     11/20/23: CT neck: the parotid gland bilaterally measuring 2.2 x 2.0 cm on the right and 2.6 x 2.0 cm on the left. There are 2 nodules within the superficial lobe of the right parotid gland measuring 1.1 x 0.9 and 1.3 x 1.2 cm.  12/12/23: PET scan: 1. Right parotid gland with two superficial hypermetabolic soft tissue nodules and additional large hypermetabolic deep right parotid medial soft tissue lesion. Hypermetabolic  soft tissue density located  medial to the left parotid gland. Additional multiple bilateral cervical hypermetabolic lymphadenopathy. Findings are compatible with relapsed lymphoma.  2. Multiple subdiaphragmatic abdominal and pelvic hyper metabolic lymphadenopathy and multiple hypermetabolic abdominal and pelvic peritoneal implants, consistent with blessing and extranodal disease involvement.  3. Left kidney with irregular contour, consider further correlation with renal ultrasound.    12/14/23: LYMPH NODE, PAROTID, CORE BIOPSY:  -- CLASSIC FOLLICULAR LYMPHOMA (WHO 2022).  SEE NOTE.   -- FOLLICULAR LYMPHOMA, GRADE 1-2 (ICC 2022).    He is feeling very well   Reports mild reaction with Rituxan, mild sore throat which resolved with benadryl   He is active  He works outside in the summertime    Lives with his wife   Eating and drinking well  Denies weight loss   Denies fever or night sweats   Denies abdominal pain, nausea or vomiting   Some PATEL    Interval history - 6/13/24      He is feeling well overall  The neck wound has healed,  Denies any new complaints otherwise,  Particularly denies any GI complaints, no nausea or vomiting, eating well, his weight is a stable  Energy is stable and able to dima out ADLs  No changes in his BM, no constipation or diarrhea   No chest pain or SOB, no cough   No additional new complaints    Past medical history     HL, HTN, CAD, SVT/PVCs, BPH, vit D deficiency, anxiety     Social history   Has one son   He worked in making airplanes   No smoking   Used to drink 1/5 a day, not recently     Family history   Father had prostate cancer     Review of Systems - Oncology   General: no change in appetite, no chills or fever  HEENT: no hearing loss, no mouth sores, no sore throat  EYES: no eyes problems   Respiratory: no cough, no shortness of breath   CV: no chest pain, no palpitations   GI: no abdominal pain, no diarrhea, no nausea or vomiting  : no difficulty urination, no dysuria or  frequency   MSUC: no arthralgias, no back pan   Skin: no itching   Neurological: no dizziness, no headaches   Phychiatric: no anxiety or depression   Hematologic: no bruising, no adenopathy     Objective   BSA: 2.33 meters squared  /77 (BP Location: Left arm, Patient Position: Sitting, BP Cuff Size: Large adult)   Pulse 90   Temp 36.5 °C (97.7 °F) (Temporal)   Resp 17   Wt 110 kg (243 lb 9.7 oz)   SpO2 94%   BMI 34.95 kg/m²     Wt Readings from Last 3 Encounters:   06/13/24 110 kg (243 lb 9.7 oz)   04/18/24 111 kg (245 lb 11.2 oz)   04/16/24 109 kg (239 lb 3.2 oz)   ]      No family history on file.  Oncology History   Non-Hodgkin's lymphoma (Multi)   7/29/2019 Initial Diagnosis    Non-Hodgkin's lymphoma (CMS/HCC)     1/24/2024 -  Chemotherapy    RiTUXimab (Weekly), 28 Day Cycle          Agnel NUÑEZ Lani  reports that he has never smoked. He has never used smokeless tobacco.  He  reports no history of alcohol use.  He  reports no history of drug use.    Physical Exam  Constitutional: alert, awake and oriented, not in acute distress   HEENT: moist mucous membranes, normal nose   Neck: supple, right parotid per facial mass/nodule still present at around the same size, incision wound with surrounding erythema but no drainage   EYES: PERRL, EOM intact, conjunctiva normal  CV: normal rate, regular rhythm, no murmur   Pulmonary: normal effort, no wheezing, equal air entry   Abdominal: soft, non tender, non distended, no palpable hepatosplenomegaly   : no CVA tenderness  Skin: no jaundice, rash or erythema  Neurological: AAOx3, no gross focal deficit   Psychiatric: normal mood and behavior   Lymph: right parotid/neck LAD    Performance Status:  Symptomatic; fully ambulatory    Assessment/Plan      Recurrent Follicular Lymphoma     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 s/p Rituxan x 4 weekly doses in 2018   Now with evidence of widespread recurrence although not much  symptomatic   Due to multiple areas of disease particularly in the abdomen/peritoneum at risk of complications would recommend treatment rather than surveillance at this time     We discussed re-treatment with Rituxan or Obinutuzumab vs anti-CD20 + Revlimid or Bendamustine, we discussed the pros and cons of each, he leans towards doing less and even considering not doing treatment     Recommended in this case that we do at least a repeat Rituximab course, he will think about it and let us know     1/24/2024-we discussed his options again and he expressed desire to proceed with treatment, we discussed his therapy options again including rituximab plus or minus additional agents  After our discussion the plan was to proceed with rituximab and reserve additional therapy in case of no response  Will plan for 4 weekly doses of Rituxan followed by restaging PET scan 1 month after completion at which point we will also discuss maintenance treatment    2/21/2024-status post 4 doses of weekly rituximab with good tolerance  We will follow-up restaging PET scan in around a month  If there is any concern we will consider biopsying the parotid gland as the original SUV was high to rule out any transformation  If there is response then we will discuss maintenance rituximab    3/27- PET scan after rituxan showed progressive disease and increased avidity   We will send to ENT for excisional LN biopsy r/o large cell transformation   Depending on biopsy we can determine treatment options for high vs low grade lymphoma regimen, he is also considering no treatment       4/18- s/p biopsy, the result is pending, will ask for RTC once the result is available, meantime ask for post op ENT check up    6/13-last appointment patient had deferred starting any treatment for his follicular lymphoma, on discussion today he again expressed that he does not think chemotherapy may be worth it but if it will provide significant survival benefit he  will consider, I provided him again today with information on Bendamustine which he is now considering, I will schedule him for a follow-up in few weeks with Dr. Gleason to consider treatment possibly with Bendamustine-obinutuzumab    In the meantime his labs are stable and he has no new clinical concerns    Velasquez Giordano MD

## 2024-06-13 NOTE — PATIENT INSTRUCTIONS
Today you met with your hematologist/oncologist.  Recent labs and biopsy were discussed and questions answered.  Scheduling orders were placed.  While we appreciate that you verbalized understanding, if any questions arise after leaving, please do not hesitate to call the office to discuss.  [phone] Katharina Lorenz.  Please read the information provided to you today on chemotherapy. You will return to the clinic in a few weeks to further discuss with Dr Gleason.

## 2024-07-08 ENCOUNTER — OFFICE VISIT (OUTPATIENT)
Dept: HEMATOLOGY/ONCOLOGY | Facility: CLINIC | Age: 84
End: 2024-07-08
Payer: MEDICARE

## 2024-07-08 VITALS
DIASTOLIC BLOOD PRESSURE: 74 MMHG | BODY MASS INDEX: 35.56 KG/M2 | SYSTOLIC BLOOD PRESSURE: 129 MMHG | RESPIRATION RATE: 16 BRPM | HEART RATE: 81 BPM | TEMPERATURE: 97.5 F | OXYGEN SATURATION: 95 % | WEIGHT: 247.8 LBS

## 2024-07-08 DIAGNOSIS — D48.62: ICD-10-CM

## 2024-07-08 DIAGNOSIS — N63.0 MASS OF NIPPLE: ICD-10-CM

## 2024-07-08 DIAGNOSIS — C82.18 GRADE 2 FOLLICULAR LYMPHOMA OF LYMPH NODES OF MULTIPLE REGIONS (MULTI): Primary | ICD-10-CM

## 2024-07-08 PROCEDURE — 1159F MED LIST DOCD IN RCRD: CPT | Performed by: INTERNAL MEDICINE

## 2024-07-08 PROCEDURE — 3074F SYST BP LT 130 MM HG: CPT | Performed by: INTERNAL MEDICINE

## 2024-07-08 PROCEDURE — 1126F AMNT PAIN NOTED NONE PRSNT: CPT | Performed by: INTERNAL MEDICINE

## 2024-07-08 PROCEDURE — 3078F DIAST BP <80 MM HG: CPT | Performed by: INTERNAL MEDICINE

## 2024-07-08 PROCEDURE — 99214 OFFICE O/P EST MOD 30 MIN: CPT | Performed by: INTERNAL MEDICINE

## 2024-07-08 RX ORDER — DIPHENHYDRAMINE HCL 50 MG
50 CAPSULE ORAL ONCE
OUTPATIENT
Start: 2024-07-29

## 2024-07-08 RX ORDER — FAMOTIDINE 10 MG/ML
20 INJECTION INTRAVENOUS ONCE AS NEEDED
OUTPATIENT
Start: 2024-07-23

## 2024-07-08 RX ORDER — PALONOSETRON 0.05 MG/ML
0.25 INJECTION, SOLUTION INTRAVENOUS ONCE
OUTPATIENT
Start: 2024-07-22

## 2024-07-08 RX ORDER — DIPHENHYDRAMINE HYDROCHLORIDE 50 MG/ML
50 INJECTION INTRAMUSCULAR; INTRAVENOUS AS NEEDED
OUTPATIENT
Start: 2024-07-29

## 2024-07-08 RX ORDER — DIPHENHYDRAMINE HYDROCHLORIDE 50 MG/ML
50 INJECTION INTRAMUSCULAR; INTRAVENOUS AS NEEDED
OUTPATIENT
Start: 2024-07-22

## 2024-07-08 RX ORDER — PROCHLORPERAZINE EDISYLATE 5 MG/ML
10 INJECTION INTRAMUSCULAR; INTRAVENOUS EVERY 6 HOURS PRN
OUTPATIENT
Start: 2024-07-23

## 2024-07-08 RX ORDER — DIPHENHYDRAMINE HYDROCHLORIDE 50 MG/ML
50 INJECTION INTRAMUSCULAR; INTRAVENOUS AS NEEDED
OUTPATIENT
Start: 2024-08-05

## 2024-07-08 RX ORDER — PROCHLORPERAZINE EDISYLATE 5 MG/ML
10 INJECTION INTRAMUSCULAR; INTRAVENOUS EVERY 6 HOURS PRN
OUTPATIENT
Start: 2024-07-22

## 2024-07-08 RX ORDER — ALBUTEROL SULFATE 0.83 MG/ML
3 SOLUTION RESPIRATORY (INHALATION) AS NEEDED
OUTPATIENT
Start: 2024-07-23

## 2024-07-08 RX ORDER — FAMOTIDINE 10 MG/ML
20 INJECTION INTRAVENOUS ONCE AS NEEDED
OUTPATIENT
Start: 2024-07-22

## 2024-07-08 RX ORDER — DIPHENHYDRAMINE HCL 50 MG
50 CAPSULE ORAL ONCE
OUTPATIENT
Start: 2024-07-22

## 2024-07-08 RX ORDER — ALBUTEROL SULFATE 0.83 MG/ML
3 SOLUTION RESPIRATORY (INHALATION) AS NEEDED
OUTPATIENT
Start: 2024-07-29

## 2024-07-08 RX ORDER — FAMOTIDINE 10 MG/ML
20 INJECTION INTRAVENOUS ONCE AS NEEDED
OUTPATIENT
Start: 2024-08-05

## 2024-07-08 RX ORDER — DIPHENHYDRAMINE HYDROCHLORIDE 50 MG/ML
50 INJECTION INTRAMUSCULAR; INTRAVENOUS AS NEEDED
OUTPATIENT
Start: 2024-07-24

## 2024-07-08 RX ORDER — ALBUTEROL SULFATE 0.83 MG/ML
3 SOLUTION RESPIRATORY (INHALATION) AS NEEDED
OUTPATIENT
Start: 2024-08-05

## 2024-07-08 RX ORDER — ACETAMINOPHEN 325 MG/1
650 TABLET ORAL ONCE
OUTPATIENT
Start: 2024-07-22

## 2024-07-08 RX ORDER — PROCHLORPERAZINE MALEATE 5 MG
10 TABLET ORAL EVERY 6 HOURS PRN
OUTPATIENT
Start: 2024-07-23

## 2024-07-08 RX ORDER — EPINEPHRINE 0.3 MG/.3ML
0.3 INJECTION SUBCUTANEOUS EVERY 5 MIN PRN
OUTPATIENT
Start: 2024-07-22

## 2024-07-08 RX ORDER — ALBUTEROL SULFATE 0.83 MG/ML
3 SOLUTION RESPIRATORY (INHALATION) AS NEEDED
OUTPATIENT
Start: 2024-07-24

## 2024-07-08 RX ORDER — EPINEPHRINE 0.3 MG/.3ML
0.3 INJECTION SUBCUTANEOUS EVERY 5 MIN PRN
OUTPATIENT
Start: 2024-07-23

## 2024-07-08 RX ORDER — ACETAMINOPHEN 325 MG/1
650 TABLET ORAL ONCE
OUTPATIENT
Start: 2024-08-05

## 2024-07-08 RX ORDER — FAMOTIDINE 10 MG/ML
20 INJECTION INTRAVENOUS ONCE AS NEEDED
OUTPATIENT
Start: 2024-07-24

## 2024-07-08 RX ORDER — EPINEPHRINE 0.3 MG/.3ML
0.3 INJECTION SUBCUTANEOUS EVERY 5 MIN PRN
OUTPATIENT
Start: 2024-07-29

## 2024-07-08 RX ORDER — PROCHLORPERAZINE MALEATE 5 MG
10 TABLET ORAL EVERY 6 HOURS PRN
OUTPATIENT
Start: 2024-07-22

## 2024-07-08 RX ORDER — DIPHENHYDRAMINE HYDROCHLORIDE 50 MG/ML
50 INJECTION INTRAMUSCULAR; INTRAVENOUS AS NEEDED
OUTPATIENT
Start: 2024-07-23

## 2024-07-08 RX ORDER — FAMOTIDINE 10 MG/ML
20 INJECTION INTRAVENOUS ONCE AS NEEDED
OUTPATIENT
Start: 2024-07-29

## 2024-07-08 RX ORDER — ALBUTEROL SULFATE 0.83 MG/ML
3 SOLUTION RESPIRATORY (INHALATION) AS NEEDED
OUTPATIENT
Start: 2024-07-22

## 2024-07-08 RX ORDER — EPINEPHRINE 0.3 MG/.3ML
0.3 INJECTION SUBCUTANEOUS EVERY 5 MIN PRN
OUTPATIENT
Start: 2024-07-24

## 2024-07-08 RX ORDER — DIPHENHYDRAMINE HCL 50 MG
50 CAPSULE ORAL ONCE
OUTPATIENT
Start: 2024-08-05

## 2024-07-08 RX ORDER — ACETAMINOPHEN 325 MG/1
650 TABLET ORAL ONCE
OUTPATIENT
Start: 2024-07-29

## 2024-07-08 RX ORDER — EPINEPHRINE 0.3 MG/.3ML
0.3 INJECTION SUBCUTANEOUS EVERY 5 MIN PRN
OUTPATIENT
Start: 2024-08-05

## 2024-07-08 ASSESSMENT — PAIN SCALES - GENERAL: PAINLEVEL: 0-NO PAIN

## 2024-07-08 NOTE — PATIENT INSTRUCTIONS
Today you met with your Medical Oncologist, Dr. Gleason.  At this appointment, you were provided with lexicomp sheets on the medications included in your upcoming regimen, an information sheet on watching the CHEMO CLASS prior to your first infusion, a copy of your signed consent, and your regimen schedule was discussed. While everyone's regimen is unique to them, your schedule will be discussed at every [facility] visit so you know when your next treatment appointment is. Please know that we encourage you to call our office for any concerns, the best number to call is [phone].

## 2024-07-08 NOTE — PROGRESS NOTES
Patient ID: Angel Garibay is a 84 y.o. male.  Referring Physician: Velasquez Giordano MD  [id number] [place] [address]  Primary Care Provider: Wilubr Jurado DO  Visit Type:  Follow Up     Subjective    HPI  83 year old gentleman with past medical history of HL, HTN, CAD, SVT, BPH, vit D deficiency, anxiety who is following for hx of low grade follicular lymphoma      Presented to evaluation in Nov/Dec 2018 with Dr. Nils Alejandre   noted small nodule in left neck; 2 lymph nodes were seen on ultrasound, one of which was excised showing and Low grade follicular non-hodgkin lymphoma.   11/13/2018 US neck  left submandibular space there is an enlarged hypoechoic lymph node with increased vascular flow and thickened cortex measuring approximately 2.3 x 1.7 x 2.4 cm in size. The cortex of the lymph node measures approximately 1.7 in thickness. Smaller lymph node more laterally measures approximately 1.6 x 1.2 x 1.3 cm in size  Left neck node excisional bx 11/27/18: low grade FL   12/24/18: CT neck: The left neck has multiple enlarged lymph nodes extending from level 1 B inferiorly to level 4.  12/24/18 CT C/A/P: Haziness of the mesenteric fat along the distribution of the SMA, with borderline lymph nodes, the appearance similar to slightly more apparent when compared to the prior examination of 08/14/2015.     Received weekly Rituximab x 4 from 2/7/19 to 2/28/19 8/5/19 CT C/A/P: no evidence of disease, no repeat CT neck but clinical response reported in notes      Followed with surveillance      11/20/23: CT neck: the parotid gland bilaterally measuring 2.2 x 2.0 cm on the right and 2.6 x 2.0 cm on the left. There are 2 nodules within the superficial lobe of the right parotid gland measuring 1.1 x 0.9 and 1.3 x 1.2 cm.  12/12/23: PET scan: 1. Right parotid gland with two superficial hypermetabolic soft tissue nodules and additional large hypermetabolic deep right parotid medial soft tissue  lesion. Hypermetabolic soft tissue density located  medial to the left parotid gland. Additional multiple bilateral cervical hypermetabolic lymphadenopathy. Findings are compatible with relapsed lymphoma.  2. Multiple subdiaphragmatic abdominal and pelvic hyper metabolic lymphadenopathy and multiple hypermetabolic abdominal and pelvic peritoneal implants, consistent with blessing and extranodal disease involvement.  3. Left kidney with irregular contour, consider further correlation with renal ultrasound.     12/14/23: LYMPH NODE, PAROTID, CORE BIOPSY:  -- CLASSIC FOLLICULAR LYMPHOMA (WHO 2022).  SEE NOTE.   -- FOLLICULAR LYMPHOMA, GRADE 1-2 (ICC 2022).  Review of Systems   Constitutional: Negative.    HENT:  Negative.     Eyes: Negative.    Respiratory: Negative.          Objective   BSA: 2.35 meters squared  /74 (BP Location: Left arm)   Pulse 81   Temp 36.4 °C (97.5 °F)   Resp 16   Wt 112 kg (247 lb 12.8 oz)   SpO2 95%   BMI 35.56 kg/m²      has a past medical history of Anxiety, BPH (benign prostatic hyperplasia), CAD (coronary artery disease), HLD (hyperlipidemia), Lymphedema, Paroxysmal SVT (supraventricular tachycardia) (CMS-HCC), Personal history of non-Hodgkin lymphomas (10/07/2021), and S/P drug eluting coronary stent placement.   has a past surgical history that includes US guided biopsy lymph node superficial (12/14/2023); Coronary stent placement; Cardiac catheterization; and Cholecystectomy.  No family history on file.  Oncology History   Non-Hodgkin's lymphoma (Multi)   7/29/2019 Initial Diagnosis    Non-Hodgkin's lymphoma (CMS/HCC)     1/24/2024 - 2/14/2024 Chemotherapy    RiTUXimab (Weekly), 28 Day Cycle          Angel Garibay  reports that he has never smoked. He has never used smokeless tobacco.  He  reports no history of alcohol use.  He  reports no history of drug use.    Physical Exam  Constitutional:       Appearance: Normal appearance.   HENT:      Head: Normocephalic and  "atraumatic.   Eyes:      Extraocular Movements: Extraocular movements intact.      Pupils: Pupils are equal, round, and reactive to light.   Neurological:      Mental Status: He is alert.         WBC   Date/Time Value Ref Range Status   06/13/2024 10:03 AM 5.9 4.4 - 11.3 x10*3/uL Final   03/27/2024 09:17 AM 5.5 4.4 - 11.3 x10*3/uL Final   02/14/2024 09:21 AM 5.7 4.4 - 11.3 x10*3/uL Final     No results found for: \"NRBC\"  RBC   Date Value Ref Range Status   06/13/2024 4.30 (L) 4.50 - 5.90 x10*6/uL Final   03/27/2024 4.18 (L) 4.50 - 5.90 x10*6/uL Final   02/14/2024 4.47 (L) 4.50 - 5.90 x10*6/uL Final     Hemoglobin   Date Value Ref Range Status   06/13/2024 13.3 (L) 13.5 - 17.5 g/dL Final   03/27/2024 12.7 (L) 13.5 - 17.5 g/dL Final   02/14/2024 13.6 13.5 - 17.5 g/dL Final     Hematocrit   Date Value Ref Range Status   06/13/2024 41.6 41.0 - 52.0 % Final   03/27/2024 40.2 (L) 41.0 - 52.0 % Final   02/14/2024 42.5 41.0 - 52.0 % Final     MCV   Date/Time Value Ref Range Status   06/13/2024 10:03 AM 97 80 - 100 fL Final   03/27/2024 09:17 AM 96 80 - 100 fL Final   02/14/2024 09:21 AM 95 80 - 100 fL Final     MCH   Date/Time Value Ref Range Status   06/13/2024 10:03 AM 30.9 26.0 - 34.0 pg Final   03/27/2024 09:17 AM 30.4 26.0 - 34.0 pg Final   02/14/2024 09:21 AM 30.4 26.0 - 34.0 pg Final     MCHC   Date/Time Value Ref Range Status   06/13/2024 10:03 AM 32.0 32.0 - 36.0 g/dL Final   03/27/2024 09:17 AM 31.6 (L) 32.0 - 36.0 g/dL Final   02/14/2024 09:21 AM 32.0 32.0 - 36.0 g/dL Final     RDW   Date/Time Value Ref Range Status   06/13/2024 10:03 AM 12.5 11.5 - 14.5 % Final   03/27/2024 09:17 AM 12.8 11.5 - 14.5 % Final   02/14/2024 09:21 AM 12.8 11.5 - 14.5 % Final     Platelets   Date/Time Value Ref Range Status   06/13/2024 10:03  150 - 450 x10*3/uL Final   03/27/2024 09:17  150 - 450 x10*3/uL Final   02/14/2024 09:21  150 - 450 x10*3/uL Final     No results found for: \"MPV\"  Neutrophils %   Date/Time " Value Ref Range Status   06/13/2024 10:03 AM 66.5 40.0 - 80.0 % Final   03/27/2024 09:17 AM 64.7 40.0 - 80.0 % Final   02/14/2024 09:21 AM 71.6 40.0 - 80.0 % Final     Immature Granulocytes %, Automated   Date/Time Value Ref Range Status   06/13/2024 10:03 AM 0.0 0.0 - 0.9 % Final     Comment:     Immature Granulocyte Count (IG) includes promyelocytes, myelocytes and metamyelocytes but does not include bands. Percent differential counts (%) should be interpreted in the context of the absolute cell counts (cells/UL).   03/27/2024 09:17 AM 0.0 0.0 - 0.9 % Final     Comment:     Immature Granulocyte Count (IG) includes promyelocytes, myelocytes and metamyelocytes but does not include bands. Percent differential counts (%) should be interpreted in the context of the absolute cell counts (cells/UL).   02/14/2024 09:21 AM 0.2 0.0 - 0.9 % Final     Comment:     Immature Granulocyte Count (IG) includes promyelocytes, myelocytes and metamyelocytes but does not include bands. Percent differential counts (%) should be interpreted in the context of the absolute cell counts (cells/UL).     Lymphocytes %   Date/Time Value Ref Range Status   06/13/2024 10:03 AM 19.8 13.0 - 44.0 % Final   03/27/2024 09:17 AM 20.4 13.0 - 44.0 % Final   02/14/2024 09:21 AM 16.2 13.0 - 44.0 % Final     Monocytes %   Date/Time Value Ref Range Status   06/13/2024 10:03 AM 9.3 2.0 - 10.0 % Final   03/27/2024 09:17 AM 10.6 2.0 - 10.0 % Final   02/14/2024 09:21 AM 8.5 2.0 - 10.0 % Final     Eosinophils %   Date/Time Value Ref Range Status   06/13/2024 10:03 AM 3.7 0.0 - 6.0 % Final   03/27/2024 09:17 AM 3.6 0.0 - 6.0 % Final   02/14/2024 09:21 AM 3.0 0.0 - 6.0 % Final     Basophils %   Date/Time Value Ref Range Status   06/13/2024 10:03 AM 0.7 0.0 - 2.0 % Final   03/27/2024 09:17 AM 0.7 0.0 - 2.0 % Final   02/14/2024 09:21 AM 0.5 0.0 - 2.0 % Final     Neutrophils Absolute   Date/Time Value Ref Range Status   06/13/2024 10:03 AM 3.93 1.60 - 5.50 x10*3/uL  Final     Comment:     Percent differential counts (%) should be interpreted in the context of the absolute cell counts (cells/uL).   03/27/2024 09:17 AM 3.58 1.60 - 5.50 x10*3/uL Final     Comment:     Percent differential counts (%) should be interpreted in the context of the absolute cell counts (cells/uL).   02/14/2024 09:21 AM 4.11 1.60 - 5.50 x10*3/uL Final     Comment:     Percent differential counts (%) should be interpreted in the context of the absolute cell counts (cells/uL).     Immature Granulocytes Absolute, Automated   Date/Time Value Ref Range Status   06/13/2024 10:03 AM 0.00 0.00 - 0.50 x10*3/uL Final   03/27/2024 09:17 AM 0.00 0.00 - 0.50 x10*3/uL Final   02/14/2024 09:21 AM 0.01 0.00 - 0.50 x10*3/uL Final     Lymphocytes Absolute   Date/Time Value Ref Range Status   06/13/2024 10:03 AM 1.17 0.80 - 3.00 x10*3/uL Final   03/27/2024 09:17 AM 1.13 0.80 - 3.00 x10*3/uL Final   02/14/2024 09:21 AM 0.93 0.80 - 3.00 x10*3/uL Final     Monocytes Absolute   Date/Time Value Ref Range Status   06/13/2024 10:03 AM 0.55 0.05 - 0.80 x10*3/uL Final   03/27/2024 09:17 AM 0.59 0.05 - 0.80 x10*3/uL Final   02/14/2024 09:21 AM 0.49 0.05 - 0.80 x10*3/uL Final     Eosinophils Absolute   Date/Time Value Ref Range Status   06/13/2024 10:03 AM 0.22 0.00 - 0.40 x10*3/uL Final   03/27/2024 09:17 AM 0.20 0.00 - 0.40 x10*3/uL Final   02/14/2024 09:21 AM 0.17 0.00 - 0.40 x10*3/uL Final     Basophils Absolute   Date/Time Value Ref Range Status   06/13/2024 10:03 AM 0.04 0.00 - 0.10 x10*3/uL Final   03/27/2024 09:17 AM 0.04 0.00 - 0.10 x10*3/uL Final   02/14/2024 09:21 AM 0.03 0.00 - 0.10 x10*3/uL Final     Assessment/Plan      Recurrent Follicular Lymphoma      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 s/p Rituxan x 4 weekly doses in 2018   Now with evidence of widespread recurrence although not much symptomatic   Due to multiple areas of disease particularly in the abdomen/peritoneum at risk  of complications would recommend treatment rather than surveillance at this time      We discussed re-treatment with Rituxan or Obinutuzumab vs anti-CD20 + Revlimid or Bendamustine, we discussed the pros and cons of each, he leans towards doing less and even considering not doing treatment      Recommended in this case that we do at least a repeat Rituximab course, he will think about it and let us know      1/24/2024-we discussed his options again and he expressed desire to proceed with treatment, we discussed his therapy options again including rituximab plus or minus additional agents  After our discussion the plan was to proceed with rituximab and reserve additional therapy in case of no response  Will plan for 4 weekly doses of Rituxan followed by restaging PET scan 1 month after completion at which point we will also discuss maintenance treatment     2/21/2024-status post 4 doses of weekly rituximab with good tolerance  We will follow-up restaging PET scan in around a month  If there is any concern we will consider biopsying the parotid gland as the original SUV was high to rule out any transformation  If there is response then we will discuss maintenance rituximab     3/27- PET scan after rituxan showed progressive disease and increased avidity   We will send to ENT for excisional LN biopsy r/o large cell transformation   Depending on biopsy we can determine treatment options for high vs low grade lymphoma regimen, he is also considering no treatment         4/18- s/p biopsy, the result is pending, will ask for RTC once the result is available, meantime ask for post op ENT check up     6/13-last appointment patient had deferred starting any treatment for his follicular lymphoma, on discussion today he again expressed that he does not think chemotherapy may be worth it but if it will provide significant survival benefit he will consider, I provided him again today with information on Bendamustine which he is  now considering, I will schedule him for a follow-up in few weeks with Dr. Gleason to consider treatment possibly with Bendamustine-obinutuzumab: Patient seen on 7/8/2024 and patient consented.  I had to spend 45 minutes explaining side effects of the medication and the benefits.  Mediport placement cycle 1 07/21/2024.     In the meantime his labs are stable and he has no new clinical concerns       Sore left nipple: Mammogram     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 (Multi)  -     Clinic Appointment Request New Patient (July 8, 11 or 12 @ noon with OO)  -     Clinic Appointment Request; Future  -     Infusion Appointment Request; Future  -     CBC and Auto Differential; Future  -     Comprehensive metabolic panel; Future  -     Infusion Appointment Request; Future  -     Infusion Appointment Request; Future  -     Infusion Appointment Request; Future  -     CBC and Auto Differential; Future  -     Comprehensive metabolic panel; Future  -     Infusion Appointment Request; Future  -     CBC and Auto Differential; Future  -     Comprehensive metabolic panel; Future  -     BI mammo bilateral diagnostic tomosynthesis; Future  -     Consult to Interventional Radiology; Future  Mass of nipple  -     BI mammo bilateral diagnostic tomosynthesis; Future  -     Consult to Interventional Radiology; Future  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  -     BI mammo bilateral diagnostic tomosynthesis; Future  -     Consult to Interventional Radiology; Future  Other orders  -     acetaminophen (Tylenol) tablet 650 mg  -     diphenhydrAMINE (BENADryl) capsule 50 mg  -     dexAMETHasone (Decadron) 20 mg in dextrose 5% 50 mL IV  -     palonosetron (Aloxi) injection 250 mcg  -     prochlorperazine (Compazine) tablet 10 mg  -     prochlorperazine (Compazine) injection 10 mg  -     bendamustine (Bendeka) 212.5 mg in sodium chloride 0.9% 58.5 mL IV  -      obinutuzumab (Gazyva) 1,000 mg in sodium chloride 0.9% 290 mL IV  -     sodium chloride 0.9 % bolus 500 mL  -     dextrose 5 % in water (D5W) bolus  -     diphenhydrAMINE (BENADryl) injection 50 mg  -     methylPREDNISolone sod succinate (SOLU-Medrol) 40 mg/mL injection 40 mg  -     famotidine PF (Pepcid) injection 20 mg  -     EPINEPHrine (Epipen) injection syringe 0.3 mg  -     albuterol 2.5 mg /3 mL (0.083 %) nebulizer solution 3 mL  -     dexAMETHasone (Decadron) 12 mg in dextrose 5% 50 mL IV  -     prochlorperazine (Compazine) tablet 10 mg  -     prochlorperazine (Compazine) injection 10 mg  -     bendamustine (Bendeka) 212.5 mg in sodium chloride 0.9% 58.5 mL IV  -     sodium chloride 0.9 % bolus 500 mL  -     dextrose 5 % in water (D5W) bolus  -     diphenhydrAMINE (BENADryl) injection 50 mg  -     methylPREDNISolone sod succinate (SOLU-Medrol) 40 mg/mL injection 40 mg  -     famotidine PF (Pepcid) injection 20 mg  -     EPINEPHrine (Epipen) injection syringe 0.3 mg  -     albuterol 2.5 mg /3 mL (0.083 %) nebulizer solution 3 mL  -     pegfilgrastim-cbqv (Udenyca) injection 6 mg  -     sodium chloride 0.9 % bolus 500 mL  -     dextrose 5 % in water (D5W) bolus  -     diphenhydrAMINE (BENADryl) injection 50 mg  -     methylPREDNISolone sod succinate (SOLU-Medrol) 40 mg/mL injection 40 mg  -     famotidine PF (Pepcid) injection 20 mg  -     EPINEPHrine (Epipen) injection syringe 0.3 mg  -     albuterol 2.5 mg /3 mL (0.083 %) nebulizer solution 3 mL  -     acetaminophen (Tylenol) tablet 650 mg  -     diphenhydrAMINE (BENADryl) capsule 50 mg  -     dexAMETHasone (Decadron) 20 mg in dextrose 5% 50 mL IV  -     obinutuzumab (Gazyva) 1,000 mg in sodium chloride 0.9% 290 mL IV  -     sodium chloride 0.9 % bolus 500 mL  -     dextrose 5 % in water (D5W) bolus  -     diphenhydrAMINE (BENADryl) injection 50 mg  -     methylPREDNISolone sod succinate (SOLU-Medrol) 40 mg/mL injection 40 mg  -     famotidine PF (Pepcid)  injection 20 mg  -     EPINEPHrine (Epipen) injection syringe 0.3 mg  -     albuterol 2.5 mg /3 mL (0.083 %) nebulizer solution 3 mL  -     acetaminophen (Tylenol) tablet 650 mg  -     diphenhydrAMINE (BENADryl) capsule 50 mg  -     dexAMETHasone (Decadron) 20 mg in dextrose 5% 50 mL IV  -     obinutuzumab (Gazyva) 1,000 mg in sodium chloride 0.9% 290 mL IV  -     sodium chloride 0.9 % bolus 500 mL  -     dextrose 5 % in water (D5W) bolus  -     diphenhydrAMINE (BENADryl) injection 50 mg  -     methylPREDNISolone sod succinate (SOLU-Medrol) 40 mg/mL injection 40 mg  -     famotidine PF (Pepcid) injection 20 mg  -     EPINEPHrine (Epipen) injection syringe 0.3 mg  -     albuterol 2.5 mg /3 mL (0.083 %) nebulizer solution 3 mL           Akila Gleason MD

## 2024-07-10 ASSESSMENT — ENCOUNTER SYMPTOMS
RESPIRATORY NEGATIVE: 1
CONSTITUTIONAL NEGATIVE: 1
EYES NEGATIVE: 1

## 2024-07-19 ENCOUNTER — HOSPITAL ENCOUNTER (OUTPATIENT)
Dept: RADIOLOGY | Facility: HOSPITAL | Age: 84
Discharge: HOME | End: 2024-07-19
Payer: MEDICARE

## 2024-07-19 VITALS — HEIGHT: 70 IN | WEIGHT: 245 LBS | BODY MASS INDEX: 35.07 KG/M2

## 2024-07-19 DIAGNOSIS — D48.62: ICD-10-CM

## 2024-07-19 DIAGNOSIS — C82.18 GRADE 2 FOLLICULAR LYMPHOMA OF LYMPH NODES OF MULTIPLE REGIONS (MULTI): ICD-10-CM

## 2024-07-19 DIAGNOSIS — N63.0 MASS OF NIPPLE: ICD-10-CM

## 2024-07-19 PROCEDURE — 76982 USE 1ST TARGET LESION: CPT | Mod: 50

## 2024-07-19 PROCEDURE — 76642 ULTRASOUND BREAST LIMITED: CPT | Mod: 50

## 2024-07-19 PROCEDURE — 77062 BREAST TOMOSYNTHESIS BI: CPT

## 2024-07-22 ENCOUNTER — INFUSION (OUTPATIENT)
Dept: HEMATOLOGY/ONCOLOGY | Facility: CLINIC | Age: 84
End: 2024-07-22
Payer: MEDICARE

## 2024-07-22 ENCOUNTER — OFFICE VISIT (OUTPATIENT)
Dept: HEMATOLOGY/ONCOLOGY | Facility: CLINIC | Age: 84
End: 2024-07-22
Payer: MEDICARE

## 2024-07-22 VITALS
TEMPERATURE: 97.3 F | BODY MASS INDEX: 35.14 KG/M2 | HEART RATE: 99 BPM | DIASTOLIC BLOOD PRESSURE: 77 MMHG | RESPIRATION RATE: 16 BRPM | SYSTOLIC BLOOD PRESSURE: 146 MMHG | OXYGEN SATURATION: 94 % | WEIGHT: 244.93 LBS

## 2024-07-22 VITALS
DIASTOLIC BLOOD PRESSURE: 73 MMHG | HEART RATE: 87 BPM | SYSTOLIC BLOOD PRESSURE: 128 MMHG | OXYGEN SATURATION: 96 % | RESPIRATION RATE: 16 BRPM | TEMPERATURE: 97.3 F

## 2024-07-22 DIAGNOSIS — C82.18 GRADE 2 FOLLICULAR LYMPHOMA OF LYMPH NODES OF MULTIPLE REGIONS (MULTI): Primary | ICD-10-CM

## 2024-07-22 DIAGNOSIS — C82.18 GRADE 2 FOLLICULAR LYMPHOMA OF LYMPH NODES OF MULTIPLE REGIONS (MULTI): ICD-10-CM

## 2024-07-22 LAB
ALBUMIN SERPL BCP-MCNC: 4 G/DL (ref 3.4–5)
ALP SERPL-CCNC: 94 U/L (ref 33–136)
ALT SERPL W P-5'-P-CCNC: 16 U/L (ref 10–52)
ANION GAP SERPL CALC-SCNC: 11 MMOL/L (ref 10–20)
AST SERPL W P-5'-P-CCNC: 22 U/L (ref 9–39)
BASOPHILS # BLD AUTO: 0.04 X10*3/UL (ref 0–0.1)
BASOPHILS NFR BLD AUTO: 0.7 %
BILIRUB SERPL-MCNC: 0.6 MG/DL (ref 0–1.2)
BUN SERPL-MCNC: 15 MG/DL (ref 6–23)
CALCIUM SERPL-MCNC: 9.2 MG/DL (ref 8.6–10.3)
CHLORIDE SERPL-SCNC: 104 MMOL/L (ref 98–107)
CO2 SERPL-SCNC: 27 MMOL/L (ref 21–32)
CREAT SERPL-MCNC: 0.93 MG/DL (ref 0.5–1.3)
EGFRCR SERPLBLD CKD-EPI 2021: 80 ML/MIN/1.73M*2
EOSINOPHIL # BLD AUTO: 0.29 X10*3/UL (ref 0–0.4)
EOSINOPHIL NFR BLD AUTO: 4.8 %
ERYTHROCYTE [DISTWIDTH] IN BLOOD BY AUTOMATED COUNT: 12.2 % (ref 11.5–14.5)
GLUCOSE SERPL-MCNC: 118 MG/DL (ref 74–99)
HCT VFR BLD AUTO: 39.2 % (ref 41–52)
HGB BLD-MCNC: 12.6 G/DL (ref 13.5–17.5)
IMM GRANULOCYTES # BLD AUTO: 0.01 X10*3/UL (ref 0–0.5)
IMM GRANULOCYTES NFR BLD AUTO: 0.2 % (ref 0–0.9)
LYMPHOCYTES # BLD AUTO: 1.18 X10*3/UL (ref 0.8–3)
LYMPHOCYTES NFR BLD AUTO: 19.6 %
MCH RBC QN AUTO: 31.1 PG (ref 26–34)
MCHC RBC AUTO-ENTMCNC: 32.1 G/DL (ref 32–36)
MCV RBC AUTO: 97 FL (ref 80–100)
MONOCYTES # BLD AUTO: 0.49 X10*3/UL (ref 0.05–0.8)
MONOCYTES NFR BLD AUTO: 8.2 %
NEUTROPHILS # BLD AUTO: 4 X10*3/UL (ref 1.6–5.5)
NEUTROPHILS NFR BLD AUTO: 66.5 %
PLATELET # BLD AUTO: 242 X10*3/UL (ref 150–450)
POTASSIUM SERPL-SCNC: 3.8 MMOL/L (ref 3.5–5.3)
PROT SERPL-MCNC: 7.1 G/DL (ref 6.4–8.2)
RBC # BLD AUTO: 4.05 X10*6/UL (ref 4.5–5.9)
SODIUM SERPL-SCNC: 138 MMOL/L (ref 136–145)
WBC # BLD AUTO: 6 X10*3/UL (ref 4.4–11.3)

## 2024-07-22 PROCEDURE — 3077F SYST BP >= 140 MM HG: CPT | Performed by: INTERNAL MEDICINE

## 2024-07-22 PROCEDURE — 85025 COMPLETE CBC W/AUTO DIFF WBC: CPT | Performed by: INTERNAL MEDICINE

## 2024-07-22 PROCEDURE — 1126F AMNT PAIN NOTED NONE PRSNT: CPT | Performed by: INTERNAL MEDICINE

## 2024-07-22 PROCEDURE — 99214 OFFICE O/P EST MOD 30 MIN: CPT | Mod: 25 | Performed by: INTERNAL MEDICINE

## 2024-07-22 PROCEDURE — 96413 CHEMO IV INFUSION 1 HR: CPT

## 2024-07-22 PROCEDURE — 96415 CHEMO IV INFUSION ADDL HR: CPT

## 2024-07-22 PROCEDURE — 2500000001 HC RX 250 WO HCPCS SELF ADMINISTERED DRUGS (ALT 637 FOR MEDICARE OP): Performed by: INTERNAL MEDICINE

## 2024-07-22 PROCEDURE — 3078F DIAST BP <80 MM HG: CPT | Performed by: INTERNAL MEDICINE

## 2024-07-22 PROCEDURE — 1159F MED LIST DOCD IN RCRD: CPT | Performed by: INTERNAL MEDICINE

## 2024-07-22 PROCEDURE — 2500000004 HC RX 250 GENERAL PHARMACY W/ HCPCS (ALT 636 FOR OP/ED): Mod: JG | Performed by: INTERNAL MEDICINE

## 2024-07-22 PROCEDURE — 99214 OFFICE O/P EST MOD 30 MIN: CPT | Performed by: INTERNAL MEDICINE

## 2024-07-22 PROCEDURE — 2500000004 HC RX 250 GENERAL PHARMACY W/ HCPCS (ALT 636 FOR OP/ED): Performed by: INTERNAL MEDICINE

## 2024-07-22 PROCEDURE — 80053 COMPREHEN METABOLIC PANEL: CPT | Performed by: INTERNAL MEDICINE

## 2024-07-22 PROCEDURE — 96411 CHEMO IV PUSH ADDL DRUG: CPT

## 2024-07-22 PROCEDURE — 96375 TX/PRO/DX INJ NEW DRUG ADDON: CPT | Mod: INF

## 2024-07-22 RX ORDER — DIPHENHYDRAMINE HYDROCHLORIDE 50 MG/ML
50 INJECTION INTRAMUSCULAR; INTRAVENOUS AS NEEDED
OUTPATIENT
Start: 2024-08-21

## 2024-07-22 RX ORDER — EPINEPHRINE 0.3 MG/.3ML
0.3 INJECTION SUBCUTANEOUS EVERY 5 MIN PRN
OUTPATIENT
Start: 2024-11-11

## 2024-07-22 RX ORDER — HEPARIN SODIUM,PORCINE/PF 10 UNIT/ML
50 SYRINGE (ML) INTRAVENOUS AS NEEDED
Status: CANCELLED | OUTPATIENT
Start: 2024-07-22

## 2024-07-22 RX ORDER — EPINEPHRINE 0.3 MG/.3ML
0.3 INJECTION SUBCUTANEOUS EVERY 5 MIN PRN
OUTPATIENT
Start: 2024-10-15

## 2024-07-22 RX ORDER — FAMOTIDINE 10 MG/ML
20 INJECTION INTRAVENOUS ONCE AS NEEDED
OUTPATIENT
Start: 2024-10-15

## 2024-07-22 RX ORDER — DIPHENHYDRAMINE HYDROCHLORIDE 50 MG/ML
50 INJECTION INTRAMUSCULAR; INTRAVENOUS AS NEEDED
OUTPATIENT
Start: 2024-09-17

## 2024-07-22 RX ORDER — ALBUTEROL SULFATE 0.83 MG/ML
3 SOLUTION RESPIRATORY (INHALATION) AS NEEDED
Status: DISCONTINUED | OUTPATIENT
Start: 2024-07-22 | End: 2024-07-22 | Stop reason: HOSPADM

## 2024-07-22 RX ORDER — ALBUTEROL SULFATE 0.83 MG/ML
3 SOLUTION RESPIRATORY (INHALATION) AS NEEDED
OUTPATIENT
Start: 2024-08-20

## 2024-07-22 RX ORDER — ALBUTEROL SULFATE 0.83 MG/ML
3 SOLUTION RESPIRATORY (INHALATION) AS NEEDED
OUTPATIENT
Start: 2024-10-15

## 2024-07-22 RX ORDER — EPINEPHRINE 0.3 MG/.3ML
0.3 INJECTION SUBCUTANEOUS EVERY 5 MIN PRN
OUTPATIENT
Start: 2024-09-17

## 2024-07-22 RX ORDER — FAMOTIDINE 10 MG/ML
20 INJECTION INTRAVENOUS ONCE AS NEEDED
Status: DISCONTINUED | OUTPATIENT
Start: 2024-07-22 | End: 2024-07-22 | Stop reason: HOSPADM

## 2024-07-22 RX ORDER — ACETAMINOPHEN 325 MG/1
650 TABLET ORAL ONCE
OUTPATIENT
Start: 2024-08-19

## 2024-07-22 RX ORDER — FAMOTIDINE 10 MG/ML
20 INJECTION INTRAVENOUS ONCE AS NEEDED
OUTPATIENT
Start: 2024-11-13

## 2024-07-22 RX ORDER — DIPHENHYDRAMINE HYDROCHLORIDE 50 MG/ML
50 INJECTION INTRAMUSCULAR; INTRAVENOUS AS NEEDED
OUTPATIENT
Start: 2024-10-15

## 2024-07-22 RX ORDER — PROCHLORPERAZINE MALEATE 5 MG
10 TABLET ORAL EVERY 6 HOURS PRN
OUTPATIENT
Start: 2024-09-17

## 2024-07-22 RX ORDER — EPINEPHRINE 0.3 MG/.3ML
0.3 INJECTION SUBCUTANEOUS EVERY 5 MIN PRN
OUTPATIENT
Start: 2024-11-13

## 2024-07-22 RX ORDER — EPINEPHRINE 0.3 MG/.3ML
0.3 INJECTION SUBCUTANEOUS EVERY 5 MIN PRN
Status: DISCONTINUED | OUTPATIENT
Start: 2024-07-22 | End: 2024-07-22 | Stop reason: HOSPADM

## 2024-07-22 RX ORDER — PROCHLORPERAZINE MALEATE 5 MG
10 TABLET ORAL EVERY 6 HOURS PRN
OUTPATIENT
Start: 2024-08-20

## 2024-07-22 RX ORDER — DIPHENHYDRAMINE HYDROCHLORIDE 50 MG/ML
50 INJECTION INTRAMUSCULAR; INTRAVENOUS AS NEEDED
OUTPATIENT
Start: 2024-11-12

## 2024-07-22 RX ORDER — ALBUTEROL SULFATE 0.83 MG/ML
3 SOLUTION RESPIRATORY (INHALATION) AS NEEDED
OUTPATIENT
Start: 2024-11-11

## 2024-07-22 RX ORDER — ACETAMINOPHEN 325 MG/1
650 TABLET ORAL ONCE
Status: COMPLETED | OUTPATIENT
Start: 2024-07-22 | End: 2024-07-22

## 2024-07-22 RX ORDER — HEPARIN 100 UNIT/ML
500 SYRINGE INTRAVENOUS AS NEEDED
Status: CANCELLED | OUTPATIENT
Start: 2024-07-22

## 2024-07-22 RX ORDER — PROCHLORPERAZINE EDISYLATE 5 MG/ML
10 INJECTION INTRAMUSCULAR; INTRAVENOUS EVERY 6 HOURS PRN
OUTPATIENT
Start: 2024-11-12

## 2024-07-22 RX ORDER — FAMOTIDINE 10 MG/ML
20 INJECTION INTRAVENOUS ONCE AS NEEDED
OUTPATIENT
Start: 2024-10-16

## 2024-07-22 RX ORDER — EPINEPHRINE 0.3 MG/.3ML
0.3 INJECTION SUBCUTANEOUS EVERY 5 MIN PRN
OUTPATIENT
Start: 2024-08-20

## 2024-07-22 RX ORDER — PROCHLORPERAZINE EDISYLATE 5 MG/ML
10 INJECTION INTRAMUSCULAR; INTRAVENOUS EVERY 6 HOURS PRN
OUTPATIENT
Start: 2024-10-15

## 2024-07-22 RX ORDER — FAMOTIDINE 10 MG/ML
20 INJECTION INTRAVENOUS ONCE AS NEEDED
OUTPATIENT
Start: 2024-10-14

## 2024-07-22 RX ORDER — DIPHENHYDRAMINE HYDROCHLORIDE 50 MG/ML
50 INJECTION INTRAMUSCULAR; INTRAVENOUS AS NEEDED
OUTPATIENT
Start: 2024-10-16

## 2024-07-22 RX ORDER — FAMOTIDINE 10 MG/ML
20 INJECTION INTRAVENOUS ONCE AS NEEDED
OUTPATIENT
Start: 2024-09-18

## 2024-07-22 RX ORDER — DIPHENHYDRAMINE HYDROCHLORIDE 50 MG/ML
50 INJECTION INTRAMUSCULAR; INTRAVENOUS AS NEEDED
Status: DISCONTINUED | OUTPATIENT
Start: 2024-07-22 | End: 2024-07-22 | Stop reason: HOSPADM

## 2024-07-22 RX ORDER — EPINEPHRINE 0.3 MG/.3ML
0.3 INJECTION SUBCUTANEOUS EVERY 5 MIN PRN
OUTPATIENT
Start: 2024-10-16

## 2024-07-22 RX ORDER — EPINEPHRINE 0.3 MG/.3ML
0.3 INJECTION SUBCUTANEOUS EVERY 5 MIN PRN
OUTPATIENT
Start: 2024-09-16

## 2024-07-22 RX ORDER — DIPHENHYDRAMINE HYDROCHLORIDE 50 MG/ML
50 INJECTION INTRAMUSCULAR; INTRAVENOUS AS NEEDED
OUTPATIENT
Start: 2024-10-14

## 2024-07-22 RX ORDER — PROCHLORPERAZINE EDISYLATE 5 MG/ML
10 INJECTION INTRAMUSCULAR; INTRAVENOUS EVERY 6 HOURS PRN
OUTPATIENT
Start: 2024-11-11

## 2024-07-22 RX ORDER — ALBUTEROL SULFATE 0.83 MG/ML
3 SOLUTION RESPIRATORY (INHALATION) AS NEEDED
OUTPATIENT
Start: 2024-10-16

## 2024-07-22 RX ORDER — PROCHLORPERAZINE EDISYLATE 5 MG/ML
10 INJECTION INTRAMUSCULAR; INTRAVENOUS EVERY 6 HOURS PRN
Status: DISCONTINUED | OUTPATIENT
Start: 2024-07-22 | End: 2024-07-22 | Stop reason: HOSPADM

## 2024-07-22 RX ORDER — PROCHLORPERAZINE EDISYLATE 5 MG/ML
10 INJECTION INTRAMUSCULAR; INTRAVENOUS EVERY 6 HOURS PRN
OUTPATIENT
Start: 2024-08-20

## 2024-07-22 RX ORDER — DEXAMETHASONE IN 0.9 % SOD CHL 20 MG/50ML
20 INTRAVENOUS SOLUTION, PIGGYBACK (ML) INTRAVENOUS ONCE
Status: COMPLETED | OUTPATIENT
Start: 2024-07-22 | End: 2024-07-22

## 2024-07-22 RX ORDER — DIPHENHYDRAMINE HCL 50 MG
50 CAPSULE ORAL ONCE
OUTPATIENT
Start: 2024-08-19

## 2024-07-22 RX ORDER — ACETAMINOPHEN 325 MG/1
650 TABLET ORAL ONCE
OUTPATIENT
Start: 2024-10-14

## 2024-07-22 RX ORDER — PALONOSETRON 0.05 MG/ML
0.25 INJECTION, SOLUTION INTRAVENOUS ONCE
OUTPATIENT
Start: 2024-10-14

## 2024-07-22 RX ORDER — ALBUTEROL SULFATE 0.83 MG/ML
3 SOLUTION RESPIRATORY (INHALATION) AS NEEDED
OUTPATIENT
Start: 2024-08-21

## 2024-07-22 RX ORDER — PALONOSETRON 0.05 MG/ML
0.25 INJECTION, SOLUTION INTRAVENOUS ONCE
OUTPATIENT
Start: 2024-11-11

## 2024-07-22 RX ORDER — FAMOTIDINE 10 MG/ML
20 INJECTION INTRAVENOUS ONCE AS NEEDED
OUTPATIENT
Start: 2024-11-11

## 2024-07-22 RX ORDER — DIPHENHYDRAMINE HYDROCHLORIDE 50 MG/ML
50 INJECTION INTRAMUSCULAR; INTRAVENOUS AS NEEDED
OUTPATIENT
Start: 2024-08-19

## 2024-07-22 RX ORDER — ALBUTEROL SULFATE 0.83 MG/ML
3 SOLUTION RESPIRATORY (INHALATION) AS NEEDED
OUTPATIENT
Start: 2024-09-18

## 2024-07-22 RX ORDER — DIPHENHYDRAMINE HYDROCHLORIDE 50 MG/ML
50 INJECTION INTRAMUSCULAR; INTRAVENOUS AS NEEDED
OUTPATIENT
Start: 2024-11-13

## 2024-07-22 RX ORDER — PROCHLORPERAZINE MALEATE 5 MG
10 TABLET ORAL EVERY 6 HOURS PRN
OUTPATIENT
Start: 2024-11-11

## 2024-07-22 RX ORDER — FAMOTIDINE 10 MG/ML
20 INJECTION INTRAVENOUS ONCE AS NEEDED
OUTPATIENT
Start: 2024-08-19

## 2024-07-22 RX ORDER — PROCHLORPERAZINE MALEATE 10 MG
10 TABLET ORAL EVERY 6 HOURS PRN
Status: DISCONTINUED | OUTPATIENT
Start: 2024-07-22 | End: 2024-07-22 | Stop reason: HOSPADM

## 2024-07-22 RX ORDER — DIPHENHYDRAMINE HYDROCHLORIDE 50 MG/ML
50 INJECTION INTRAMUSCULAR; INTRAVENOUS AS NEEDED
OUTPATIENT
Start: 2024-11-11

## 2024-07-22 RX ORDER — DIPHENHYDRAMINE HCL 50 MG
50 CAPSULE ORAL ONCE
OUTPATIENT
Start: 2024-11-11

## 2024-07-22 RX ORDER — ALBUTEROL SULFATE 0.83 MG/ML
3 SOLUTION RESPIRATORY (INHALATION) AS NEEDED
OUTPATIENT
Start: 2024-08-19

## 2024-07-22 RX ORDER — FAMOTIDINE 10 MG/ML
20 INJECTION INTRAVENOUS ONCE AS NEEDED
OUTPATIENT
Start: 2024-11-12

## 2024-07-22 RX ORDER — ALBUTEROL SULFATE 0.83 MG/ML
3 SOLUTION RESPIRATORY (INHALATION) AS NEEDED
OUTPATIENT
Start: 2024-09-16

## 2024-07-22 RX ORDER — FAMOTIDINE 10 MG/ML
20 INJECTION INTRAVENOUS ONCE AS NEEDED
OUTPATIENT
Start: 2024-08-20

## 2024-07-22 RX ORDER — DIPHENHYDRAMINE HYDROCHLORIDE 50 MG/ML
50 INJECTION INTRAMUSCULAR; INTRAVENOUS AS NEEDED
OUTPATIENT
Start: 2024-08-20

## 2024-07-22 RX ORDER — PROCHLORPERAZINE EDISYLATE 5 MG/ML
10 INJECTION INTRAMUSCULAR; INTRAVENOUS EVERY 6 HOURS PRN
OUTPATIENT
Start: 2024-09-16

## 2024-07-22 RX ORDER — ACETAMINOPHEN 325 MG/1
650 TABLET ORAL ONCE
OUTPATIENT
Start: 2024-09-16

## 2024-07-22 RX ORDER — DIPHENHYDRAMINE HYDROCHLORIDE 50 MG/ML
50 INJECTION INTRAMUSCULAR; INTRAVENOUS AS NEEDED
OUTPATIENT
Start: 2024-09-18

## 2024-07-22 RX ORDER — EPINEPHRINE 0.3 MG/.3ML
0.3 INJECTION SUBCUTANEOUS EVERY 5 MIN PRN
OUTPATIENT
Start: 2024-08-19

## 2024-07-22 RX ORDER — PROCHLORPERAZINE MALEATE 5 MG
10 TABLET ORAL EVERY 6 HOURS PRN
OUTPATIENT
Start: 2024-11-12

## 2024-07-22 RX ORDER — PROCHLORPERAZINE MALEATE 5 MG
10 TABLET ORAL EVERY 6 HOURS PRN
OUTPATIENT
Start: 2024-10-14

## 2024-07-22 RX ORDER — ALBUTEROL SULFATE 0.83 MG/ML
3 SOLUTION RESPIRATORY (INHALATION) AS NEEDED
OUTPATIENT
Start: 2024-10-14

## 2024-07-22 RX ORDER — ALBUTEROL SULFATE 0.83 MG/ML
3 SOLUTION RESPIRATORY (INHALATION) AS NEEDED
OUTPATIENT
Start: 2024-11-13

## 2024-07-22 RX ORDER — EPINEPHRINE 0.3 MG/.3ML
0.3 INJECTION SUBCUTANEOUS EVERY 5 MIN PRN
OUTPATIENT
Start: 2024-08-21

## 2024-07-22 RX ORDER — ALBUTEROL SULFATE 0.83 MG/ML
3 SOLUTION RESPIRATORY (INHALATION) AS NEEDED
OUTPATIENT
Start: 2024-11-12

## 2024-07-22 RX ORDER — FAMOTIDINE 10 MG/ML
20 INJECTION INTRAVENOUS ONCE AS NEEDED
OUTPATIENT
Start: 2024-09-17

## 2024-07-22 RX ORDER — PROCHLORPERAZINE MALEATE 5 MG
10 TABLET ORAL EVERY 6 HOURS PRN
OUTPATIENT
Start: 2024-10-15

## 2024-07-22 RX ORDER — DIPHENHYDRAMINE HCL 50 MG
50 CAPSULE ORAL ONCE
OUTPATIENT
Start: 2024-10-14

## 2024-07-22 RX ORDER — EPINEPHRINE 0.3 MG/.3ML
0.3 INJECTION SUBCUTANEOUS EVERY 5 MIN PRN
OUTPATIENT
Start: 2024-11-12

## 2024-07-22 RX ORDER — ACETAMINOPHEN 325 MG/1
650 TABLET ORAL ONCE
OUTPATIENT
Start: 2024-11-11

## 2024-07-22 RX ORDER — PALONOSETRON 0.05 MG/ML
0.25 INJECTION, SOLUTION INTRAVENOUS ONCE
OUTPATIENT
Start: 2024-09-16

## 2024-07-22 RX ORDER — DIPHENHYDRAMINE HYDROCHLORIDE 50 MG/ML
50 INJECTION INTRAMUSCULAR; INTRAVENOUS AS NEEDED
OUTPATIENT
Start: 2024-09-16

## 2024-07-22 RX ORDER — PROCHLORPERAZINE EDISYLATE 5 MG/ML
10 INJECTION INTRAMUSCULAR; INTRAVENOUS EVERY 6 HOURS PRN
OUTPATIENT
Start: 2024-08-19

## 2024-07-22 RX ORDER — DIPHENHYDRAMINE HCL 50 MG
50 CAPSULE ORAL ONCE
OUTPATIENT
Start: 2024-09-16

## 2024-07-22 RX ORDER — PROCHLORPERAZINE EDISYLATE 5 MG/ML
10 INJECTION INTRAMUSCULAR; INTRAVENOUS EVERY 6 HOURS PRN
OUTPATIENT
Start: 2024-09-17

## 2024-07-22 RX ORDER — EPINEPHRINE 0.3 MG/.3ML
0.3 INJECTION SUBCUTANEOUS EVERY 5 MIN PRN
OUTPATIENT
Start: 2024-09-18

## 2024-07-22 RX ORDER — PROCHLORPERAZINE EDISYLATE 5 MG/ML
10 INJECTION INTRAMUSCULAR; INTRAVENOUS EVERY 6 HOURS PRN
OUTPATIENT
Start: 2024-10-14

## 2024-07-22 RX ORDER — FAMOTIDINE 10 MG/ML
20 INJECTION INTRAVENOUS ONCE AS NEEDED
OUTPATIENT
Start: 2024-09-16

## 2024-07-22 RX ORDER — PROCHLORPERAZINE MALEATE 5 MG
10 TABLET ORAL EVERY 6 HOURS PRN
OUTPATIENT
Start: 2024-09-16

## 2024-07-22 RX ORDER — DIPHENHYDRAMINE HCL 25 MG
50 CAPSULE ORAL ONCE
Status: COMPLETED | OUTPATIENT
Start: 2024-07-22 | End: 2024-07-22

## 2024-07-22 RX ORDER — PALONOSETRON 0.05 MG/ML
0.25 INJECTION, SOLUTION INTRAVENOUS ONCE
OUTPATIENT
Start: 2024-08-19

## 2024-07-22 RX ORDER — FAMOTIDINE 10 MG/ML
20 INJECTION INTRAVENOUS ONCE AS NEEDED
OUTPATIENT
Start: 2024-08-21

## 2024-07-22 RX ORDER — PALONOSETRON 0.05 MG/ML
0.25 INJECTION, SOLUTION INTRAVENOUS ONCE
Status: COMPLETED | OUTPATIENT
Start: 2024-07-22 | End: 2024-07-22

## 2024-07-22 RX ORDER — PROCHLORPERAZINE MALEATE 5 MG
10 TABLET ORAL EVERY 6 HOURS PRN
OUTPATIENT
Start: 2024-08-19

## 2024-07-22 RX ORDER — EPINEPHRINE 0.3 MG/.3ML
0.3 INJECTION SUBCUTANEOUS EVERY 5 MIN PRN
OUTPATIENT
Start: 2024-10-14

## 2024-07-22 RX ORDER — ALBUTEROL SULFATE 0.83 MG/ML
3 SOLUTION RESPIRATORY (INHALATION) AS NEEDED
OUTPATIENT
Start: 2024-09-17

## 2024-07-22 SDOH — ECONOMIC STABILITY: FOOD INSECURITY: WITHIN THE PAST 12 MONTHS, THE FOOD YOU BOUGHT JUST DIDN'T LAST AND YOU DIDN'T HAVE MONEY TO GET MORE.: NEVER TRUE

## 2024-07-22 SDOH — ECONOMIC STABILITY: FOOD INSECURITY: WITHIN THE PAST 12 MONTHS, YOU WORRIED THAT YOUR FOOD WOULD RUN OUT BEFORE YOU GOT MONEY TO BUY MORE.: NEVER TRUE

## 2024-07-22 ASSESSMENT — PAIN SCALES - GENERAL: PAINLEVEL: 0-NO PAIN

## 2024-07-22 NOTE — PATIENT INSTRUCTIONS
Today you visited with your Oncologist.  Your recent labs were discussed and questions were answered.  Your current treatment regimen was discussed and the plan going forward was also discussed.  Scheduling orders were placed to reflect this conversation.  Please call our office for any questions that may arise after leaving today.   [phone]    Review your schedule upon leaving every infusion visit.  Remember you will no longer see lab visits noted separately on your schedule.    Dr Gleason will see you in 4 weeks with your next infusion. We have placed a referral for you to have a port placed as well as an appointment with Dr Henderson as discussed with Dr Gleason.

## 2024-07-22 NOTE — PROGRESS NOTES
Patient ID: Angel Garibay is a 84 y.o. male.  Referring Physician: Akila Gleason MD  [id number]  [place] [id number]  Primary Care Provider: Wilbur Jurado DO  Visit Type:  Follow Up     Subjective    HPI  83 year old gentleman with past medical history of HL, HTN, CAD, SVT, BPH, vit D deficiency, anxiety who is following for hx of low grade follicular lymphoma      Presented to evaluation in Nov/Dec 2018 with Dr. Nils Alejandre   noted small nodule in left neck; 2 lymph nodes were seen on ultrasound, one of which was excised showing and Low grade follicular non-hodgkin lymphoma.   11/13/2018 US neck  left submandibular space there is an enlarged hypoechoic lymph node with increased vascular flow and thickened cortex measuring approximately 2.3 x 1.7 x 2.4 cm in size. The cortex of the lymph node measures approximately 1.7 in thickness. Smaller lymph node more laterally measures approximately 1.6 x 1.2 x 1.3 cm in size  Left neck node excisional bx 11/27/18: low grade FL   12/24/18: CT neck: The left neck has multiple enlarged lymph nodes extending from level 1 B inferiorly to level 4.  12/24/18 CT C/A/P: Haziness of the mesenteric fat along the distribution of the SMA, with borderline lymph nodes, the appearance similar to slightly more apparent when compared to the prior examination of 08/14/2015.     Received weekly Rituximab x 4 from 2/7/19 to 2/28/19 8/5/19 CT C/A/P: no evidence of disease, no repeat CT neck but clinical response reported in notes      Followed with surveillance      11/20/23: CT neck: the parotid gland bilaterally measuring 2.2 x 2.0 cm on the right and 2.6 x 2.0 cm on the left. There are 2 nodules within the superficial lobe of the right parotid gland measuring 1.1 x 0.9 and 1.3 x 1.2 cm.  12/12/23: PET scan: 1. Right parotid gland with two superficial hypermetabolic soft tissue nodules and additional large hypermetabolic deep right parotid medial soft  tissue lesion. Hypermetabolic soft tissue density located  medial to the left parotid gland. Additional multiple bilateral cervical hypermetabolic lymphadenopathy. Findings are compatible with relapsed lymphoma.  2. Multiple subdiaphragmatic abdominal and pelvic hyper metabolic lymphadenopathy and multiple hypermetabolic abdominal and pelvic peritoneal implants, consistent with blessing and extranodal disease involvement.  3. Left kidney with irregular contour, consider further correlation with renal ultrasound.     12/14/23: LYMPH NODE, PAROTID, CORE BIOPSY:  -- CLASSIC FOLLICULAR LYMPHOMA (WHO 2022).  SEE NOTE.   -- FOLLICULAR LYMPHOMA, GRADE 1-2 (ICC 2022).  Review of Systems   Constitutional: Negative.    HENT:  Negative.     Eyes: Negative.    Respiratory: Negative.     Cardiovascular: Negative.         Objective   BSA: 2.34 meters squared  /77 (BP Location: Left arm, Patient Position: Sitting, BP Cuff Size: Adult)   Pulse 99   Temp 36.3 °C (97.3 °F) (Temporal)   Resp 16   Wt 111 kg (244 lb 14.9 oz)   SpO2 94%   BMI 35.14 kg/m²      has a past medical history of Anxiety, BPH (benign prostatic hyperplasia), CAD (coronary artery disease), HLD (hyperlipidemia), Lymphedema, Paroxysmal SVT (supraventricular tachycardia) (CMS-HCC), Personal history of non-Hodgkin lymphomas (10/07/2021), and S/P drug eluting coronary stent placement.   has a past surgical history that includes US guided biopsy lymph node superficial (12/14/2023); Coronary stent placement; Cardiac catheterization; and Cholecystectomy.  No family history on file.  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 He has never used smokeless tobacco.  He  reports no history of alcohol use.  He  reports no history of drug use.    Physical Exam  Constitutional:       Appearance: Normal appearance.   HENT:      Head: Normocephalic and atraumatic.   Eyes:      Extraocular Movements: Extraocular movements intact.      Pupils: Pupils are equal, round, and reactive to light.   Neurological:      Mental Status: He is alert.         WBC   Date/Time Value Ref Range Status   06/13/2024 10:03 AM 5.9 4.4 - 11.3 x10*3/uL Final   03/27/2024 09:17 AM 5.5 4.4 - 11.3 x10*3/uL Final   02/14/2024 09:21 AM 5.7 4.4 - 11.3 x10*3/uL Final     No results found for: \"NRBC\"  RBC   Date Value Ref Range Status   06/13/2024 4.30 (L) 4.50 - 5.90 x10*6/uL Final   03/27/2024 4.18 (L) 4.50 - 5.90 x10*6/uL Final   02/14/2024 4.47 (L) 4.50 - 5.90 x10*6/uL Final     Hemoglobin   Date Value Ref Range Status   06/13/2024 13.3 (L) 13.5 - 17.5 g/dL Final   03/27/2024 12.7 (L) 13.5 - 17.5 g/dL Final   02/14/2024 13.6 13.5 - 17.5 g/dL Final     Hematocrit   Date Value Ref Range Status   06/13/2024 41.6 41.0 - 52.0 % Final   03/27/2024 40.2 (L) 41.0 - 52.0 % Final   02/14/2024 42.5 41.0 - 52.0 % Final     MCV   Date/Time Value Ref Range Status   06/13/2024 10:03 AM 97 80 - 100 fL Final   03/27/2024 09:17 AM 96 80 - 100 fL Final   02/14/2024 09:21 AM 95 80 - 100 fL Final     MCH   Date/Time Value Ref Range Status   06/13/2024 10:03 AM 30.9 26.0 - 34.0 pg Final   03/27/2024 09:17 AM 30.4 26.0 - 34.0 pg Final   02/14/2024 09:21 AM 30.4 26.0 - 34.0 pg Final     MCHC   Date/Time Value Ref Range Status   06/13/2024 10:03 AM 32.0 32.0 - 36.0 g/dL Final   03/27/2024 09:17 AM 31.6 (L) 32.0 - 36.0 g/dL " "Final   02/14/2024 09:21 AM 32.0 32.0 - 36.0 g/dL Final     RDW   Date/Time Value Ref Range Status   06/13/2024 10:03 AM 12.5 11.5 - 14.5 % Final   03/27/2024 09:17 AM 12.8 11.5 - 14.5 % Final   02/14/2024 09:21 AM 12.8 11.5 - 14.5 % Final     Platelets   Date/Time Value Ref Range Status   06/13/2024 10:03  150 - 450 x10*3/uL Final   03/27/2024 09:17  150 - 450 x10*3/uL Final   02/14/2024 09:21  150 - 450 x10*3/uL Final     No results found for: \"MPV\"  Neutrophils %   Date/Time Value Ref Range Status   06/13/2024 10:03 AM 66.5 40.0 - 80.0 % Final   03/27/2024 09:17 AM 64.7 40.0 - 80.0 % Final   02/14/2024 09:21 AM 71.6 40.0 - 80.0 % Final     Immature Granulocytes %, Automated   Date/Time Value Ref Range Status   06/13/2024 10:03 AM 0.0 0.0 - 0.9 % Final     Comment:     Immature Granulocyte Count (IG) includes promyelocytes, myelocytes and metamyelocytes but does not include bands. Percent differential counts (%) should be interpreted in the context of the absolute cell counts (cells/UL).   03/27/2024 09:17 AM 0.0 0.0 - 0.9 % Final     Comment:     Immature Granulocyte Count (IG) includes promyelocytes, myelocytes and metamyelocytes but does not include bands. Percent differential counts (%) should be interpreted in the context of the absolute cell counts (cells/UL).   02/14/2024 09:21 AM 0.2 0.0 - 0.9 % Final     Comment:     Immature Granulocyte Count (IG) includes promyelocytes, myelocytes and metamyelocytes but does not include bands. Percent differential counts (%) should be interpreted in the context of the absolute cell counts (cells/UL).     Lymphocytes %   Date/Time Value Ref Range Status   06/13/2024 10:03 AM 19.8 13.0 - 44.0 % Final   03/27/2024 09:17 AM 20.4 13.0 - 44.0 % Final   02/14/2024 09:21 AM 16.2 13.0 - 44.0 % Final     Monocytes %   Date/Time Value Ref Range Status   06/13/2024 10:03 AM 9.3 2.0 - 10.0 % Final   03/27/2024 09:17 AM 10.6 2.0 - 10.0 % Final   02/14/2024 09:21 AM " 8.5 2.0 - 10.0 % Final     Eosinophils %   Date/Time Value Ref Range Status   06/13/2024 10:03 AM 3.7 0.0 - 6.0 % Final   03/27/2024 09:17 AM 3.6 0.0 - 6.0 % Final   02/14/2024 09:21 AM 3.0 0.0 - 6.0 % Final     Basophils %   Date/Time Value Ref Range Status   06/13/2024 10:03 AM 0.7 0.0 - 2.0 % Final   03/27/2024 09:17 AM 0.7 0.0 - 2.0 % Final   02/14/2024 09:21 AM 0.5 0.0 - 2.0 % Final     Neutrophils Absolute   Date/Time Value Ref Range Status   06/13/2024 10:03 AM 3.93 1.60 - 5.50 x10*3/uL Final     Comment:     Percent differential counts (%) should be interpreted in the context of the absolute cell counts (cells/uL).   03/27/2024 09:17 AM 3.58 1.60 - 5.50 x10*3/uL Final     Comment:     Percent differential counts (%) should be interpreted in the context of the absolute cell counts (cells/uL).   02/14/2024 09:21 AM 4.11 1.60 - 5.50 x10*3/uL Final     Comment:     Percent differential counts (%) should be interpreted in the context of the absolute cell counts (cells/uL).     Immature Granulocytes Absolute, Automated   Date/Time Value Ref Range Status   06/13/2024 10:03 AM 0.00 0.00 - 0.50 x10*3/uL Final   03/27/2024 09:17 AM 0.00 0.00 - 0.50 x10*3/uL Final   02/14/2024 09:21 AM 0.01 0.00 - 0.50 x10*3/uL Final     Lymphocytes Absolute   Date/Time Value Ref Range Status   06/13/2024 10:03 AM 1.17 0.80 - 3.00 x10*3/uL Final   03/27/2024 09:17 AM 1.13 0.80 - 3.00 x10*3/uL Final   02/14/2024 09:21 AM 0.93 0.80 - 3.00 x10*3/uL Final     Monocytes Absolute   Date/Time Value Ref Range Status   06/13/2024 10:03 AM 0.55 0.05 - 0.80 x10*3/uL Final   03/27/2024 09:17 AM 0.59 0.05 - 0.80 x10*3/uL Final   02/14/2024 09:21 AM 0.49 0.05 - 0.80 x10*3/uL Final     Eosinophils Absolute   Date/Time Value Ref Range Status   06/13/2024 10:03 AM 0.22 0.00 - 0.40 x10*3/uL Final   03/27/2024 09:17 AM 0.20 0.00 - 0.40 x10*3/uL Final   02/14/2024 09:21 AM 0.17 0.00 - 0.40 x10*3/uL Final     Basophils Absolute   Date/Time Value Ref Range  Status   06/13/2024 10:03 AM 0.04 0.00 - 0.10 x10*3/uL Final   03/27/2024 09:17 AM 0.04 0.00 - 0.10 x10*3/uL Final   02/14/2024 09:21 AM 0.03 0.00 - 0.10 x10*3/uL Final       Assessment/Plan      Recurrent Follicular Lymphoma      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 s/p Rituxan x 4 weekly doses in 2018   Now with evidence of widespread recurrence although not much symptomatic   Due to multiple areas of disease particularly in the abdomen/peritoneum at risk of complications would recommend treatment rather than surveillance at this time      We discussed re-treatment with Rituxan or Obinutuzumab vs anti-CD20 + Revlimid or Bendamustine, we discussed the pros and cons of each, he leans towards doing less and even considering not doing treatment      Recommended in this case that we do at least a repeat Rituximab course, he will think about it and let us know      1/24/2024-we discussed his options again and he expressed desire to proceed with treatment, we discussed his therapy options again including rituximab plus or minus additional agents  After our discussion the plan was to proceed with rituximab and reserve additional therapy in case of no response  Will plan for 4 weekly doses of Rituxan followed by restaging PET scan 1 month after completion at which point we will also discuss maintenance treatment     2/21/2024-status post 4 doses of weekly rituximab with good tolerance  We will follow-up restaging PET scan in around a month  If there is any concern we will consider biopsying the parotid gland as the original SUV was high to rule out any transformation  If there is response then we will discuss maintenance rituximab     3/27- PET scan after rituxan showed progressive disease and increased avidity   We will send to ENT for excisional LN biopsy r/o large cell transformation   Depending on biopsy we can determine treatment options for high vs low grade lymphoma regimen, he  is also considering no treatment         4/18- s/p biopsy, the result is pending, will ask for RTC once the result is available, meantime ask for post op ENT check up     6/13-last appointment patient had deferred starting any treatment for his follicular lymphoma, on discussion today he again expressed that he does not think chemotherapy may be worth it but if it will provide significant survival benefit he will consider, I provided him again today with information on Bendamustine which he is now considering.    7/22/2024: Patient presenting today to start treatment with Bendamustine plus Obinutuzumab for recurrent follicular cell lymphoma.  All questions asked and answered.  Scheduled for Mediport placement.     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 (Multi)  -     Clinic Appointment Request  -     Referral to Physical Medicine Rehab; Future  -     Consult to Interventional Radiology; Future  -     Clinic Appointment Request; Future  -     Infusion Appointment Request; Future  -     CBC and Auto Differential; Future  -     Comprehensive metabolic panel; Future  -     Infusion Appointment Request; Future  -     Infusion Appointment Request; Future  -     Infusion Appointment Request; Future  -     CBC and Auto Differential; Future  -     Comprehensive metabolic panel; Future  -     Infusion Appointment Request; Future  -     Infusion Appointment Request; Future  -     Clinic Appointment Request; Future  -     Infusion Appointment Request; Future  -     CBC and Auto Differential; Future  -     Comprehensive metabolic panel; Future  -     Infusion Appointment Request; Future  -     Infusion Appointment Request; Future  -     Infusion Appointment Request; Future  -     CBC and Auto Differential; Future  -     Comprehensive metabolic panel; Future  -     Infusion Appointment Request; Future  -     Infusion Appointment Request; Future  Grade 2 follicular  lymphoma of lymph nodes of multiple regions (Multi)  -     Clinic Appointment Request  -     Referral to Physical Medicine Rehab; Future  -     Consult to Interventional Radiology; Future  -     Clinic Appointment Request; Future  -     Infusion Appointment Request; Future  -     CBC and Auto Differential; Future  -     Comprehensive metabolic panel; Future  -     Infusion Appointment Request; Future  -     Infusion Appointment Request; Future  -     Infusion Appointment Request; Future  -     CBC and Auto Differential; Future  -     Comprehensive metabolic panel; Future  -     Infusion Appointment Request; Future  -     Infusion Appointment Request; Future  -     Clinic Appointment Request; Future  -     Infusion Appointment Request; Future  -     CBC and Auto Differential; Future  -     Comprehensive metabolic panel; Future  -     Infusion Appointment Request; Future  -     Infusion Appointment Request; Future  -     Infusion Appointment Request; Future  -     CBC and Auto Differential; Future  -     Comprehensive metabolic panel; Future  -     Infusion Appointment Request; Future  -     Infusion Appointment Request; Future  Other orders  -     acetaminophen (Tylenol) tablet 650 mg  -     diphenhydrAMINE (BENADryl) capsule 50 mg  -     dexAMETHasone (Decadron) 20 mg in dextrose 5% 50 mL IV  -     palonosetron (Aloxi) injection 250 mcg  -     prochlorperazine (Compazine) tablet 10 mg  -     prochlorperazine (Compazine) injection 10 mg  -     bendamustine (Bendeka) 212.5 mg in sodium chloride 0.9% 58.5 mL IV  -     obinutuzumab (Gazyva) 1,000 mg in sodium chloride 0.9% 290 mL IV  -     sodium chloride 0.9 % bolus 500 mL  -     dextrose 5 % in water (D5W) bolus  -     diphenhydrAMINE (BENADryl) injection 50 mg  -     methylPREDNISolone sod succinate (SOLU-Medrol) 40 mg/mL injection 40 mg  -     famotidine PF (Pepcid) injection 20 mg  -     EPINEPHrine (Epipen) injection syringe 0.3 mg  -     albuterol 2.5 mg /3 mL  (0.083 %) nebulizer solution 3 mL  -     dexAMETHasone (Decadron) 12 mg in dextrose 5% 50 mL IV  -     prochlorperazine (Compazine) tablet 10 mg  -     prochlorperazine (Compazine) injection 10 mg  -     bendamustine (Bendeka) 212.5 mg in sodium chloride 0.9% 58.5 mL IV  -     sodium chloride 0.9 % bolus 500 mL  -     dextrose 5 % in water (D5W) bolus  -     diphenhydrAMINE (BENADryl) injection 50 mg  -     methylPREDNISolone sod succinate (SOLU-Medrol) 40 mg/mL injection 40 mg  -     famotidine PF (Pepcid) injection 20 mg  -     EPINEPHrine (Epipen) injection syringe 0.3 mg  -     albuterol 2.5 mg /3 mL (0.083 %) nebulizer solution 3 mL  -     pegfilgrastim-cbqv (Udenyca) injection 6 mg  -     sodium chloride 0.9 % bolus 500 mL  -     dextrose 5 % in water (D5W) bolus  -     diphenhydrAMINE (BENADryl) injection 50 mg  -     methylPREDNISolone sod succinate (SOLU-Medrol) 40 mg/mL injection 40 mg  -     famotidine PF (Pepcid) injection 20 mg  -     EPINEPHrine (Epipen) injection syringe 0.3 mg  -     albuterol 2.5 mg /3 mL (0.083 %) nebulizer solution 3 mL  -     acetaminophen (Tylenol) tablet 650 mg  -     diphenhydrAMINE (BENADryl) capsule 50 mg  -     dexAMETHasone (Decadron) 20 mg in dextrose 5% 50 mL IV  -     palonosetron (Aloxi) injection 250 mcg  -     prochlorperazine (Compazine) tablet 10 mg  -     prochlorperazine (Compazine) injection 10 mg  -     bendamustine (Bendeka) 212.5 mg in sodium chloride 0.9% 58.5 mL IV  -     obinutuzumab (Gazyva) 1,000 mg in sodium chloride 0.9% 290 mL IV  -     sodium chloride 0.9 % bolus 500 mL  -     dextrose 5 % in water (D5W) bolus  -     diphenhydrAMINE (BENADryl) injection 50 mg  -     methylPREDNISolone sod succinate (SOLU-Medrol) 40 mg/mL injection 40 mg  -     famotidine PF (Pepcid) injection 20 mg  -     EPINEPHrine (Epipen) injection syringe 0.3 mg  -     albuterol 2.5 mg /3 mL (0.083 %) nebulizer solution 3 mL  -     dexAMETHasone (Decadron) 12 mg in dextrose 5%  50 mL IV  -     prochlorperazine (Compazine) tablet 10 mg  -     prochlorperazine (Compazine) injection 10 mg  -     bendamustine (Bendeka) 212.5 mg in sodium chloride 0.9% 58.5 mL IV  -     sodium chloride 0.9 % bolus 500 mL  -     dextrose 5 % in water (D5W) bolus  -     diphenhydrAMINE (BENADryl) injection 50 mg  -     methylPREDNISolone sod succinate (SOLU-Medrol) 40 mg/mL injection 40 mg  -     famotidine PF (Pepcid) injection 20 mg  -     EPINEPHrine (Epipen) injection syringe 0.3 mg  -     albuterol 2.5 mg /3 mL (0.083 %) nebulizer solution 3 mL  -     pegfilgrastim-cbqv (Udenyca) injection 6 mg  -     sodium chloride 0.9 % bolus 500 mL  -     dextrose 5 % in water (D5W) bolus  -     diphenhydrAMINE (BENADryl) injection 50 mg  -     methylPREDNISolone sod succinate (SOLU-Medrol) 40 mg/mL injection 40 mg  -     famotidine PF (Pepcid) injection 20 mg  -     EPINEPHrine (Epipen) injection syringe 0.3 mg  -     albuterol 2.5 mg /3 mL (0.083 %) nebulizer solution 3 mL  -     acetaminophen (Tylenol) tablet 650 mg  -     diphenhydrAMINE (BENADryl) capsule 50 mg  -     dexAMETHasone (Decadron) 20 mg in dextrose 5% 50 mL IV  -     palonosetron (Aloxi) injection 250 mcg  -     prochlorperazine (Compazine) tablet 10 mg  -     prochlorperazine (Compazine) injection 10 mg  -     bendamustine (Bendeka) 212.5 mg in sodium chloride 0.9% 58.5 mL IV  -     obinutuzumab (Gazyva) 1,000 mg in sodium chloride 0.9% 290 mL IV  -     sodium chloride 0.9 % bolus 500 mL  -     dextrose 5 % in water (D5W) bolus  -     diphenhydrAMINE (BENADryl) injection 50 mg  -     methylPREDNISolone sod succinate (SOLU-Medrol) 40 mg/mL injection 40 mg  -     famotidine PF (Pepcid) injection 20 mg  -     EPINEPHrine (Epipen) injection syringe 0.3 mg  -     albuterol 2.5 mg /3 mL (0.083 %) nebulizer solution 3 mL  -     dexAMETHasone (Decadron) 12 mg in dextrose 5% 50 mL IV  -     prochlorperazine (Compazine) tablet 10 mg  -     prochlorperazine  (Compazine) injection 10 mg  -     bendamustine (Bendeka) 212.5 mg in sodium chloride 0.9% 58.5 mL IV  -     sodium chloride 0.9 % bolus 500 mL  -     dextrose 5 % in water (D5W) bolus  -     diphenhydrAMINE (BENADryl) injection 50 mg  -     methylPREDNISolone sod succinate (SOLU-Medrol) 40 mg/mL injection 40 mg  -     famotidine PF (Pepcid) injection 20 mg  -     EPINEPHrine (Epipen) injection syringe 0.3 mg  -     albuterol 2.5 mg /3 mL (0.083 %) nebulizer solution 3 mL  -     pegfilgrastim-cbqv (Udenyca) injection 6 mg  -     sodium chloride 0.9 % bolus 500 mL  -     dextrose 5 % in water (D5W) bolus  -     diphenhydrAMINE (BENADryl) injection 50 mg  -     methylPREDNISolone sod succinate (SOLU-Medrol) 40 mg/mL injection 40 mg  -     famotidine PF (Pepcid) injection 20 mg  -     EPINEPHrine (Epipen) injection syringe 0.3 mg  -     albuterol 2.5 mg /3 mL (0.083 %) nebulizer solution 3 mL  -     acetaminophen (Tylenol) tablet 650 mg  -     diphenhydrAMINE (BENADryl) capsule 50 mg  -     dexAMETHasone (Decadron) 20 mg in dextrose 5% 50 mL IV  -     palonosetron (Aloxi) injection 250 mcg  -     prochlorperazine (Compazine) tablet 10 mg  -     prochlorperazine (Compazine) injection 10 mg  -     bendamustine (Bendeka) 212.5 mg in sodium chloride 0.9% 58.5 mL IV  -     obinutuzumab (Gazyva) 1,000 mg in sodium chloride 0.9% 290 mL IV  -     sodium chloride 0.9 % bolus 500 mL  -     dextrose 5 % in water (D5W) bolus  -     diphenhydrAMINE (BENADryl) injection 50 mg  -     methylPREDNISolone sod succinate (SOLU-Medrol) 40 mg/mL injection 40 mg  -     famotidine PF (Pepcid) injection 20 mg  -     EPINEPHrine (Epipen) injection syringe 0.3 mg  -     albuterol 2.5 mg /3 mL (0.083 %) nebulizer solution 3 mL  -     dexAMETHasone (Decadron) 12 mg in dextrose 5% 50 mL IV  -     prochlorperazine (Compazine) tablet 10 mg  -     prochlorperazine (Compazine) injection 10 mg  -     bendamustine (Bendeka) 212.5 mg in sodium chloride  0.9% 58.5 mL IV  -     sodium chloride 0.9 % bolus 500 mL  -     dextrose 5 % in water (D5W) bolus  -     diphenhydrAMINE (BENADryl) injection 50 mg  -     methylPREDNISolone sod succinate (SOLU-Medrol) 40 mg/mL injection 40 mg  -     famotidine PF (Pepcid) injection 20 mg  -     EPINEPHrine (Epipen) injection syringe 0.3 mg  -     albuterol 2.5 mg /3 mL (0.083 %) nebulizer solution 3 mL  -     pegfilgrastim-cbqv (Udenyca) injection 6 mg  -     sodium chloride 0.9 % bolus 500 mL  -     dextrose 5 % in water (D5W) bolus  -     diphenhydrAMINE (BENADryl) injection 50 mg  -     methylPREDNISolone sod succinate (SOLU-Medrol) 40 mg/mL injection 40 mg  -     famotidine PF (Pepcid) injection 20 mg  -     EPINEPHrine (Epipen) injection syringe 0.3 mg  -     albuterol 2.5 mg /3 mL (0.083 %) nebulizer solution 3 mL           Akila Gleason MD

## 2024-07-22 NOTE — PROGRESS NOTES
Obinutuzumab Infusion Rates:      Rate  Volume     15      7.5     30      15     46      23     61     30.5     76      38     92      46    107     53.5    122     93.5

## 2024-07-23 ENCOUNTER — INFUSION (OUTPATIENT)
Dept: HEMATOLOGY/ONCOLOGY | Facility: CLINIC | Age: 84
End: 2024-07-23
Payer: MEDICARE

## 2024-07-23 ENCOUNTER — TELEPHONE (OUTPATIENT)
Dept: HEMATOLOGY/ONCOLOGY | Facility: CLINIC | Age: 84
End: 2024-07-23

## 2024-07-23 VITALS
SYSTOLIC BLOOD PRESSURE: 121 MMHG | DIASTOLIC BLOOD PRESSURE: 62 MMHG | OXYGEN SATURATION: 96 % | TEMPERATURE: 98.1 F | RESPIRATION RATE: 17 BRPM | BODY MASS INDEX: 35.3 KG/M2 | HEART RATE: 83 BPM | WEIGHT: 246.03 LBS

## 2024-07-23 DIAGNOSIS — C82.18 GRADE 2 FOLLICULAR LYMPHOMA OF LYMPH NODES OF MULTIPLE REGIONS (MULTI): ICD-10-CM

## 2024-07-23 PROCEDURE — 2500000004 HC RX 250 GENERAL PHARMACY W/ HCPCS (ALT 636 FOR OP/ED): Performed by: INTERNAL MEDICINE

## 2024-07-23 PROCEDURE — 96409 CHEMO IV PUSH SNGL DRUG: CPT

## 2024-07-23 PROCEDURE — 96375 TX/PRO/DX INJ NEW DRUG ADDON: CPT | Mod: INF

## 2024-07-23 RX ORDER — ALBUTEROL SULFATE 0.83 MG/ML
3 SOLUTION RESPIRATORY (INHALATION) AS NEEDED
Status: DISCONTINUED | OUTPATIENT
Start: 2024-07-23 | End: 2024-07-23 | Stop reason: HOSPADM

## 2024-07-23 RX ORDER — DIPHENHYDRAMINE HYDROCHLORIDE 50 MG/ML
50 INJECTION INTRAMUSCULAR; INTRAVENOUS AS NEEDED
Status: DISCONTINUED | OUTPATIENT
Start: 2024-07-23 | End: 2024-07-23 | Stop reason: HOSPADM

## 2024-07-23 RX ORDER — PROCHLORPERAZINE MALEATE 10 MG
10 TABLET ORAL EVERY 6 HOURS PRN
Status: DISCONTINUED | OUTPATIENT
Start: 2024-07-23 | End: 2024-07-23 | Stop reason: HOSPADM

## 2024-07-23 RX ORDER — PROCHLORPERAZINE EDISYLATE 5 MG/ML
10 INJECTION INTRAMUSCULAR; INTRAVENOUS EVERY 6 HOURS PRN
Status: DISCONTINUED | OUTPATIENT
Start: 2024-07-23 | End: 2024-07-23 | Stop reason: HOSPADM

## 2024-07-23 RX ORDER — HEPARIN SODIUM,PORCINE/PF 10 UNIT/ML
50 SYRINGE (ML) INTRAVENOUS AS NEEDED
OUTPATIENT
Start: 2024-07-23

## 2024-07-23 RX ORDER — EPINEPHRINE 0.3 MG/.3ML
0.3 INJECTION SUBCUTANEOUS EVERY 5 MIN PRN
Status: DISCONTINUED | OUTPATIENT
Start: 2024-07-23 | End: 2024-07-23 | Stop reason: HOSPADM

## 2024-07-23 RX ORDER — FAMOTIDINE 10 MG/ML
20 INJECTION INTRAVENOUS ONCE AS NEEDED
Status: DISCONTINUED | OUTPATIENT
Start: 2024-07-23 | End: 2024-07-23 | Stop reason: HOSPADM

## 2024-07-23 RX ORDER — HEPARIN 100 UNIT/ML
500 SYRINGE INTRAVENOUS AS NEEDED
OUTPATIENT
Start: 2024-07-23

## 2024-07-23 ASSESSMENT — PAIN SCALES - GENERAL: PAINLEVEL: 0-NO PAIN

## 2024-07-24 ENCOUNTER — INFUSION (OUTPATIENT)
Dept: HEMATOLOGY/ONCOLOGY | Facility: CLINIC | Age: 84
End: 2024-07-24
Payer: MEDICARE

## 2024-07-24 VITALS
SYSTOLIC BLOOD PRESSURE: 147 MMHG | OXYGEN SATURATION: 94 % | BODY MASS INDEX: 35.59 KG/M2 | HEART RATE: 75 BPM | RESPIRATION RATE: 17 BRPM | DIASTOLIC BLOOD PRESSURE: 69 MMHG | WEIGHT: 248.02 LBS | TEMPERATURE: 97 F

## 2024-07-24 DIAGNOSIS — C82.18 GRADE 2 FOLLICULAR LYMPHOMA OF LYMPH NODES OF MULTIPLE REGIONS (MULTI): Primary | ICD-10-CM

## 2024-07-24 DIAGNOSIS — C82.18 GRADE 2 FOLLICULAR LYMPHOMA OF LYMPH NODES OF MULTIPLE REGIONS (MULTI): ICD-10-CM

## 2024-07-24 PROCEDURE — 96372 THER/PROPH/DIAG INJ SC/IM: CPT

## 2024-07-24 PROCEDURE — 2500000004 HC RX 250 GENERAL PHARMACY W/ HCPCS (ALT 636 FOR OP/ED): Mod: JZ,JG | Performed by: INTERNAL MEDICINE

## 2024-07-24 RX ORDER — ONDANSETRON HYDROCHLORIDE 8 MG/1
8 TABLET, FILM COATED ORAL 2 TIMES DAILY PRN
Qty: 30 TABLET | Refills: 3 | Status: SHIPPED | OUTPATIENT
Start: 2024-07-24 | End: 2024-08-23

## 2024-07-24 ASSESSMENT — ENCOUNTER SYMPTOMS
RESPIRATORY NEGATIVE: 1
EYES NEGATIVE: 1
CARDIOVASCULAR NEGATIVE: 1
CONSTITUTIONAL NEGATIVE: 1

## 2024-07-24 ASSESSMENT — PAIN SCALES - GENERAL: PAINLEVEL: 0-NO PAIN

## 2024-07-24 NOTE — PROGRESS NOTES
1200. Udenyca subcutaneous injection tolerated without incident. His schedule was reviewed then he was Dc'd via independent / ambulatory

## 2024-07-29 ENCOUNTER — SOCIAL WORK (OUTPATIENT)
Dept: CASE MANAGEMENT | Facility: HOSPITAL | Age: 84
End: 2024-07-29
Payer: MEDICARE

## 2024-07-29 ENCOUNTER — INFUSION (OUTPATIENT)
Dept: HEMATOLOGY/ONCOLOGY | Facility: CLINIC | Age: 84
End: 2024-07-29
Payer: MEDICARE

## 2024-07-29 VITALS
RESPIRATION RATE: 16 BRPM | TEMPERATURE: 98.2 F | OXYGEN SATURATION: 95 % | WEIGHT: 244.38 LBS | BODY MASS INDEX: 35.06 KG/M2 | HEART RATE: 71 BPM | DIASTOLIC BLOOD PRESSURE: 71 MMHG | SYSTOLIC BLOOD PRESSURE: 120 MMHG

## 2024-07-29 DIAGNOSIS — C82.18 GRADE 2 FOLLICULAR LYMPHOMA OF LYMPH NODES OF MULTIPLE REGIONS (MULTI): ICD-10-CM

## 2024-07-29 LAB
ALBUMIN SERPL BCP-MCNC: 3.7 G/DL (ref 3.4–5)
ALP SERPL-CCNC: 217 U/L (ref 33–136)
ALT SERPL W P-5'-P-CCNC: 24 U/L (ref 10–52)
ANION GAP SERPL CALC-SCNC: 14 MMOL/L (ref 10–20)
AST SERPL W P-5'-P-CCNC: 26 U/L (ref 9–39)
BASOPHILS # BLD AUTO: 0.06 X10*3/UL (ref 0–0.1)
BASOPHILS NFR BLD AUTO: 0.3 %
BILIRUB SERPL-MCNC: 0.4 MG/DL (ref 0–1.2)
BUN SERPL-MCNC: 18 MG/DL (ref 6–23)
CALCIUM SERPL-MCNC: 9 MG/DL (ref 8.6–10.3)
CHLORIDE SERPL-SCNC: 102 MMOL/L (ref 98–107)
CO2 SERPL-SCNC: 29 MMOL/L (ref 21–32)
CREAT SERPL-MCNC: 1.17 MG/DL (ref 0.5–1.3)
EGFRCR SERPLBLD CKD-EPI 2021: 61 ML/MIN/1.73M*2
EOSINOPHIL # BLD AUTO: 0.51 X10*3/UL (ref 0–0.4)
EOSINOPHIL NFR BLD AUTO: 2.5 %
ERYTHROCYTE [DISTWIDTH] IN BLOOD BY AUTOMATED COUNT: 13 % (ref 11.5–14.5)
GLUCOSE SERPL-MCNC: 119 MG/DL (ref 74–99)
HCT VFR BLD AUTO: 38.9 % (ref 41–52)
HGB BLD-MCNC: 12.1 G/DL (ref 13.5–17.5)
IMM GRANULOCYTES # BLD AUTO: 0.55 X10*3/UL (ref 0–0.5)
IMM GRANULOCYTES NFR BLD AUTO: 2.7 % (ref 0–0.9)
LYMPHOCYTES # BLD AUTO: 0.21 X10*3/UL (ref 0.8–3)
LYMPHOCYTES NFR BLD AUTO: 1 %
MCH RBC QN AUTO: 30.6 PG (ref 26–34)
MCHC RBC AUTO-ENTMCNC: 31.1 G/DL (ref 32–36)
MCV RBC AUTO: 98 FL (ref 80–100)
MONOCYTES # BLD AUTO: 2.2 X10*3/UL (ref 0.05–0.8)
MONOCYTES NFR BLD AUTO: 10.7 %
NEUTROPHILS # BLD AUTO: 16.95 X10*3/UL (ref 1.6–5.5)
NEUTROPHILS NFR BLD AUTO: 82.8 %
NRBC BLD-RTO: ABNORMAL /100{WBCS}
PLATELET # BLD AUTO: 176 X10*3/UL (ref 150–450)
POTASSIUM SERPL-SCNC: 4.3 MMOL/L (ref 3.5–5.3)
PROT SERPL-MCNC: 6.8 G/DL (ref 6.4–8.2)
RBC # BLD AUTO: 3.96 X10*6/UL (ref 4.5–5.9)
RBC MORPH BLD: NORMAL
SODIUM SERPL-SCNC: 141 MMOL/L (ref 136–145)
WBC # BLD AUTO: 20.5 X10*3/UL (ref 4.4–11.3)

## 2024-07-29 PROCEDURE — 96415 CHEMO IV INFUSION ADDL HR: CPT

## 2024-07-29 PROCEDURE — 2500000001 HC RX 250 WO HCPCS SELF ADMINISTERED DRUGS (ALT 637 FOR MEDICARE OP): Performed by: INTERNAL MEDICINE

## 2024-07-29 PROCEDURE — 80053 COMPREHEN METABOLIC PANEL: CPT | Performed by: INTERNAL MEDICINE

## 2024-07-29 PROCEDURE — 96375 TX/PRO/DX INJ NEW DRUG ADDON: CPT | Mod: INF

## 2024-07-29 PROCEDURE — 2500000004 HC RX 250 GENERAL PHARMACY W/ HCPCS (ALT 636 FOR OP/ED): Mod: JZ,JG | Performed by: INTERNAL MEDICINE

## 2024-07-29 PROCEDURE — 96413 CHEMO IV INFUSION 1 HR: CPT

## 2024-07-29 PROCEDURE — 85025 COMPLETE CBC W/AUTO DIFF WBC: CPT | Performed by: INTERNAL MEDICINE

## 2024-07-29 RX ORDER — ACETAMINOPHEN 325 MG/1
650 TABLET ORAL ONCE
Status: COMPLETED | OUTPATIENT
Start: 2024-07-29 | End: 2024-07-29

## 2024-07-29 RX ORDER — HEPARIN SODIUM,PORCINE/PF 10 UNIT/ML
50 SYRINGE (ML) INTRAVENOUS AS NEEDED
OUTPATIENT
Start: 2024-07-29

## 2024-07-29 RX ORDER — EPINEPHRINE 0.3 MG/.3ML
0.3 INJECTION SUBCUTANEOUS EVERY 5 MIN PRN
Status: DISCONTINUED | OUTPATIENT
Start: 2024-07-29 | End: 2024-07-29 | Stop reason: HOSPADM

## 2024-07-29 RX ORDER — DIPHENHYDRAMINE HYDROCHLORIDE 50 MG/ML
50 INJECTION INTRAMUSCULAR; INTRAVENOUS AS NEEDED
Status: DISCONTINUED | OUTPATIENT
Start: 2024-07-29 | End: 2024-07-29 | Stop reason: HOSPADM

## 2024-07-29 RX ORDER — DIPHENHYDRAMINE HCL 25 MG
50 CAPSULE ORAL ONCE
Status: COMPLETED | OUTPATIENT
Start: 2024-07-29 | End: 2024-07-29

## 2024-07-29 RX ORDER — ALBUTEROL SULFATE 0.83 MG/ML
3 SOLUTION RESPIRATORY (INHALATION) AS NEEDED
Status: DISCONTINUED | OUTPATIENT
Start: 2024-07-29 | End: 2024-07-29 | Stop reason: HOSPADM

## 2024-07-29 RX ORDER — FAMOTIDINE 10 MG/ML
20 INJECTION INTRAVENOUS ONCE AS NEEDED
Status: DISCONTINUED | OUTPATIENT
Start: 2024-07-29 | End: 2024-07-29 | Stop reason: HOSPADM

## 2024-07-29 RX ORDER — DEXAMETHASONE IN 0.9 % SOD CHL 20 MG/50ML
20 INTRAVENOUS SOLUTION, PIGGYBACK (ML) INTRAVENOUS ONCE
Status: COMPLETED | OUTPATIENT
Start: 2024-07-29 | End: 2024-07-29

## 2024-07-29 RX ORDER — HEPARIN 100 UNIT/ML
500 SYRINGE INTRAVENOUS AS NEEDED
OUTPATIENT
Start: 2024-07-29

## 2024-07-29 ASSESSMENT — PAIN SCALES - GENERAL: PAINLEVEL: 7

## 2024-07-29 NOTE — SIGNIFICANT EVENT

## 2024-07-29 NOTE — PROGRESS NOTES
1420. Infusion tolerated without incident. VS monitored throughout. Schedule reviewed then Dc'd via independent / ambulatory

## 2024-07-29 NOTE — PROGRESS NOTES
Received email from AURELIO Carmichael asking for writer to give pt her phone number as she was having a hard time connecting with pt regarding possible grants to help cover his infusion meds. Met with pt in infusion room and provided him with Amol's contact info. Pt discussed his insurance coverage and how he may change it come open-enrollment in November. Pt denied any other SW needs at this time. Encouraged pt to reach out should additional questions or concerns arise.   Carmen Miranda, MSW, LSW

## 2024-07-29 NOTE — PROGRESS NOTES
Obinutuzumab Infusion Rates    (100mg)  31cc / 15.5 vol    (200mg)  61c / 31 vol    (300mg)  92cc / 46 vol    (400mg)  123cc / 215 vol

## 2024-08-05 ENCOUNTER — INFUSION (OUTPATIENT)
Dept: HEMATOLOGY/ONCOLOGY | Facility: CLINIC | Age: 84
End: 2024-08-05
Payer: MEDICARE

## 2024-08-05 VITALS
DIASTOLIC BLOOD PRESSURE: 69 MMHG | TEMPERATURE: 98.1 F | OXYGEN SATURATION: 97 % | SYSTOLIC BLOOD PRESSURE: 121 MMHG | BODY MASS INDEX: 34.65 KG/M2 | RESPIRATION RATE: 17 BRPM | HEART RATE: 67 BPM | WEIGHT: 241.51 LBS

## 2024-08-05 DIAGNOSIS — C82.18 GRADE 2 FOLLICULAR LYMPHOMA OF LYMPH NODES OF MULTIPLE REGIONS (MULTI): ICD-10-CM

## 2024-08-05 LAB
ALBUMIN SERPL BCP-MCNC: 3.7 G/DL (ref 3.4–5)
ALP SERPL-CCNC: 170 U/L (ref 33–136)
ALT SERPL W P-5'-P-CCNC: 21 U/L (ref 10–52)
ANION GAP SERPL CALC-SCNC: 15 MMOL/L (ref 10–20)
AST SERPL W P-5'-P-CCNC: 17 U/L (ref 9–39)
BASOPHILS # BLD AUTO: 0.05 X10*3/UL (ref 0–0.1)
BASOPHILS NFR BLD AUTO: 0.5 %
BILIRUB SERPL-MCNC: 0.8 MG/DL (ref 0–1.2)
BUN SERPL-MCNC: 17 MG/DL (ref 6–23)
CALCIUM SERPL-MCNC: 9 MG/DL (ref 8.6–10.3)
CHLORIDE SERPL-SCNC: 102 MMOL/L (ref 98–107)
CO2 SERPL-SCNC: 26 MMOL/L (ref 21–32)
CREAT SERPL-MCNC: 0.95 MG/DL (ref 0.5–1.3)
EGFRCR SERPLBLD CKD-EPI 2021: 78 ML/MIN/1.73M*2
EOSINOPHIL # BLD AUTO: 0.35 X10*3/UL (ref 0–0.4)
EOSINOPHIL NFR BLD AUTO: 3.6 %
ERYTHROCYTE [DISTWIDTH] IN BLOOD BY AUTOMATED COUNT: 12.9 % (ref 11.5–14.5)
GLUCOSE SERPL-MCNC: 104 MG/DL (ref 74–99)
HCT VFR BLD AUTO: 36.7 % (ref 41–52)
HGB BLD-MCNC: 11.6 G/DL (ref 13.5–17.5)
IMM GRANULOCYTES # BLD AUTO: 0.04 X10*3/UL (ref 0–0.5)
IMM GRANULOCYTES NFR BLD AUTO: 0.4 % (ref 0–0.9)
LYMPHOCYTES # BLD AUTO: 0.42 X10*3/UL (ref 0.8–3)
LYMPHOCYTES NFR BLD AUTO: 4.3 %
MCH RBC QN AUTO: 31.1 PG (ref 26–34)
MCHC RBC AUTO-ENTMCNC: 31.6 G/DL (ref 32–36)
MCV RBC AUTO: 98 FL (ref 80–100)
MONOCYTES # BLD AUTO: 0.84 X10*3/UL (ref 0.05–0.8)
MONOCYTES NFR BLD AUTO: 8.7 %
NEUTROPHILS # BLD AUTO: 7.96 X10*3/UL (ref 1.6–5.5)
NEUTROPHILS NFR BLD AUTO: 82.5 %
PLATELET # BLD AUTO: 183 X10*3/UL (ref 150–450)
POTASSIUM SERPL-SCNC: 4 MMOL/L (ref 3.5–5.3)
PROT SERPL-MCNC: 6.6 G/DL (ref 6.4–8.2)
RBC # BLD AUTO: 3.73 X10*6/UL (ref 4.5–5.9)
SODIUM SERPL-SCNC: 139 MMOL/L (ref 136–145)
WBC # BLD AUTO: 9.7 X10*3/UL (ref 4.4–11.3)

## 2024-08-05 PROCEDURE — 85025 COMPLETE CBC W/AUTO DIFF WBC: CPT | Performed by: INTERNAL MEDICINE

## 2024-08-05 PROCEDURE — 2500000004 HC RX 250 GENERAL PHARMACY W/ HCPCS (ALT 636 FOR OP/ED): Mod: JZ,JG | Performed by: INTERNAL MEDICINE

## 2024-08-05 PROCEDURE — 96413 CHEMO IV INFUSION 1 HR: CPT

## 2024-08-05 PROCEDURE — 84075 ASSAY ALKALINE PHOSPHATASE: CPT | Performed by: INTERNAL MEDICINE

## 2024-08-05 PROCEDURE — 2500000001 HC RX 250 WO HCPCS SELF ADMINISTERED DRUGS (ALT 637 FOR MEDICARE OP): Performed by: INTERNAL MEDICINE

## 2024-08-05 PROCEDURE — 96375 TX/PRO/DX INJ NEW DRUG ADDON: CPT | Mod: INF

## 2024-08-05 PROCEDURE — 96415 CHEMO IV INFUSION ADDL HR: CPT

## 2024-08-05 RX ORDER — ALBUTEROL SULFATE 0.83 MG/ML
3 SOLUTION RESPIRATORY (INHALATION) AS NEEDED
Status: DISCONTINUED | OUTPATIENT
Start: 2024-08-05 | End: 2024-08-05 | Stop reason: HOSPADM

## 2024-08-05 RX ORDER — HEPARIN SODIUM,PORCINE/PF 10 UNIT/ML
50 SYRINGE (ML) INTRAVENOUS AS NEEDED
OUTPATIENT
Start: 2024-08-05

## 2024-08-05 RX ORDER — ACETAMINOPHEN 325 MG/1
650 TABLET ORAL ONCE
Status: COMPLETED | OUTPATIENT
Start: 2024-08-05 | End: 2024-08-05

## 2024-08-05 RX ORDER — DEXAMETHASONE IN 0.9 % SOD CHL 20 MG/50ML
20 INTRAVENOUS SOLUTION, PIGGYBACK (ML) INTRAVENOUS ONCE
Status: COMPLETED | OUTPATIENT
Start: 2024-08-05 | End: 2024-08-05

## 2024-08-05 RX ORDER — EPINEPHRINE 0.3 MG/.3ML
0.3 INJECTION SUBCUTANEOUS EVERY 5 MIN PRN
Status: DISCONTINUED | OUTPATIENT
Start: 2024-08-05 | End: 2024-08-05 | Stop reason: HOSPADM

## 2024-08-05 RX ORDER — DIPHENHYDRAMINE HCL 25 MG
50 CAPSULE ORAL ONCE
Status: COMPLETED | OUTPATIENT
Start: 2024-08-05 | End: 2024-08-05

## 2024-08-05 RX ORDER — HEPARIN 100 UNIT/ML
500 SYRINGE INTRAVENOUS AS NEEDED
OUTPATIENT
Start: 2024-08-05

## 2024-08-05 RX ORDER — DIPHENHYDRAMINE HYDROCHLORIDE 50 MG/ML
50 INJECTION INTRAMUSCULAR; INTRAVENOUS AS NEEDED
Status: DISCONTINUED | OUTPATIENT
Start: 2024-08-05 | End: 2024-08-05 | Stop reason: HOSPADM

## 2024-08-05 RX ORDER — FAMOTIDINE 10 MG/ML
20 INJECTION INTRAVENOUS ONCE AS NEEDED
Status: DISCONTINUED | OUTPATIENT
Start: 2024-08-05 | End: 2024-08-05 | Stop reason: HOSPADM

## 2024-08-05 ASSESSMENT — PAIN SCALES - GENERAL: PAINLEVEL: 0-NO PAIN

## 2024-08-05 NOTE — SIGNIFICANT EVENT

## 2024-08-05 NOTE — PROGRESS NOTES
Obinutuzumab infusion tolerated without incident. VS monitored. Schedule reviewed then Dc'd via independent / ambulatory

## 2024-08-05 NOTE — PROGRESS NOTES
Obinutuzumab Rates /Volumes    (100 mg) 30.7 / 15.3  (200 mg) 61.4 / 30.7  (300mg) 92.1 / 46  (400mg) 123 / 215

## 2024-08-06 ENCOUNTER — APPOINTMENT (OUTPATIENT)
Dept: PHYSICAL MEDICINE AND REHAB | Facility: CLINIC | Age: 84
End: 2024-08-06
Payer: MEDICARE

## 2024-08-06 ENCOUNTER — APPOINTMENT (OUTPATIENT)
Dept: CARDIOLOGY | Facility: HOSPITAL | Age: 84
End: 2024-08-06
Payer: MEDICARE

## 2024-08-15 ENCOUNTER — APPOINTMENT (OUTPATIENT)
Dept: PRIMARY CARE | Facility: CLINIC | Age: 84
End: 2024-08-15
Payer: MEDICARE

## 2024-08-19 ENCOUNTER — OFFICE VISIT (OUTPATIENT)
Dept: HEMATOLOGY/ONCOLOGY | Facility: CLINIC | Age: 84
End: 2024-08-19
Payer: MEDICARE

## 2024-08-19 ENCOUNTER — INFUSION (OUTPATIENT)
Dept: HEMATOLOGY/ONCOLOGY | Facility: CLINIC | Age: 84
End: 2024-08-19
Payer: MEDICARE

## 2024-08-19 VITALS
WEIGHT: 237.1 LBS | OXYGEN SATURATION: 92 % | SYSTOLIC BLOOD PRESSURE: 156 MMHG | TEMPERATURE: 97.2 F | HEART RATE: 101 BPM | BODY MASS INDEX: 34.02 KG/M2 | DIASTOLIC BLOOD PRESSURE: 74 MMHG | RESPIRATION RATE: 16 BRPM

## 2024-08-19 VITALS
HEART RATE: 72 BPM | SYSTOLIC BLOOD PRESSURE: 118 MMHG | OXYGEN SATURATION: 96 % | TEMPERATURE: 97.7 F | DIASTOLIC BLOOD PRESSURE: 73 MMHG | RESPIRATION RATE: 16 BRPM

## 2024-08-19 DIAGNOSIS — C82.18 GRADE 2 FOLLICULAR LYMPHOMA OF LYMPH NODES OF MULTIPLE REGIONS (MULTI): ICD-10-CM

## 2024-08-19 LAB
ALBUMIN SERPL BCP-MCNC: 4 G/DL (ref 3.4–5)
ALP SERPL-CCNC: 138 U/L (ref 33–136)
ALT SERPL W P-5'-P-CCNC: 21 U/L (ref 10–52)
ANION GAP SERPL CALC-SCNC: 11 MMOL/L (ref 10–20)
AST SERPL W P-5'-P-CCNC: 23 U/L (ref 9–39)
BASOPHILS # BLD AUTO: 0.06 X10*3/UL (ref 0–0.1)
BASOPHILS NFR BLD AUTO: 0.9 %
BILIRUB SERPL-MCNC: 0.6 MG/DL (ref 0–1.2)
BUN SERPL-MCNC: 19 MG/DL (ref 6–23)
CALCIUM SERPL-MCNC: 8.5 MG/DL (ref 8.6–10.3)
CHLORIDE SERPL-SCNC: 106 MMOL/L (ref 98–107)
CO2 SERPL-SCNC: 28 MMOL/L (ref 21–32)
CREAT SERPL-MCNC: 0.96 MG/DL (ref 0.5–1.3)
EGFRCR SERPLBLD CKD-EPI 2021: 77 ML/MIN/1.73M*2
EOSINOPHIL # BLD AUTO: 0.37 X10*3/UL (ref 0–0.4)
EOSINOPHIL NFR BLD AUTO: 5.7 %
ERYTHROCYTE [DISTWIDTH] IN BLOOD BY AUTOMATED COUNT: 12.8 % (ref 11.5–14.5)
GLUCOSE SERPL-MCNC: 132 MG/DL (ref 74–99)
HCT VFR BLD AUTO: 37.1 % (ref 41–52)
HGB BLD-MCNC: 12 G/DL (ref 13.5–17.5)
IMM GRANULOCYTES # BLD AUTO: 0.01 X10*3/UL (ref 0–0.5)
IMM GRANULOCYTES NFR BLD AUTO: 0.2 % (ref 0–0.9)
LYMPHOCYTES # BLD AUTO: 0.79 X10*3/UL (ref 0.8–3)
LYMPHOCYTES NFR BLD AUTO: 12.1 %
MCH RBC QN AUTO: 31.5 PG (ref 26–34)
MCHC RBC AUTO-ENTMCNC: 32.3 G/DL (ref 32–36)
MCV RBC AUTO: 97 FL (ref 80–100)
MONOCYTES # BLD AUTO: 0.66 X10*3/UL (ref 0.05–0.8)
MONOCYTES NFR BLD AUTO: 10.1 %
NEUTROPHILS # BLD AUTO: 4.62 X10*3/UL (ref 1.6–5.5)
NEUTROPHILS NFR BLD AUTO: 71 %
PLATELET # BLD AUTO: 175 X10*3/UL (ref 150–450)
POTASSIUM SERPL-SCNC: 3.8 MMOL/L (ref 3.5–5.3)
PROT SERPL-MCNC: 6.8 G/DL (ref 6.4–8.2)
RBC # BLD AUTO: 3.81 X10*6/UL (ref 4.5–5.9)
SODIUM SERPL-SCNC: 141 MMOL/L (ref 136–145)
WBC # BLD AUTO: 6.5 X10*3/UL (ref 4.4–11.3)

## 2024-08-19 PROCEDURE — 99215 OFFICE O/P EST HI 40 MIN: CPT | Performed by: INTERNAL MEDICINE

## 2024-08-19 PROCEDURE — 3078F DIAST BP <80 MM HG: CPT | Performed by: INTERNAL MEDICINE

## 2024-08-19 PROCEDURE — 96413 CHEMO IV INFUSION 1 HR: CPT

## 2024-08-19 PROCEDURE — 2500000004 HC RX 250 GENERAL PHARMACY W/ HCPCS (ALT 636 FOR OP/ED): Performed by: INTERNAL MEDICINE

## 2024-08-19 PROCEDURE — 2500000001 HC RX 250 WO HCPCS SELF ADMINISTERED DRUGS (ALT 637 FOR MEDICARE OP): Performed by: INTERNAL MEDICINE

## 2024-08-19 PROCEDURE — 96415 CHEMO IV INFUSION ADDL HR: CPT

## 2024-08-19 PROCEDURE — 1159F MED LIST DOCD IN RCRD: CPT | Performed by: INTERNAL MEDICINE

## 2024-08-19 PROCEDURE — 1036F TOBACCO NON-USER: CPT | Performed by: INTERNAL MEDICINE

## 2024-08-19 PROCEDURE — 96375 TX/PRO/DX INJ NEW DRUG ADDON: CPT | Mod: INF

## 2024-08-19 PROCEDURE — 80053 COMPREHEN METABOLIC PANEL: CPT | Performed by: INTERNAL MEDICINE

## 2024-08-19 PROCEDURE — 1126F AMNT PAIN NOTED NONE PRSNT: CPT | Performed by: INTERNAL MEDICINE

## 2024-08-19 PROCEDURE — 99215 OFFICE O/P EST HI 40 MIN: CPT | Mod: 25 | Performed by: INTERNAL MEDICINE

## 2024-08-19 PROCEDURE — 96411 CHEMO IV PUSH ADDL DRUG: CPT

## 2024-08-19 PROCEDURE — 3077F SYST BP >= 140 MM HG: CPT | Performed by: INTERNAL MEDICINE

## 2024-08-19 PROCEDURE — 85025 COMPLETE CBC W/AUTO DIFF WBC: CPT | Performed by: INTERNAL MEDICINE

## 2024-08-19 RX ORDER — DEXAMETHASONE IN 0.9 % SOD CHL 20 MG/50ML
20 INTRAVENOUS SOLUTION, PIGGYBACK (ML) INTRAVENOUS ONCE
Status: COMPLETED | OUTPATIENT
Start: 2024-08-19 | End: 2024-08-19

## 2024-08-19 RX ORDER — FAMOTIDINE 10 MG/ML
20 INJECTION INTRAVENOUS ONCE AS NEEDED
Status: DISCONTINUED | OUTPATIENT
Start: 2024-08-19 | End: 2024-08-19 | Stop reason: HOSPADM

## 2024-08-19 RX ORDER — ALBUTEROL SULFATE 0.83 MG/ML
3 SOLUTION RESPIRATORY (INHALATION) AS NEEDED
Status: DISCONTINUED | OUTPATIENT
Start: 2024-08-19 | End: 2024-08-19 | Stop reason: HOSPADM

## 2024-08-19 RX ORDER — PROCHLORPERAZINE EDISYLATE 5 MG/ML
10 INJECTION INTRAMUSCULAR; INTRAVENOUS EVERY 6 HOURS PRN
Status: DISCONTINUED | OUTPATIENT
Start: 2024-08-19 | End: 2024-08-19 | Stop reason: HOSPADM

## 2024-08-19 RX ORDER — HEPARIN 100 UNIT/ML
500 SYRINGE INTRAVENOUS AS NEEDED
Status: CANCELLED | OUTPATIENT
Start: 2024-08-19

## 2024-08-19 RX ORDER — HEPARIN SODIUM,PORCINE/PF 10 UNIT/ML
50 SYRINGE (ML) INTRAVENOUS AS NEEDED
Status: CANCELLED | OUTPATIENT
Start: 2024-08-19

## 2024-08-19 RX ORDER — DIPHENHYDRAMINE HYDROCHLORIDE 50 MG/ML
50 INJECTION INTRAMUSCULAR; INTRAVENOUS AS NEEDED
Status: DISCONTINUED | OUTPATIENT
Start: 2024-08-19 | End: 2024-08-19 | Stop reason: HOSPADM

## 2024-08-19 RX ORDER — PALONOSETRON 0.05 MG/ML
0.25 INJECTION, SOLUTION INTRAVENOUS ONCE
Status: COMPLETED | OUTPATIENT
Start: 2024-08-19 | End: 2024-08-19

## 2024-08-19 RX ORDER — EPINEPHRINE 0.3 MG/.3ML
0.3 INJECTION SUBCUTANEOUS EVERY 5 MIN PRN
Status: DISCONTINUED | OUTPATIENT
Start: 2024-08-19 | End: 2024-08-19 | Stop reason: HOSPADM

## 2024-08-19 RX ORDER — PROCHLORPERAZINE MALEATE 10 MG
10 TABLET ORAL EVERY 6 HOURS PRN
Status: DISCONTINUED | OUTPATIENT
Start: 2024-08-19 | End: 2024-08-19 | Stop reason: HOSPADM

## 2024-08-19 RX ORDER — DIPHENHYDRAMINE HCL 25 MG
50 CAPSULE ORAL ONCE
Status: COMPLETED | OUTPATIENT
Start: 2024-08-19 | End: 2024-08-19

## 2024-08-19 RX ORDER — ACETAMINOPHEN 325 MG/1
650 TABLET ORAL ONCE
Status: COMPLETED | OUTPATIENT
Start: 2024-08-19 | End: 2024-08-19

## 2024-08-19 SDOH — ECONOMIC STABILITY: FOOD INSECURITY: WITHIN THE PAST 12 MONTHS, YOU WORRIED THAT YOUR FOOD WOULD RUN OUT BEFORE YOU GOT MONEY TO BUY MORE.: NEVER TRUE

## 2024-08-19 SDOH — ECONOMIC STABILITY: FOOD INSECURITY: WITHIN THE PAST 12 MONTHS, THE FOOD YOU BOUGHT JUST DIDN'T LAST AND YOU DIDN'T HAVE MONEY TO GET MORE.: NEVER TRUE

## 2024-08-19 ASSESSMENT — ENCOUNTER SYMPTOMS
EYES NEGATIVE: 1
GASTROINTESTINAL NEGATIVE: 1
RESPIRATORY NEGATIVE: 1
CONSTITUTIONAL NEGATIVE: 1
CARDIOVASCULAR NEGATIVE: 1

## 2024-08-19 ASSESSMENT — PAIN SCALES - GENERAL: PAINLEVEL: 0-NO PAIN

## 2024-08-19 NOTE — SIGNIFICANT EVENT

## 2024-08-19 NOTE — PROGRESS NOTES
Patient ID: Angel Garibay is a 84 y.o. male.  Referring Physician: Akila Gleason MD  [id number]  [place] [id number]  Primary Care Provider: Wilbur Jurado DO  Visit Type:  Follow Up     Subjective    HPI  83 year old gentleman with past medical history of HL, HTN, CAD, SVT, BPH, vit D deficiency, anxiety who is following for hx of low grade follicular lymphoma      Presented to evaluation in Nov/Dec 2018 with Dr. Nils Alejandre   noted small nodule in left neck; 2 lymph nodes were seen on ultrasound, one of which was excised showing and Low grade follicular non-hodgkin lymphoma.   11/13/2018 US neck  left submandibular space there is an enlarged hypoechoic lymph node with increased vascular flow and thickened cortex measuring approximately 2.3 x 1.7 x 2.4 cm in size. The cortex of the lymph node measures approximately 1.7 in thickness. Smaller lymph node more laterally measures approximately 1.6 x 1.2 x 1.3 cm in size  Left neck node excisional bx 11/27/18: low grade FL   12/24/18: CT neck: The left neck has multiple enlarged lymph nodes extending from level 1 B inferiorly to level 4.  12/24/18 CT C/A/P: Haziness of the mesenteric fat along the distribution of the SMA, with borderline lymph nodes, the appearance similar to slightly more apparent when compared to the prior examination of 08/14/2015.     Received weekly Rituximab x 4 from 2/7/19 to 2/28/19 8/5/19 CT C/A/P: no evidence of disease, no repeat CT neck but clinical response reported in notes      Followed with surveillance      11/20/23: CT neck: the parotid gland bilaterally measuring 2.2 x 2.0 cm on the right and 2.6 x 2.0 cm on the left. There are 2 nodules within the superficial lobe of the right parotid gland measuring 1.1 x 0.9 and 1.3 x 1.2 cm.  12/12/23: PET scan: 1. Right parotid gland with two superficial hypermetabolic soft tissue nodules and additional large hypermetabolic deep right parotid medial soft  tissue lesion. Hypermetabolic soft tissue density located  medial to the left parotid gland. Additional multiple bilateral cervical hypermetabolic lymphadenopathy. Findings are compatible with relapsed lymphoma.  2. Multiple subdiaphragmatic abdominal and pelvic hyper metabolic lymphadenopathy and multiple hypermetabolic abdominal and pelvic peritoneal implants, consistent with blessing and extranodal disease involvement.  3. Left kidney with irregular contour, consider further correlation with renal ultrasound.     12/14/23: LYMPH NODE, PAROTID, CORE BIOPSY:  -- CLASSIC FOLLICULAR LYMPHOMA (WHO 2022).  SEE NOTE.   -- FOLLICULAR LYMPHOMA, GRADE 1-2 (ICC 2022).  Review of Systems   Constitutional: Negative.    HENT:  Negative.     Eyes: Negative.    Respiratory: Negative.     Cardiovascular: Negative.    Gastrointestinal: Negative.         Objective   BSA: There is no height or weight on file to calculate BSA.  There were no vitals taken for this visit.     has a past medical history of Anxiety, BPH (benign prostatic hyperplasia), CAD (coronary artery disease), HLD (hyperlipidemia), Lymphedema, Paroxysmal SVT (supraventricular tachycardia) (CMS-HCC), Personal history of non-Hodgkin lymphomas (10/07/2021), and S/P drug eluting coronary stent placement.   has a past surgical history that includes US guided biopsy lymph node superficial (12/14/2023); Coronary stent placement; Cardiac catheterization; and Cholecystectomy.  No family history on file.  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 He has never used smokeless tobacco.  He  reports no history of alcohol use.  He  reports no history of drug use.    Physical Exam  Constitutional:       Appearance: Normal appearance.   HENT:      Head: Normocephalic and atraumatic.   Eyes:      Extraocular Movements: Extraocular movements intact.      Pupils: Pupils are equal, round, and reactive to light.   Neurological:      Mental Status: He is alert.         WBC   Date/Time Value Ref Range Status   08/05/2024 10:24 AM 9.7 4.4 - 11.3 x10*3/uL Final   07/29/2024 09:29 AM 20.5 (H) 4.4 - 11.3 x10*3/uL Final   07/22/2024 08:31 AM 6.0 4.4 - 11.3 x10*3/uL Final     nRBC   Date Value Ref Range Status   07/29/2024   Final     Comment:     Not Measured     RBC   Date Value Ref Range Status   08/05/2024 3.73 (L) 4.50 - 5.90 x10*6/uL Final   07/29/2024 3.96 (L) 4.50 - 5.90 x10*6/uL Final   07/22/2024 4.05 (L) 4.50 - 5.90 x10*6/uL Final     Hemoglobin   Date Value Ref Range Status   08/05/2024 11.6 (L) 13.5 - 17.5 g/dL Final   07/29/2024 12.1 (L) 13.5 - 17.5 g/dL Final   07/22/2024 12.6 (L) 13.5 - 17.5 g/dL Final     Hematocrit   Date Value Ref Range Status   08/05/2024 36.7 (L) 41.0 - 52.0 % Final   07/29/2024 38.9 (L) 41.0 - 52.0 % Final   07/22/2024 39.2 (L) 41.0 - 52.0 % Final     MCV   Date/Time Value Ref Range Status   08/05/2024 10:24 AM 98 80 - 100 fL Final   07/29/2024 09:29 AM 98 80 - 100 fL Final   07/22/2024 08:31 AM 97 80 - 100 fL Final     MCH   Date/Time Value Ref Range Status   08/05/2024 10:24 AM 31.1 26.0 - 34.0 pg Final   07/29/2024 09:29 AM 30.6 26.0 - 34.0 pg Final   07/22/2024 08:31 AM 31.1 26.0 - 34.0 pg Final     MCHC   Date/Time Value Ref Range Status   08/05/2024 10:24 AM 31.6 (L) 32.0 - 36.0 g/dL Final   07/29/2024 09:29 AM 31.1 (L) 32.0 - 36.0 g/dL Final  "  07/22/2024 08:31 AM 32.1 32.0 - 36.0 g/dL Final     RDW   Date/Time Value Ref Range Status   08/05/2024 10:24 AM 12.9 11.5 - 14.5 % Final   07/29/2024 09:29 AM 13.0 11.5 - 14.5 % Final   07/22/2024 08:31 AM 12.2 11.5 - 14.5 % Final     Platelets   Date/Time Value Ref Range Status   08/05/2024 10:24  150 - 450 x10*3/uL Final   07/29/2024 09:29  150 - 450 x10*3/uL Final   07/22/2024 08:31  150 - 450 x10*3/uL Final     No results found for: \"MPV\"  Neutrophils %   Date/Time Value Ref Range Status   08/05/2024 10:24 AM 82.5 40.0 - 80.0 % Final   07/29/2024 09:29 AM 82.8 40.0 - 80.0 % Final   07/22/2024 08:31 AM 66.5 40.0 - 80.0 % Final     Immature Granulocytes %, Automated   Date/Time Value Ref Range Status   08/05/2024 10:24 AM 0.4 0.0 - 0.9 % Final     Comment:     Immature Granulocyte Count (IG) includes promyelocytes, myelocytes and metamyelocytes but does not include bands. Percent differential counts (%) should be interpreted in the context of the absolute cell counts (cells/UL).   07/29/2024 09:29 AM 2.7 (H) 0.0 - 0.9 % Final     Comment:     Immature Granulocyte Count (IG) includes promyelocytes, myelocytes and metamyelocytes but does not include bands. Percent differential counts (%) should be interpreted in the context of the absolute cell counts (cells/UL).   07/22/2024 08:31 AM 0.2 0.0 - 0.9 % Final     Comment:     Immature Granulocyte Count (IG) includes promyelocytes, myelocytes and metamyelocytes but does not include bands. Percent differential counts (%) should be interpreted in the context of the absolute cell counts (cells/UL).     Lymphocytes %   Date/Time Value Ref Range Status   08/05/2024 10:24 AM 4.3 13.0 - 44.0 % Final   07/29/2024 09:29 AM 1.0 13.0 - 44.0 % Final   07/22/2024 08:31 AM 19.6 13.0 - 44.0 % Final     Monocytes %   Date/Time Value Ref Range Status   08/05/2024 10:24 AM 8.7 2.0 - 10.0 % Final   07/29/2024 09:29 AM 10.7 2.0 - 10.0 % Final   07/22/2024 08:31 AM 8.2 " 2.0 - 10.0 % Final     Eosinophils %   Date/Time Value Ref Range Status   08/05/2024 10:24 AM 3.6 0.0 - 6.0 % Final   07/29/2024 09:29 AM 2.5 0.0 - 6.0 % Final   07/22/2024 08:31 AM 4.8 0.0 - 6.0 % Final     Basophils %   Date/Time Value Ref Range Status   08/05/2024 10:24 AM 0.5 0.0 - 2.0 % Final   07/29/2024 09:29 AM 0.3 0.0 - 2.0 % Final   07/22/2024 08:31 AM 0.7 0.0 - 2.0 % Final     Neutrophils Absolute   Date/Time Value Ref Range Status   08/05/2024 10:24 AM 7.96 (H) 1.60 - 5.50 x10*3/uL Final     Comment:     Percent differential counts (%) should be interpreted in the context of the absolute cell counts (cells/uL).   07/29/2024 09:29 AM 16.95 (H) 1.60 - 5.50 x10*3/uL Final     Comment:     Percent differential counts (%) should be interpreted in the context of the absolute cell counts (cells/uL).   07/22/2024 08:31 AM 4.00 1.60 - 5.50 x10*3/uL Final     Comment:     Percent differential counts (%) should be interpreted in the context of the absolute cell counts (cells/uL).     Immature Granulocytes Absolute, Automated   Date/Time Value Ref Range Status   08/05/2024 10:24 AM 0.04 0.00 - 0.50 x10*3/uL Final   07/29/2024 09:29 AM 0.55 (H) 0.00 - 0.50 x10*3/uL Final   07/22/2024 08:31 AM 0.01 0.00 - 0.50 x10*3/uL Final     Lymphocytes Absolute   Date/Time Value Ref Range Status   08/05/2024 10:24 AM 0.42 (L) 0.80 - 3.00 x10*3/uL Final   07/29/2024 09:29 AM 0.21 (L) 0.80 - 3.00 x10*3/uL Final   07/22/2024 08:31 AM 1.18 0.80 - 3.00 x10*3/uL Final     Monocytes Absolute   Date/Time Value Ref Range Status   08/05/2024 10:24 AM 0.84 (H) 0.05 - 0.80 x10*3/uL Final   07/29/2024 09:29 AM 2.20 (H) 0.05 - 0.80 x10*3/uL Final   07/22/2024 08:31 AM 0.49 0.05 - 0.80 x10*3/uL Final     Eosinophils Absolute   Date/Time Value Ref Range Status   08/05/2024 10:24 AM 0.35 0.00 - 0.40 x10*3/uL Final   07/29/2024 09:29 AM 0.51 (H) 0.00 - 0.40 x10*3/uL Final   07/22/2024 08:31 AM 0.29 0.00 - 0.40 x10*3/uL Final     Basophils Absolute    Date/Time Value Ref Range Status   08/05/2024 10:24 AM 0.05 0.00 - 0.10 x10*3/uL Final   07/29/2024 09:29 AM 0.06 0.00 - 0.10 x10*3/uL Final     Comment:     Automated WBC differential has been confirmed by manual smear.   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 s/p Rituxan x 4 weekly doses in 2018   Now with evidence of widespread recurrence although not much symptomatic   Due to multiple areas of disease particularly in the abdomen/peritoneum at risk of complications would recommend treatment rather than surveillance at this time      We discussed re-treatment with Rituxan or Obinutuzumab vs anti-CD20 + Revlimid or Bendamustine, we discussed the pros and cons of each, he leans towards doing less and even considering not doing treatment      Recommended in this case that we do at least a repeat Rituximab course, he will think about it and let us know      1/24/2024-we discussed his options again and he expressed desire to proceed with treatment, we discussed his therapy options again including rituximab plus or minus additional agents  After our discussion the plan was to proceed with rituximab and reserve additional therapy in case of no response  Will plan for 4 weekly doses of Rituxan followed by restaging PET scan 1 month after completion at which point we will also discuss maintenance treatment     2/21/2024-status post 4 doses of weekly rituximab with good tolerance  We will follow-up restaging PET scan in around a month  If there is any concern we will consider biopsying the parotid gland as the original SUV was high to rule out any transformation  If there is response then we will discuss maintenance rituximab     3/27- PET scan after rituxan showed progressive disease and increased avidity   We will send to ENT for excisional LN biopsy r/o large cell transformation   Depending on  biopsy we can determine treatment options for high vs low grade lymphoma regimen, he is also considering no treatment         4/18- s/p biopsy, the result is pending, will ask for RTC once the result is available, meantime ask for post op ENT check up     6/13-last appointment patient had deferred starting any treatment for his follicular lymphoma, on discussion today he again expressed that he does not think chemotherapy may be worth it but if it will provide significant survival benefit he will consider, I provided him again today with information on Bendamustine which he is now considering.     7/22/2024: Patient presenting today to start treatment with Bendamustine plus Obinutuzumab for recurrent follicular cell lymphoma.  All questions asked and answered.     Assessment/Plan      8/19/2024: No complaints Proceed with C2.  PET scan ordered to response to therapy.  Proceed with Mediport Placement.     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

## 2024-08-19 NOTE — PROGRESS NOTES
Bendamustine and Obinutuzumab infusions tolerated without incident. Schedule reviewed then pt Dc'd via independent / ambulatory

## 2024-08-19 NOTE — PATIENT INSTRUCTIONS
Today you visited with your Oncologist.  Your recent labs were discussed and questions were answered.  Your current treatment regimen was discussed and the plan going forward was also discussed.  Scheduling orders were placed to reflect this conversation.  Please call our office for any questions that may arise after leaving today.   [phone]    Review your schedule upon leaving every infusion visit.  Remember you will no longer see lab visits noted separately on your schedule.    Dr Gleason will see you at the start of your next cycle to review your PET scan

## 2024-08-19 NOTE — PROGRESS NOTES
Obinutuzumab Rates 100mg / hour  Rate / Volume      (100) 30.7 / 15.4    (200) 61.4 / 30.7    (300) 92.1 / 46    (400) 123 / 215

## 2024-08-20 ENCOUNTER — INFUSION (OUTPATIENT)
Dept: HEMATOLOGY/ONCOLOGY | Facility: CLINIC | Age: 84
End: 2024-08-20
Payer: MEDICARE

## 2024-08-20 VITALS
TEMPERATURE: 97.5 F | BODY MASS INDEX: 34.48 KG/M2 | DIASTOLIC BLOOD PRESSURE: 64 MMHG | RESPIRATION RATE: 16 BRPM | WEIGHT: 240.3 LBS | HEART RATE: 85 BPM | SYSTOLIC BLOOD PRESSURE: 116 MMHG | OXYGEN SATURATION: 94 %

## 2024-08-20 DIAGNOSIS — C82.18 GRADE 2 FOLLICULAR LYMPHOMA OF LYMPH NODES OF MULTIPLE REGIONS (MULTI): ICD-10-CM

## 2024-08-20 PROCEDURE — 96409 CHEMO IV PUSH SNGL DRUG: CPT

## 2024-08-20 PROCEDURE — 2500000004 HC RX 250 GENERAL PHARMACY W/ HCPCS (ALT 636 FOR OP/ED): Performed by: INTERNAL MEDICINE

## 2024-08-20 PROCEDURE — 96375 TX/PRO/DX INJ NEW DRUG ADDON: CPT | Mod: INF

## 2024-08-20 RX ORDER — FAMOTIDINE 10 MG/ML
20 INJECTION INTRAVENOUS ONCE AS NEEDED
Status: DISCONTINUED | OUTPATIENT
Start: 2024-08-20 | End: 2024-08-20 | Stop reason: HOSPADM

## 2024-08-20 RX ORDER — DIPHENHYDRAMINE HYDROCHLORIDE 50 MG/ML
50 INJECTION INTRAMUSCULAR; INTRAVENOUS AS NEEDED
Status: DISCONTINUED | OUTPATIENT
Start: 2024-08-20 | End: 2024-08-20 | Stop reason: HOSPADM

## 2024-08-20 RX ORDER — ALBUTEROL SULFATE 0.83 MG/ML
3 SOLUTION RESPIRATORY (INHALATION) AS NEEDED
Status: DISCONTINUED | OUTPATIENT
Start: 2024-08-20 | End: 2024-08-20 | Stop reason: HOSPADM

## 2024-08-20 RX ORDER — HEPARIN 100 UNIT/ML
500 SYRINGE INTRAVENOUS AS NEEDED
OUTPATIENT
Start: 2024-08-20

## 2024-08-20 RX ORDER — HEPARIN SODIUM,PORCINE/PF 10 UNIT/ML
50 SYRINGE (ML) INTRAVENOUS AS NEEDED
OUTPATIENT
Start: 2024-08-20

## 2024-08-20 RX ORDER — PROCHLORPERAZINE MALEATE 10 MG
10 TABLET ORAL EVERY 6 HOURS PRN
Status: DISCONTINUED | OUTPATIENT
Start: 2024-08-20 | End: 2024-08-20 | Stop reason: HOSPADM

## 2024-08-20 RX ORDER — EPINEPHRINE 0.3 MG/.3ML
0.3 INJECTION SUBCUTANEOUS EVERY 5 MIN PRN
Status: DISCONTINUED | OUTPATIENT
Start: 2024-08-20 | End: 2024-08-20 | Stop reason: HOSPADM

## 2024-08-20 RX ORDER — PROCHLORPERAZINE EDISYLATE 5 MG/ML
10 INJECTION INTRAMUSCULAR; INTRAVENOUS EVERY 6 HOURS PRN
Status: DISCONTINUED | OUTPATIENT
Start: 2024-08-20 | End: 2024-08-20 | Stop reason: HOSPADM

## 2024-08-20 ASSESSMENT — PAIN SCALES - GENERAL: PAINLEVEL: 0-NO PAIN

## 2024-08-20 NOTE — SIGNIFICANT EVENT

## 2024-08-20 NOTE — PROGRESS NOTES
1340. Infusions tolerated without incident. Schedule reviewed then Dc'd via independent /ambulatory

## 2024-08-21 ENCOUNTER — INFUSION (OUTPATIENT)
Dept: HEMATOLOGY/ONCOLOGY | Facility: CLINIC | Age: 84
End: 2024-08-21
Payer: MEDICARE

## 2024-08-21 VITALS
HEART RATE: 79 BPM | DIASTOLIC BLOOD PRESSURE: 71 MMHG | TEMPERATURE: 97 F | BODY MASS INDEX: 34.69 KG/M2 | SYSTOLIC BLOOD PRESSURE: 153 MMHG | WEIGHT: 241.73 LBS | RESPIRATION RATE: 17 BRPM | OXYGEN SATURATION: 94 %

## 2024-08-21 DIAGNOSIS — C82.18 GRADE 2 FOLLICULAR LYMPHOMA OF LYMPH NODES OF MULTIPLE REGIONS (MULTI): ICD-10-CM

## 2024-08-21 PROCEDURE — 96372 THER/PROPH/DIAG INJ SC/IM: CPT

## 2024-08-21 PROCEDURE — 2500000004 HC RX 250 GENERAL PHARMACY W/ HCPCS (ALT 636 FOR OP/ED): Mod: JZ,JG | Performed by: INTERNAL MEDICINE

## 2024-08-21 RX ORDER — ALBUTEROL SULFATE 0.83 MG/ML
3 SOLUTION RESPIRATORY (INHALATION) AS NEEDED
Status: DISCONTINUED | OUTPATIENT
Start: 2024-08-21 | End: 2024-08-21 | Stop reason: HOSPADM

## 2024-08-21 RX ORDER — FAMOTIDINE 10 MG/ML
20 INJECTION INTRAVENOUS ONCE AS NEEDED
Status: DISCONTINUED | OUTPATIENT
Start: 2024-08-21 | End: 2024-08-21 | Stop reason: HOSPADM

## 2024-08-21 RX ORDER — EPINEPHRINE 0.3 MG/.3ML
0.3 INJECTION SUBCUTANEOUS EVERY 5 MIN PRN
Status: DISCONTINUED | OUTPATIENT
Start: 2024-08-21 | End: 2024-08-21 | Stop reason: HOSPADM

## 2024-08-21 RX ORDER — DIPHENHYDRAMINE HYDROCHLORIDE 50 MG/ML
50 INJECTION INTRAMUSCULAR; INTRAVENOUS AS NEEDED
Status: DISCONTINUED | OUTPATIENT
Start: 2024-08-21 | End: 2024-08-21 | Stop reason: HOSPADM

## 2024-08-21 ASSESSMENT — PAIN SCALES - GENERAL: PAINLEVEL: 0-NO PAIN

## 2024-09-09 ENCOUNTER — TELEPHONE (OUTPATIENT)
Dept: HEMATOLOGY/ONCOLOGY | Facility: CLINIC | Age: 84
End: 2024-09-09
Payer: MEDICARE

## 2024-09-09 NOTE — TELEPHONE ENCOUNTER
Pt called to cancel all appts, does not want to continue treatment. Dr Gleason aware. Attempted to call to verify with pt, no answer and unable to leave VM. Will attempt to call again to verify wishes prior to cancelling all appts.

## 2024-09-10 ENCOUNTER — APPOINTMENT (OUTPATIENT)
Dept: RADIOLOGY | Facility: HOSPITAL | Age: 84
End: 2024-09-10
Payer: MEDICARE

## 2024-09-16 ENCOUNTER — APPOINTMENT (OUTPATIENT)
Dept: HEMATOLOGY/ONCOLOGY | Facility: CLINIC | Age: 84
End: 2024-09-16
Payer: MEDICARE

## 2024-09-17 ENCOUNTER — APPOINTMENT (OUTPATIENT)
Dept: HEMATOLOGY/ONCOLOGY | Facility: CLINIC | Age: 84
End: 2024-09-17
Payer: MEDICARE

## 2024-09-18 ENCOUNTER — APPOINTMENT (OUTPATIENT)
Dept: HEMATOLOGY/ONCOLOGY | Facility: CLINIC | Age: 84
End: 2024-09-18
Payer: MEDICARE

## 2024-09-19 ENCOUNTER — APPOINTMENT (OUTPATIENT)
Dept: HEMATOLOGY/ONCOLOGY | Facility: CLINIC | Age: 84
End: 2024-09-19
Payer: MEDICARE

## 2024-09-19 ENCOUNTER — APPOINTMENT (OUTPATIENT)
Dept: CARDIOLOGY | Facility: HOSPITAL | Age: 84
End: 2024-09-19
Payer: MEDICARE

## 2024-09-19 ENCOUNTER — HOSPITAL ENCOUNTER (OUTPATIENT)
Facility: HOSPITAL | Age: 84
Setting detail: OBSERVATION
Discharge: HOME | End: 2024-09-20
Attending: EMERGENCY MEDICINE | Admitting: INTERNAL MEDICINE
Payer: MEDICARE

## 2024-09-19 ENCOUNTER — APPOINTMENT (OUTPATIENT)
Dept: RADIOLOGY | Facility: HOSPITAL | Age: 84
End: 2024-09-19
Payer: MEDICARE

## 2024-09-19 DIAGNOSIS — J18.9 PNEUMONIA OF BOTH LUNGS DUE TO INFECTIOUS ORGANISM, UNSPECIFIED PART OF LUNG: ICD-10-CM

## 2024-09-19 DIAGNOSIS — K21.9 GASTROESOPHAGEAL REFLUX DISEASE, UNSPECIFIED WHETHER ESOPHAGITIS PRESENT: ICD-10-CM

## 2024-09-19 DIAGNOSIS — R53.1 GENERALIZED WEAKNESS: Primary | ICD-10-CM

## 2024-09-19 PROBLEM — J12.9 VIRAL PNEUMONIA: Status: ACTIVE | Noted: 2024-09-19

## 2024-09-19 LAB
ALBUMIN SERPL BCP-MCNC: 3.5 G/DL (ref 3.4–5)
ALP SERPL-CCNC: 110 U/L (ref 33–136)
ALT SERPL W P-5'-P-CCNC: 22 U/L (ref 10–52)
ANION GAP SERPL CALC-SCNC: 13 MMOL/L (ref 10–20)
APPEARANCE UR: CLEAR
AST SERPL W P-5'-P-CCNC: 28 U/L (ref 9–39)
BASOPHILS # BLD AUTO: 0.09 X10*3/UL (ref 0–0.1)
BASOPHILS NFR BLD AUTO: 1 %
BILIRUB SERPL-MCNC: 0.7 MG/DL (ref 0–1.2)
BILIRUB UR STRIP.AUTO-MCNC: NEGATIVE MG/DL
BNP SERPL-MCNC: 85 PG/ML (ref 0–99)
BUN SERPL-MCNC: 12 MG/DL (ref 6–23)
CALCIUM SERPL-MCNC: 9.1 MG/DL (ref 8.6–10.3)
CARDIAC TROPONIN I PNL SERPL HS: 7 NG/L (ref 0–20)
CARDIAC TROPONIN I PNL SERPL HS: 8 NG/L (ref 0–20)
CHLORIDE SERPL-SCNC: 100 MMOL/L (ref 98–107)
CO2 SERPL-SCNC: 25 MMOL/L (ref 21–32)
COLOR UR: NORMAL
CREAT SERPL-MCNC: 1 MG/DL (ref 0.5–1.3)
EGFRCR SERPLBLD CKD-EPI 2021: 74 ML/MIN/1.73M*2
EOSINOPHIL # BLD AUTO: 0.66 X10*3/UL (ref 0–0.4)
EOSINOPHIL NFR BLD AUTO: 7 %
ERYTHROCYTE [DISTWIDTH] IN BLOOD BY AUTOMATED COUNT: 13 % (ref 11.5–14.5)
FLUAV RNA RESP QL NAA+PROBE: NOT DETECTED
FLUBV RNA RESP QL NAA+PROBE: NOT DETECTED
GLUCOSE SERPL-MCNC: 113 MG/DL (ref 74–99)
GLUCOSE UR STRIP.AUTO-MCNC: NORMAL MG/DL
HCT VFR BLD AUTO: 35.5 % (ref 41–52)
HGB BLD-MCNC: 11.7 G/DL (ref 13.5–17.5)
IMM GRANULOCYTES # BLD AUTO: 0.06 X10*3/UL (ref 0–0.5)
IMM GRANULOCYTES NFR BLD AUTO: 0.6 % (ref 0–0.9)
KETONES UR STRIP.AUTO-MCNC: NEGATIVE MG/DL
LACTATE SERPL-SCNC: 1.8 MMOL/L (ref 0.4–2)
LEUKOCYTE ESTERASE UR QL STRIP.AUTO: NEGATIVE
LIPASE SERPL-CCNC: 18 U/L (ref 9–82)
LYMPHOCYTES # BLD AUTO: 0.79 X10*3/UL (ref 0.8–3)
LYMPHOCYTES NFR BLD AUTO: 8.4 %
MAGNESIUM SERPL-MCNC: 1.88 MG/DL (ref 1.6–2.4)
MCH RBC QN AUTO: 31.2 PG (ref 26–34)
MCHC RBC AUTO-ENTMCNC: 33 G/DL (ref 32–36)
MCV RBC AUTO: 95 FL (ref 80–100)
MONOCYTES # BLD AUTO: 1.85 X10*3/UL (ref 0.05–0.8)
MONOCYTES NFR BLD AUTO: 19.7 %
NEUTROPHILS # BLD AUTO: 5.93 X10*3/UL (ref 1.6–5.5)
NEUTROPHILS NFR BLD AUTO: 63.3 %
NITRITE UR QL STRIP.AUTO: NEGATIVE
NRBC BLD-RTO: 0 /100 WBCS (ref 0–0)
P OFFSET: 178 MS
P ONSET: 140 MS
PH UR STRIP.AUTO: 6.5 [PH]
PLATELET # BLD AUTO: 329 X10*3/UL (ref 150–450)
POTASSIUM SERPL-SCNC: 3.7 MMOL/L (ref 3.5–5.3)
PROT SERPL-MCNC: 6.6 G/DL (ref 6.4–8.2)
PROT UR STRIP.AUTO-MCNC: NEGATIVE MG/DL
Q ONSET: 225 MS
QRS COUNT: 16 BEATS
QRS DURATION: 84 MS
QT INTERVAL: 308 MS
QTC CALCULATION(BAZETT): 393 MS
QTC FREDERICIA: 362 MS
R AXIS: 15 DEGREES
RBC # BLD AUTO: 3.75 X10*6/UL (ref 4.5–5.9)
RBC # UR STRIP.AUTO: NEGATIVE /UL
SARS-COV-2 RNA RESP QL NAA+PROBE: NOT DETECTED
SODIUM SERPL-SCNC: 134 MMOL/L (ref 136–145)
SP GR UR STRIP.AUTO: 1.03
T AXIS: 8 DEGREES
T OFFSET: 379 MS
UROBILINOGEN UR STRIP.AUTO-MCNC: NORMAL MG/DL
VENTRICULAR RATE: 98 BPM
WBC # BLD AUTO: 9.4 X10*3/UL (ref 4.4–11.3)

## 2024-09-19 PROCEDURE — 83690 ASSAY OF LIPASE: CPT

## 2024-09-19 PROCEDURE — 80053 COMPREHEN METABOLIC PANEL: CPT

## 2024-09-19 PROCEDURE — 85025 COMPLETE CBC W/AUTO DIFF WBC: CPT

## 2024-09-19 PROCEDURE — 96372 THER/PROPH/DIAG INJ SC/IM: CPT

## 2024-09-19 PROCEDURE — 2500000004 HC RX 250 GENERAL PHARMACY W/ HCPCS (ALT 636 FOR OP/ED)

## 2024-09-19 PROCEDURE — 93005 ELECTROCARDIOGRAM TRACING: CPT

## 2024-09-19 PROCEDURE — 71275 CT ANGIOGRAPHY CHEST: CPT

## 2024-09-19 PROCEDURE — 87502 INFLUENZA DNA AMP PROBE: CPT | Performed by: EMERGENCY MEDICINE

## 2024-09-19 PROCEDURE — 83605 ASSAY OF LACTIC ACID: CPT

## 2024-09-19 PROCEDURE — 84484 ASSAY OF TROPONIN QUANT: CPT

## 2024-09-19 PROCEDURE — 96367 TX/PROPH/DG ADDL SEQ IV INF: CPT | Mod: 59

## 2024-09-19 PROCEDURE — 2500000002 HC RX 250 W HCPCS SELF ADMINISTERED DRUGS (ALT 637 FOR MEDICARE OP, ALT 636 FOR OP/ED)

## 2024-09-19 PROCEDURE — 36415 COLL VENOUS BLD VENIPUNCTURE: CPT

## 2024-09-19 PROCEDURE — 87449 NOS EACH ORGANISM AG IA: CPT | Mod: GEALAB

## 2024-09-19 PROCEDURE — 81003 URINALYSIS AUTO W/O SCOPE: CPT

## 2024-09-19 PROCEDURE — 83735 ASSAY OF MAGNESIUM: CPT

## 2024-09-19 PROCEDURE — 2500000001 HC RX 250 WO HCPCS SELF ADMINISTERED DRUGS (ALT 637 FOR MEDICARE OP)

## 2024-09-19 PROCEDURE — 2550000001 HC RX 255 CONTRASTS: Performed by: EMERGENCY MEDICINE

## 2024-09-19 PROCEDURE — G0378 HOSPITAL OBSERVATION PER HR: HCPCS

## 2024-09-19 PROCEDURE — 87899 AGENT NOS ASSAY W/OPTIC: CPT | Mod: GEALAB

## 2024-09-19 PROCEDURE — 83880 ASSAY OF NATRIURETIC PEPTIDE: CPT

## 2024-09-19 PROCEDURE — 96361 HYDRATE IV INFUSION ADD-ON: CPT | Mod: 59

## 2024-09-19 PROCEDURE — 94760 N-INVAS EAR/PLS OXIMETRY 1: CPT

## 2024-09-19 PROCEDURE — 99222 1ST HOSP IP/OBS MODERATE 55: CPT

## 2024-09-19 PROCEDURE — 99285 EMERGENCY DEPT VISIT HI MDM: CPT | Mod: 25

## 2024-09-19 PROCEDURE — 96365 THER/PROPH/DIAG IV INF INIT: CPT | Mod: 59

## 2024-09-19 RX ORDER — LOSARTAN POTASSIUM 50 MG/1
50 TABLET ORAL DAILY
Status: DISCONTINUED | OUTPATIENT
Start: 2024-09-19 | End: 2024-09-20 | Stop reason: HOSPADM

## 2024-09-19 RX ORDER — CEFTRIAXONE 2 G/50ML
2 INJECTION, SOLUTION INTRAVENOUS ONCE
Status: COMPLETED | OUTPATIENT
Start: 2024-09-19 | End: 2024-09-19

## 2024-09-19 RX ORDER — CHOLECALCIFEROL (VITAMIN D3) 25 MCG
2000 TABLET ORAL DAILY
Status: DISCONTINUED | OUTPATIENT
Start: 2024-09-19 | End: 2024-09-20 | Stop reason: HOSPADM

## 2024-09-19 RX ORDER — CLORAZEPATE DIPOTASSIUM 15 MG/1
15 TABLET ORAL 2 TIMES DAILY
Status: DISCONTINUED | OUTPATIENT
Start: 2024-09-19 | End: 2024-09-20 | Stop reason: HOSPADM

## 2024-09-19 RX ORDER — AMOXICILLIN AND CLAVULANATE POTASSIUM 875; 125 MG/1; MG/1
1 TABLET, FILM COATED ORAL EVERY 12 HOURS SCHEDULED
Status: DISCONTINUED | OUTPATIENT
Start: 2024-09-19 | End: 2024-09-20 | Stop reason: HOSPADM

## 2024-09-19 RX ORDER — SENNOSIDES 8.6 MG/1
2 TABLET ORAL 2 TIMES DAILY
Status: DISCONTINUED | OUTPATIENT
Start: 2024-09-19 | End: 2024-09-20 | Stop reason: HOSPADM

## 2024-09-19 RX ORDER — HEPARIN SODIUM 5000 [USP'U]/ML
5000 INJECTION, SOLUTION INTRAVENOUS; SUBCUTANEOUS EVERY 8 HOURS
Status: DISCONTINUED | OUTPATIENT
Start: 2024-09-19 | End: 2024-09-20 | Stop reason: HOSPADM

## 2024-09-19 RX ORDER — ONDANSETRON 4 MG/1
4 TABLET, FILM COATED ORAL EVERY 8 HOURS PRN
Status: DISCONTINUED | OUTPATIENT
Start: 2024-09-19 | End: 2024-09-20 | Stop reason: HOSPADM

## 2024-09-19 RX ORDER — ACETAMINOPHEN 500 MG
5 TABLET ORAL NIGHTLY
Status: DISCONTINUED | OUTPATIENT
Start: 2024-09-19 | End: 2024-09-20 | Stop reason: HOSPADM

## 2024-09-19 RX ORDER — DIAZEPAM 5 MG/1
5 TABLET ORAL NIGHTLY
Status: COMPLETED | OUTPATIENT
Start: 2024-09-19 | End: 2024-09-19

## 2024-09-19 RX ORDER — ATORVASTATIN CALCIUM 40 MG/1
40 TABLET, FILM COATED ORAL NIGHTLY
Status: DISCONTINUED | OUTPATIENT
Start: 2024-09-19 | End: 2024-09-20 | Stop reason: HOSPADM

## 2024-09-19 RX ORDER — AZITHROMYCIN 500 MG/1
500 TABLET, FILM COATED ORAL
Status: DISCONTINUED | OUTPATIENT
Start: 2024-09-20 | End: 2024-09-20 | Stop reason: HOSPADM

## 2024-09-19 RX ORDER — CEFTRIAXONE 1 G/50ML
1 INJECTION, SOLUTION INTRAVENOUS ONCE
Status: DISCONTINUED | OUTPATIENT
Start: 2024-09-19 | End: 2024-09-19

## 2024-09-19 RX ORDER — CHOLECALCIFEROL (VITAMIN D3) 50 MCG
50 TABLET ORAL DAILY
COMMUNITY

## 2024-09-19 SDOH — SOCIAL STABILITY: SOCIAL INSECURITY: HAVE YOU HAD THOUGHTS OF HARMING ANYONE ELSE?: NO

## 2024-09-19 SDOH — SOCIAL STABILITY: SOCIAL INSECURITY: ABUSE: ADULT

## 2024-09-19 SDOH — SOCIAL STABILITY: SOCIAL INSECURITY: ARE YOU OR HAVE YOU BEEN THREATENED OR ABUSED PHYSICALLY, EMOTIONALLY, OR SEXUALLY BY ANYONE?: NO

## 2024-09-19 SDOH — SOCIAL STABILITY: SOCIAL INSECURITY: WERE YOU ABLE TO COMPLETE ALL THE BEHAVIORAL HEALTH SCREENINGS?: YES

## 2024-09-19 SDOH — SOCIAL STABILITY: SOCIAL INSECURITY: DO YOU FEEL UNSAFE GOING BACK TO THE PLACE WHERE YOU ARE LIVING?: NO

## 2024-09-19 SDOH — SOCIAL STABILITY: SOCIAL INSECURITY: DO YOU FEEL ANYONE HAS EXPLOITED OR TAKEN ADVANTAGE OF YOU FINANCIALLY OR OF YOUR PERSONAL PROPERTY?: NO

## 2024-09-19 SDOH — SOCIAL STABILITY: SOCIAL INSECURITY: HAS ANYONE EVER THREATENED TO HURT YOUR FAMILY OR YOUR PETS?: NO

## 2024-09-19 SDOH — SOCIAL STABILITY: SOCIAL INSECURITY: ARE THERE ANY APPARENT SIGNS OF INJURIES/BEHAVIORS THAT COULD BE RELATED TO ABUSE/NEGLECT?: NO

## 2024-09-19 SDOH — SOCIAL STABILITY: SOCIAL INSECURITY: HAVE YOU HAD ANY THOUGHTS OF HARMING ANYONE ELSE?: NO

## 2024-09-19 SDOH — SOCIAL STABILITY: SOCIAL INSECURITY: DOES ANYONE TRY TO KEEP YOU FROM HAVING/CONTACTING OTHER FRIENDS OR DOING THINGS OUTSIDE YOUR HOME?: NO

## 2024-09-19 ASSESSMENT — COGNITIVE AND FUNCTIONAL STATUS - GENERAL
MOBILITY SCORE: 24
MOBILITY SCORE: 24
DAILY ACTIVITIY SCORE: 24
EATING MEALS: A LITTLE
DAILY ACTIVITIY SCORE: 23
PATIENT BASELINE BEDBOUND: NO

## 2024-09-19 ASSESSMENT — PAIN SCALES - GENERAL
PAINLEVEL_OUTOF10: 0 - NO PAIN

## 2024-09-19 ASSESSMENT — ACTIVITIES OF DAILY LIVING (ADL)
FEEDING YOURSELF: INDEPENDENT
PATIENT'S MEMORY ADEQUATE TO SAFELY COMPLETE DAILY ACTIVITIES?: YES
ADEQUATE_TO_COMPLETE_ADL: YES
BATHING: INDEPENDENT
GROOMING: INDEPENDENT
DRESSING YOURSELF: INDEPENDENT
HEARING - LEFT EAR: DIFFICULTY WITH NOISE
ASSISTIVE_DEVICE: EYEGLASSES
JUDGMENT_ADEQUATE_SAFELY_COMPLETE_DAILY_ACTIVITIES: YES
TOILETING: INDEPENDENT
WALKS IN HOME: INDEPENDENT
HEARING - RIGHT EAR: DIFFICULTY WITH NOISE
LACK_OF_TRANSPORTATION: NO

## 2024-09-19 ASSESSMENT — LIFESTYLE VARIABLES
HOW OFTEN DO YOU HAVE A DRINK CONTAINING ALCOHOL: NEVER
HAVE YOU EVER FELT YOU SHOULD CUT DOWN ON YOUR DRINKING: NO
AUDIT-C TOTAL SCORE: 0
EVER HAD A DRINK FIRST THING IN THE MORNING TO STEADY YOUR NERVES TO GET RID OF A HANGOVER: NO
SKIP TO QUESTIONS 9-10: 1
HAVE PEOPLE ANNOYED YOU BY CRITICIZING YOUR DRINKING: NO
HOW MANY STANDARD DRINKS CONTAINING ALCOHOL DO YOU HAVE ON A TYPICAL DAY: PATIENT DOES NOT DRINK
AUDIT-C TOTAL SCORE: 0
TOTAL SCORE: 0
HOW OFTEN DO YOU HAVE 6 OR MORE DRINKS ON ONE OCCASION: NEVER
EVER FELT BAD OR GUILTY ABOUT YOUR DRINKING: NO

## 2024-09-19 ASSESSMENT — PATIENT HEALTH QUESTIONNAIRE - PHQ9
SUM OF ALL RESPONSES TO PHQ9 QUESTIONS 1 & 2: 0
2. FEELING DOWN, DEPRESSED OR HOPELESS: NOT AT ALL
1. LITTLE INTEREST OR PLEASURE IN DOING THINGS: NOT AT ALL

## 2024-09-19 ASSESSMENT — PAIN - FUNCTIONAL ASSESSMENT
PAIN_FUNCTIONAL_ASSESSMENT: 0-10
PAIN_FUNCTIONAL_ASSESSMENT: 0-10

## 2024-09-19 NOTE — H&P
"  HPI    Angel Garibay is a 84 y.o. male with a PMHx of HTN, HLD, Grade 1-2 follicular lymphoma s/p immunotherapy & chemotherapy who presented to [facility] on (09/19/24) with a chief complaint of fatigue and weakness with reduced oral intake over past 3 weekswho is admitted for CAP. Pt reports he received chemotherapy around 3-4 weeks before deciding to discontinue treatment 2/2 experiencing  lack of appetite,  fatigue/weakness and cough which still has weakness, lack of appetite now.  Pt states his cough has also progressively worsened since onset. No dyspnea though. Pt denies HA, CP, orthopnea, abdominal pain, fever, chills, SOB, N/V. Pt denies any sick contacts. No other issues reported. Doesn't have an appetite.     Chief Complaint   Patient presents with    Weakness, Gen     Pt presents to the ER with weakness, having no apatite, and feeling \"cruddy\". PT has a hx of non-hodgkin lymphoma and stopped taking his chemo x3 weeks ago. PT states that he didn't like the way it made him feel. Pt denies any shortness of breath or chest pain at this time.      ROS: 12 point review of systems negative with the exception of above.    ED Course:  Vitals: T: 99.3      BP: 164/78        HR:100       RR:19       SPO2: 94%    Labs:  - CBC: Hgb: 11.7  - RFP: Na: 134  - Blood glucose: 113  - Lactate: 1.8  - Trop: 7, 8  - BNP:85  - COVID, flu: negative   - EKG: Accelerated Junctional rhythm. Vent. rate has increased BY  32 BPM. Inferior infarct is now present. Inverted T waves have replaced nonspecific T wave abnormality in Inferior leads     Imaging:  - CT PE: No pulmonary embolism is identified 2. Bilateral diffuse pneumonia with patchy ground-glass infiltrates having an apical basal gradient     Intervention: In ED, patient received azithromycin, ceftriaxone, LR   Patient was then transferred to the floor for further management    PMH: mentioned above in HPI  PSH: mentioned above in HPI  FH: noncontributory " "    Social Hx:  - EtOH: Denies  - Tobacco: Denies  - Illicit Drugs: Denies  - Lives with: wife               Allergies: as documented in EMR  Meds: as documented in med rec  Past Medical History     Past Medical History:   Diagnosis Date    Anxiety     BPH (benign prostatic hyperplasia)     CAD (coronary artery disease)     HLD (hyperlipidemia)     Lymphedema     lower extremities    Paroxysmal SVT (supraventricular tachycardia) (CMS-HCC)     Personal history of non-Hodgkin lymphomas 10/07/2021    History of lymphoma    S/P drug eluting coronary stent placement       Surgical History     Past Surgical History:   Procedure Laterality Date    CARDIAC CATHETERIZATION      CHOLECYSTECTOMY      CORONARY STENT PLACEMENT      US GUIDED BIOPSY LYMPH NODE SUPERFICIAL  12/14/2023    US GUIDED BIOPSY LYMPH NODE SUPERFICIAL 12/14/2023 [facility] US     Family History   No family history on file.  Social History     Tobacco Use: Low Risk  (9/19/2024)    Patient History     Smoking Tobacco Use: Never     Smokeless Tobacco Use: Never     Passive Exposure: Not on file      Social History     Substance and Sexual Activity   Alcohol Use Never      Allergies   No Known Allergies   Meds    Scheduled medications  azithromycin, 500 mg, intravenous, Once      Continuous medications     PRN medications     Objective     Vitals  Visit Vitals  /63 (BP Location: Right arm, Patient Position: Sitting)   Pulse 96   Temp 37.4 °C (99.3 °F) (Temporal)   Resp 17   Ht 1.778 m (5' 10\")   Wt 109 kg (240 lb)   SpO2 97%   BMI 34.44 kg/m²   Smoking Status Never   BSA 2.32 m²        Physical Examination:  Constitutional: Appears stated age. In NAD.   HEENT: EOMI, clear sclera, moist mucous membranes  CV: RRR, No M/R/G  PULM: CTAB, no coughing or wheezing  ABDOMEN: Soft, NT/ND. No TTP. + BSx4.  SKIN: Normal Color, Warm, Dry, No Rashes   EXTREMITIES: Non-Tender, Full ROM, trace edema b/l  NEURO: A&O x 4, nonfocal neurological exam.  PSYCH: Normal Mood & " "Behavior    I/Os    Intake/Output Summary (Last 24 hours) at 9/19/2024 1428  Last data filed at 9/19/2024 1411  Gross per 24 hour   Intake 1050 ml   Output --   Net 1050 ml       Labs:   Results from last 72 hours   Lab Units 09/19/24  1134   SODIUM mmol/L 134*   POTASSIUM mmol/L 3.7   CHLORIDE mmol/L 100   CO2 mmol/L 25   BUN mg/dL 12   CREATININE mg/dL 1.00   GLUCOSE mg/dL 113*   CALCIUM mg/dL 9.1   ANION GAP mmol/L 13   EGFR mL/min/1.73m*2 74      Results from last 72 hours   Lab Units 09/19/24  1134   WBC AUTO x10*3/uL 9.4   HEMOGLOBIN g/dL 11.7*   HEMATOCRIT % 35.5*   PLATELETS AUTO x10*3/uL 329   NEUTROS PCT AUTO % 63.3   LYMPHS PCT AUTO % 8.4   MONOS PCT AUTO % 19.7   EOS PCT AUTO % 7.0      Lab Results   Component Value Date    CALCIUM 9.1 09/19/2024    PHOS 2.6 06/13/2024      No results found for: \"CRP\"   [unfilled]     Micro/ID:   No results found for the last 90 days.                   No lab exists for component: \"AGALPCRNB\"   .ID  No results found for: \"URINECULTURE\", \"BLOODCULT\", \"CSFCULTSMEAR\"  Images    CT angio chest for pulmonary embolism  Narrative: Interpreted By:  Erma Holly,   STUDY:  CT ANGIO CHEST FOR PULMONARY EMBOLISM;  9/19/2024 12:42 pm      INDICATION:  Signs/Symptoms:r/o pe.          COMPARISON:  08/05/2019      ACCESSION NUMBER(S):  [id number]      ORDERING CLINICIAN:  ELISSA JA      TECHNIQUE:  Helical data acquisition of the chest was obtained with intravenous  administration of 75 mL Omnipaque 350. Images were reformatted in  coronal and sagittal planes. Axial and coronal MIP images were  created and reviewed.      FINDINGS:  POTENTIAL LIMITATIONS OF THE STUDY: None      HEART AND VESSELS:  No discrete filling defects within the main pulmonary artery or its  branches.      Main pulmonary artery and its branches are normal in caliber.      The thoracic aorta is of normal course and caliber with mild patchy  vascular calcifications.      Coronary calcifications " and stents are noted.The study is not  optimized for evaluation of coronary arteries.      The cardiac chambers are not enlarged.      No evidence of pericardial effusion.      MEDIASTINUM AND RAPHAEL, LOWER NECK AND AXILLA:  The visualized thyroid gland is within normal limits.      No evidence of thoracic lymphadenopathy by CT criteria.      Esophagus appears within normal limits as seen.      LUNGS AND AIRWAYS:  The trachea and central airways are patent. No endobronchial lesion.      Patchy ground-glass infiltrates are present bilaterally with apical  basal gradient and new compared to the prior study. No pleural  effusion is noted.      UPPER ABDOMEN:  The visualized subdiaphragmatic structures demonstrate no remarkable  findings.      CHEST WALL AND OSSEOUS STRUCTURES:  There are no suspicious osseous lesions. Multilevel degenerative  changes are present      Impression: 1.  No pulmonary embolism is identified  2. Bilateral diffuse pneumonia with patchy ground-glass infiltrates  having an apical basal gradient          MACRO:  None      Signed by: Erma Holly 9/19/2024 1:02 PM  Dictation workstation:   [id number]  ECG 12 lead  Accelerated Junctional rhythm  Minimal voltage criteria for LVH, may be normal variant ( R in aVL )  Inferior infarct , age undetermined  Possible Anterior infarct , age undetermined  Abnormal ECG  When compared with ECG of 26-AUG-2015 13:47,  Junctional rhythm has replaced Sinus rhythm  Vent. rate has increased BY  32 BPM  Inferior infarct is now Present  Inverted T waves have replaced nonspecific T wave abnormality in Inferior leads    Assessment and Plan    Angel Garibay is a 84 y.o. male with a PMHx of HTN, HLD, Grade 1-2 follicular lymphoma s/p immunotherapy & chemotherapy who presented to [facility] on (09/19/24) with a chief complaint of fatigue, weakness admitted for CAP.     Acute Medical Issues   #Community Acquired Pneumonia  - CT PE: No pulmonary embolism is  identified. Bilateral diffuse pneumonia with patchy ground-glass infiltrates having an apical basal gradient   Plan:   - Ordered respiratory viral panel  - Ordered urine legionella, streptococcus pneumoniae antigen   - Start Augmentin 875-125 mg  - Start Azithromycin 500mg  - LR      Chronic Medical Issues   #HTN- continue home losartan   #HLD- continue home atorvastatin  #Nausea- continue ondansetron  #Trouble Sleeping- continue clorazepate    F: LR  E: Replete as needed  N: Regular diet  GI ppx: Protonix 40 mg daily  DVT ppx: heparin  Antibiotics: Augmentin, Azithromycin   Tubes/Lines/Drains: PIV    Code Status: Full code     Disposition: 84 y.o.male admitted for CAP. Anticipate LOS < 48 hrs.     Yrn Smith DO  Internal Medicine, PGY-1

## 2024-09-19 NOTE — ED PROVIDER NOTES
HPI   No chief complaint on file.      Patient is an 84-year-old male with significant PMH of non-Hodgkin's lymphoma, hyperlipidemia, 1 cardiac stent presents to the ED with cc of not feeling well x 3 weeks.  Patient states he stopped his IV chemo medication 3 weeks ago and a couple days after that he started not feeling well.  Patient states this has been ongoing since.  Patient describes as more weak and fatigued.  Patient states he is still able to ambulate around the house.  Patient has had decreased p.o. intake and decreased appetite.  Patient denies any fever chills congestion cough chest pain shortness of breath nausea vomiting abdominal pain diarrhea constipation numbness or tingling.  Patient denies any history of MIs blood clots recent travel or surgery.  Patient is not on any active treatment for his non-Hodgkin's lymphoma.  States he follows with Dr. Gleason for oncology.  Patient states he felt okay on the chemo and his last treatment he did not like and canceled the rest of his treatments.  Patient states that his age he would not have started chemo.  Patient denies any tobacco alcohol or street drug abuse.              Patient History   Past Medical History:   Diagnosis Date    Anxiety     BPH (benign prostatic hyperplasia)     CAD (coronary artery disease)     HLD (hyperlipidemia)     Lymphedema     lower extremities    Paroxysmal SVT (supraventricular tachycardia) (CMS-HCC)     Personal history of non-Hodgkin lymphomas 10/07/2021    History of lymphoma    S/P drug eluting coronary stent placement      Past Surgical History:   Procedure Laterality Date    CARDIAC CATHETERIZATION      CHOLECYSTECTOMY      CORONARY STENT PLACEMENT      US GUIDED BIOPSY LYMPH NODE SUPERFICIAL  12/14/2023    US GUIDED BIOPSY LYMPH NODE SUPERFICIAL 12/14/2023 [facility]     No family history on file.  Social History     Tobacco Use    Smoking status: Never    Smokeless tobacco: Never   Vaping Use    Vaping status: Never Used    Substance Use Topics    Alcohol use: Never    Drug use: Never       Physical Exam   ED Triage Vitals   Temp Pulse Resp BP   -- -- -- --      SpO2 Temp src Heart Rate Source Patient Position   -- -- -- --      BP Location FiO2 (%)     -- --       Physical Exam  HENT:      Head: Normocephalic.   Eyes:      Extraocular Movements: Extraocular movements intact.      Pupils: Pupils are equal, round, and reactive to light.   Cardiovascular:      Rate and Rhythm: Regular rhythm. Tachycardia present.      Pulses: Normal pulses.   Pulmonary:      Effort: Pulmonary effort is normal.      Breath sounds: Normal breath sounds. No wheezing, rhonchi or rales.   Abdominal:      Palpations: Abdomen is soft.      Tenderness: There is no abdominal tenderness. There is no guarding or rebound.   Musculoskeletal:         General: Normal range of motion.      Cervical back: Normal range of motion.      Right lower leg: Edema present.      Left lower leg: Edema present.   Neurological:      General: No focal deficit present.      Mental Status: He is alert and oriented to person, place, and time. Mental status is at baseline.           ED Course & MDM   ED Course as of 09/19/24 1339   Thu Sep 19, 2024   1137 EKG interpretation performed at 1133 accelerated junctional rhythm normal axis no acute signs of ischemia.  Ventricular rate 98 bpm []   1303 EKG interpretation performed at 1259 sinus rhythm with PACs normal axis no acute signs of ischemia.  Ventricular rate 99 bpm []      ED Course User Index  [] Kristal Paula PA-C         Diagnoses as of 09/19/24 1339   Generalized weakness   Pneumonia of both lungs due to infectious organism, unspecified part of lung                 No data recorded                                 Medical Decision Making  Medical Decision Making:  Patient presented as described in HPI. Patient case including ROS, PE, and treatment and plan discussed with ED attending if attached as cosigner. Due to  patients presentation orders completed include as documented.  Patient presents to the ED with CC of weakness fatigue the last 3 weeks.  Patient states he stopped taking his chemo 3 weeks ago and a couple days after started feeling this way.  Patient follows with Dr. Gleason for oncology.  Patient states he chose to stop his chemo infusions.  Patient has had decreased p.o. intake and decrease in appetite.  Patient is nontoxic-appearing, abdomen is soft and nontender, lung sounds are clear bilaterally +1 pitting edema bilaterally.  Patient given fluids.  Pending labs and imaging.  Labs are unremarkable.  Imaging shows no PE, bilateral diffuse pneumonia with patchy groundglass infiltrates having apical basal gradient.  Patient educated his findings.  Shared decision making occurred and I recommended admission with immunocompromise and findings on imaging.  Patient is agreeable to this.  Patient started on IV antibiotics.  Patient remained stable pending admission.      Patient care discussed with: N/A  Social Determinants affecting care: N/A    Final diagnosis and disposition as below.  See CI    Hospitalize. I discussed the differential; results and admit plan with the patient and/or family/friend/caregiver if present.  I emphasized the importance of hospitalization need for re-evaluation/continued monitoring/interventions.. They agreed that if they feel their condition is worsening or if they have any other concern they should alert staff immediately for further assistance. I gave the patient an opportunity to ask all questions they had and answered all of them accordingly. The patient and/or family/friend/caregiver expressed understanding verbally and that they would comply.       Disposition:  admit    Hospitalize. Discussed findings and treatment done here in ED with admitting physician. It would be a risk to discharge the patient in their condition due to possibility of deterioration in their condition and the  need for urgent interventions.    This note has been transcribed using voice recognition and may contain grammatical errors, misplaced words, incorrect words, incorrect phrases or other errors.        Labs Reviewed   CBC WITH AUTO DIFFERENTIAL - Abnormal       Result Value    WBC 9.4      nRBC 0.0      RBC 3.75 (*)     Hemoglobin 11.7 (*)     Hematocrit 35.5 (*)     MCV 95      MCH 31.2      MCHC 33.0      RDW 13.0      Platelets 329      Neutrophils % 63.3      Immature Granulocytes %, Automated 0.6      Lymphocytes % 8.4      Monocytes % 19.7      Eosinophils % 7.0      Basophils % 1.0      Neutrophils Absolute 5.93 (*)     Immature Granulocytes Absolute, Automated 0.06      Lymphocytes Absolute 0.79 (*)     Monocytes Absolute 1.85 (*)     Eosinophils Absolute 0.66 (*)     Basophils Absolute 0.09     COMPREHENSIVE METABOLIC PANEL - Abnormal    Glucose 113 (*)     Sodium 134 (*)     Potassium 3.7      Chloride 100      Bicarbonate 25      Anion Gap 13      Urea Nitrogen 12      Creatinine 1.00      eGFR 74      Calcium 9.1      Albumin 3.5      Alkaline Phosphatase 110      Total Protein 6.6      AST 28      Bilirubin, Total 0.7      ALT 22     SARS-COV-2 PCR - Normal    Coronavirus 2019, PCR Not Detected      Narrative:     This assay has received FDA Emergency Use Authorization (EUA) and is only authorized for the duration of time that circumstances exist to justify the authorization of the emergency use of in vitro diagnostic tests for the detection of SARS-CoV-2 virus and/or diagnosis of COVID-19 infection under section 564(b)(1) of the Act, 21 U.S.C. 360bbb-3(b)(1). This assay is an in vitro diagnostic nucleic acid amplification test for the qualitative detection of SARS-CoV-2 from nasopharyngeal specimens and has been validated for use at [facility]. Negative results do not preclude COVID-19 infections and should not be used as the sole basis for diagnosis, treatment, or other  management decisions.     INFLUENZA A AND B PCR - Normal    Flu A Result Not Detected      Flu B Result Not Detected      Narrative:     This assay is an in vitro diagnostic multiplex nucleic acid amplification test for the detection and discrimination of Influenza A & B from nasopharyngeal specimens, and has been validated for use at [facility]. Negative results do not preclude Influenza A/B infections, and should not be used as the sole basis for diagnosis, treatment, or other management decisions. If Influenza A/B and RSV PCR results are negative, testing for Parainfluenza virus, Adenovirus and Metapneumovirus is routinely performed for [facility] pediatric oncology and intensive care inpatients, and is available on other patients by placing an add-on request.   MAGNESIUM - Normal    Magnesium 1.88     LIPASE - Normal    Lipase 18      Narrative:     Venipuncture immediately after or during the administration of Metamizole may lead to falsely low results. Testing should be performed immediately prior to Metamizole dosing.   LACTATE - Normal    Lactate 1.8      Narrative:     Venipuncture immediately after or during the administration of Metamizole may lead to falsely low results. Testing should be performed immediately prior to Metamizole dosing.   B-TYPE NATRIURETIC PEPTIDE - Normal    BNP 85      Narrative:        <100 pg/mL - Heart failure unlikely  100-299 pg/mL - Intermediate probability of acute heart                  failure exacerbation. Correlate with clinical                  context and patient history.    >=300 pg/mL - Heart Failure likely. Correlate with clinical                  context and patient history.    BNP testing is performed using different testing methodology at [facility] than at other [facility]. Direct result comparisons should only be made within the same method.      URINALYSIS WITH REFLEX CULTURE AND MICROSCOPIC - Normal    Color, Urine  Light-Yellow      Appearance, Urine Clear      Specific Gravity, Urine 1.029      pH, Urine 6.5      Protein, Urine NEGATIVE      Glucose, Urine Normal      Blood, Urine NEGATIVE      Ketones, Urine NEGATIVE      Bilirubin, Urine NEGATIVE      Urobilinogen, Urine Normal      Nitrite, Urine NEGATIVE      Leukocyte Esterase, Urine NEGATIVE     SERIAL TROPONIN-INITIAL - Normal    Troponin I, High Sensitivity 7      Narrative:     Less than 99th percentile of normal range cutoff-  Female and children under 18 years old <14 ng/L; Male <21 ng/L: Negative  Repeat testing should be performed if clinically indicated.     Female and children under 18 years old 14-50 ng/L; Male 21-50 ng/L:  Consistent with possible cardiac damage and possible increased clinical   risk. Serial measurements may help to assess extent of myocardial damage.     >50 ng/L: Consistent with cardiac damage, increased clinical risk and  myocardial infarction. Serial measurements may help assess extent of   myocardial damage.      NOTE: Children less than 1 year old may have higher baseline troponin   levels and results should be interpreted in conjunction with the overall   clinical context.     NOTE: Troponin I testing is performed using a different   testing methodology at [facility] than at other   [facility]. Direct result comparisons should only   be made within the same method.   SERIAL TROPONIN, 1 HOUR - Normal    Troponin I, High Sensitivity 8      Narrative:     Less than 99th percentile of normal range cutoff-  Female and children under 18 years old <14 ng/L; Male <21 ng/L: Negative  Repeat testing should be performed if clinically indicated.     Female and children under 18 years old 14-50 ng/L; Male 21-50 ng/L:  Consistent with possible cardiac damage and possible increased clinical   risk. Serial measurements may help to assess extent of myocardial damage.     >50 ng/L: Consistent with cardiac damage, increased clinical  risk and  myocardial infarction. Serial measurements may help assess extent of   myocardial damage.      NOTE: Children less than 1 year old may have higher baseline troponin   levels and results should be interpreted in conjunction with the overall   clinical context.     NOTE: Troponin I testing is performed using a different   testing methodology at [facility] than at other   [facility]. Direct result comparisons should only   be made within the same method.   TROPONIN SERIES- (INITIAL, 1 HR)    Narrative:     The following orders were created for panel order Troponin I Series, High Sensitivity (0, 1 HR).  Procedure                               Abnormality         Status                     ---------                               -----------         ------                     Troponin I, High Sensiti...[[id number]]  Normal              Final result               Troponin, High Sensitivi...[[id number]]  Normal              Final result                 Please view results for these tests on the individual orders.   URINALYSIS WITH REFLEX CULTURE AND MICROSCOPIC    Narrative:     The following orders were created for panel order Urinalysis with Reflex Culture and Microscopic.  Procedure                               Abnormality         Status                     ---------                               -----------         ------                     Urinalysis with Reflex C...[[id number]]  Normal              Final result               Extra Urine Gray Tube[[id number]]                            In process                   Please view results for these tests on the individual orders.   EXTRA URINE GRAY TUBE      CT angio chest for pulmonary embolism   Final Result   1.  No pulmonary embolism is identified   2. Bilateral diffuse pneumonia with patchy ground-glass infiltrates   having an apical basal gradient             MACRO:   None        Signed by: Erma Holly 9/19/2024 1:02 PM   Dictation  workstation:   [id number]         Procedure  Procedures     Kristal Paula PA-C  09/19/24 1344

## 2024-09-19 NOTE — CARE PLAN
The patient's goals for the shift include      The clinical goals for the shift include patient will remain safe     Problem: Fall/Injury  Goal: Not fall by end of shift  Outcome: Progressing  Goal: Be free from injury by end of the shift  Outcome: Progressing  Goal: Verbalize understanding of personal risk factors for fall in the hospital  Outcome: Progressing  Goal: Verbalize understanding of risk factor reduction measures to prevent injury from fall in the home  Outcome: Progressing  Goal: Use assistive devices by end of the shift  Outcome: Progressing  Goal: Pace activities to prevent fatigue by end of the shift  Outcome: Progressing     Problem: Pain - Adult  Goal: Verbalizes/displays adequate comfort level or baseline comfort level  Outcome: Progressing     Problem: Safety - Adult  Goal: Free from fall injury  Outcome: Progressing     Problem: Discharge Planning  Goal: Discharge to home or other facility with appropriate resources  Outcome: Progressing     Problem: Chronic Conditions and Co-morbidities  Goal: Patient's chronic conditions and co-morbidity symptoms are monitored and maintained or improved  Outcome: Progressing

## 2024-09-19 NOTE — PROGRESS NOTES
09/19/24 1423   Discharge Planning   Living Arrangements Spouse/significant other   Support Systems Spouse/significant other   Assistance Needed A&OX4; independent with ADLs with no DME; drives; room air baseline and currently room air   Type of Residence Private residence   Number of Stairs to Enter Residence 3   Number of Stairs Within Residence 0   Do you have animals or pets at home? No   Who is requesting discharge planning? Provider   Home or Post Acute Services None   Expected Discharge Disposition Home  (Patient and spouse deny any home going needs at this time)   Does the patient need discharge transport arranged? No   Financial Resource Strain   How hard is it for you to pay for the very basics like food, housing, medical care, and heating? Not hard   Housing Stability   In the last 12 months, was there a time when you were not able to pay the mortgage or rent on time? N   In the past 12 months, how many times have you moved where you were living? 1   At any time in the past 12 months, were you homeless or living in a shelter (including now)? N   Transportation Needs   In the past 12 months, has lack of transportation kept you from medical appointments or from getting medications? no   In the past 12 months, has lack of transportation kept you from meetings, work, or from getting things needed for daily living? No     09/19/2024 1425pm  Spoke with patient and patient's spouse bedside in ED

## 2024-09-20 ENCOUNTER — PHARMACY VISIT (OUTPATIENT)
Dept: PHARMACY | Facility: CLINIC | Age: 84
End: 2024-09-20
Payer: COMMERCIAL

## 2024-09-20 VITALS
OXYGEN SATURATION: 91 % | BODY MASS INDEX: 32.27 KG/M2 | RESPIRATION RATE: 18 BRPM | TEMPERATURE: 97.7 F | HEART RATE: 84 BPM | DIASTOLIC BLOOD PRESSURE: 58 MMHG | SYSTOLIC BLOOD PRESSURE: 101 MMHG | HEIGHT: 70 IN | WEIGHT: 225.4 LBS

## 2024-09-20 LAB
ALBUMIN SERPL BCP-MCNC: 3.1 G/DL (ref 3.4–5)
ANION GAP SERPL CALC-SCNC: 13 MMOL/L (ref 10–20)
ATRIAL RATE: 99 BPM
BUN SERPL-MCNC: 11 MG/DL (ref 6–23)
CALCIUM SERPL-MCNC: 8.3 MG/DL (ref 8.6–10.3)
CHLORIDE SERPL-SCNC: 101 MMOL/L (ref 98–107)
CO2 SERPL-SCNC: 25 MMOL/L (ref 21–32)
CREAT SERPL-MCNC: 0.89 MG/DL (ref 0.5–1.3)
EGFRCR SERPLBLD CKD-EPI 2021: 84 ML/MIN/1.73M*2
ERYTHROCYTE [DISTWIDTH] IN BLOOD BY AUTOMATED COUNT: 13 % (ref 11.5–14.5)
GLUCOSE SERPL-MCNC: 114 MG/DL (ref 74–99)
HCT VFR BLD AUTO: 30.9 % (ref 41–52)
HGB BLD-MCNC: 9.9 G/DL (ref 13.5–17.5)
HOLD SPECIMEN: NORMAL
LEGIONELLA AG UR QL: NEGATIVE
MAGNESIUM SERPL-MCNC: 1.85 MG/DL (ref 1.6–2.4)
MCH RBC QN AUTO: 30.7 PG (ref 26–34)
MCHC RBC AUTO-ENTMCNC: 32 G/DL (ref 32–36)
MCV RBC AUTO: 96 FL (ref 80–100)
NRBC BLD-RTO: 0 /100 WBCS (ref 0–0)
P AXIS: 81 DEGREES
P OFFSET: 165 MS
P ONSET: 130 MS
PHOSPHATE SERPL-MCNC: 2.9 MG/DL (ref 2.5–4.9)
PLATELET # BLD AUTO: 287 X10*3/UL (ref 150–450)
POTASSIUM SERPL-SCNC: 3.6 MMOL/L (ref 3.5–5.3)
PR INTERVAL: 170 MS
Q ONSET: 215 MS
QRS COUNT: 16 BEATS
QRS DURATION: 82 MS
QT INTERVAL: 338 MS
QTC CALCULATION(BAZETT): 433 MS
QTC FREDERICIA: 399 MS
R AXIS: 11 DEGREES
RBC # BLD AUTO: 3.22 X10*6/UL (ref 4.5–5.9)
S PNEUM AG UR QL: NEGATIVE
SODIUM SERPL-SCNC: 135 MMOL/L (ref 136–145)
T AXIS: 35 DEGREES
T OFFSET: 384 MS
VENTRICULAR RATE: 99 BPM
WBC # BLD AUTO: 8.9 X10*3/UL (ref 4.4–11.3)

## 2024-09-20 PROCEDURE — 80069 RENAL FUNCTION PANEL: CPT

## 2024-09-20 PROCEDURE — 83735 ASSAY OF MAGNESIUM: CPT

## 2024-09-20 PROCEDURE — 2500000004 HC RX 250 GENERAL PHARMACY W/ HCPCS (ALT 636 FOR OP/ED): Performed by: INTERNAL MEDICINE

## 2024-09-20 PROCEDURE — RXMED WILLOW AMBULATORY MEDICATION CHARGE

## 2024-09-20 PROCEDURE — 85027 COMPLETE CBC AUTOMATED: CPT

## 2024-09-20 PROCEDURE — 99239 HOSP IP/OBS DSCHRG MGMT >30: CPT

## 2024-09-20 PROCEDURE — 2500000004 HC RX 250 GENERAL PHARMACY W/ HCPCS (ALT 636 FOR OP/ED)

## 2024-09-20 PROCEDURE — 87632 RESP VIRUS 6-11 TARGETS: CPT | Performed by: INTERNAL MEDICINE

## 2024-09-20 PROCEDURE — G0378 HOSPITAL OBSERVATION PER HR: HCPCS

## 2024-09-20 PROCEDURE — 2500000001 HC RX 250 WO HCPCS SELF ADMINISTERED DRUGS (ALT 637 FOR MEDICARE OP)

## 2024-09-20 PROCEDURE — 36415 COLL VENOUS BLD VENIPUNCTURE: CPT

## 2024-09-20 PROCEDURE — 96361 HYDRATE IV INFUSION ADD-ON: CPT

## 2024-09-20 PROCEDURE — 96372 THER/PROPH/DIAG INJ SC/IM: CPT | Mod: 59

## 2024-09-20 RX ORDER — AMOXICILLIN AND CLAVULANATE POTASSIUM 875; 125 MG/1; MG/1
1 TABLET, FILM COATED ORAL EVERY 12 HOURS SCHEDULED
Qty: 6 TABLET | Refills: 0 | Status: SHIPPED | OUTPATIENT
Start: 2024-09-20 | End: 2024-09-23

## 2024-09-20 RX ORDER — AZITHROMYCIN 500 MG/1
500 TABLET, FILM COATED ORAL
Qty: 3 TABLET | Refills: 0 | Status: SHIPPED | OUTPATIENT
Start: 2024-09-21 | End: 2024-09-24

## 2024-09-20 RX ORDER — OMEPRAZOLE 20 MG/1
20 TABLET, DELAYED RELEASE ORAL DAILY
COMMUNITY
Start: 2024-09-20

## 2024-09-20 ASSESSMENT — COGNITIVE AND FUNCTIONAL STATUS - GENERAL
MOBILITY SCORE: 23
WALKING IN HOSPITAL ROOM: A LITTLE
DAILY ACTIVITIY SCORE: 24

## 2024-09-20 ASSESSMENT — PAIN - FUNCTIONAL ASSESSMENT: PAIN_FUNCTIONAL_ASSESSMENT: 0-10

## 2024-09-20 ASSESSMENT — PAIN SCALES - GENERAL: PAINLEVEL_OUTOF10: 0 - NO PAIN

## 2024-09-20 ASSESSMENT — ACTIVITIES OF DAILY LIVING (ADL): LACK_OF_TRANSPORTATION: NO

## 2024-09-20 NOTE — PROGRESS NOTES
09/20/24 0828   Discharge Planning   Living Arrangements Spouse/significant other   Support Systems Spouse/significant other   Assistance Needed Alert and oriented x 4; Independent with ADL's, No DME; Drives; Room air baseline and currently room air   Type of Residence Private residence   Number of Stairs to Enter Residence 3   Number of Stairs Within Residence 0   Do you have animals or pets at home? No   Who is requesting discharge planning? Provider   Expected Discharge Disposition Home   Does the patient need discharge transport arranged? No   Financial Resource Strain   How hard is it for you to pay for the very basics like food, housing, medical care, and heating? Not hard   Housing Stability   In the last 12 months, was there a time when you were not able to pay the mortgage or rent on time? N   In the past 12 months, how many times have you moved where you were living? 0   At any time in the past 12 months, were you homeless or living in a shelter (including now)? N   Transportation Needs   In the past 12 months, has lack of transportation kept you from medical appointments or from getting medications? no   In the past 12 months, has lack of transportation kept you from meetings, work, or from getting things needed for daily living? No   Patient Choice   Provider Choice list and CMS website (https://medicare.gov/care-compare#search) for post-acute Quality and Resource Measure Data were provided and reviewed with: Patient;Family   Patient / Family choosing to utilize agency / facility established prior to hospitalization No

## 2024-09-20 NOTE — HOSPITAL COURSE
Angel Garibay is a 84 y.o. 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 CT PE was done which showed bilateral diffuse pneumonia with patchy ground-glass infiltrates - patient had no URI symptoms, but was started on antibiotics with Augmentin and Azithromycin for CAP coverage. May also be a drug-induced pneumonitis picture 2/2 to recent chemotherapy and immunotherapy. Viral respiratory panel was collected and pending. Patient states he has had a chronic cough for months, seems like it may be consistent with GERD, discharged patient with omeprazole.    Last chemotherapy was 3-4 weeks ago, decided to discontinue treatment 2/2 experiencing  lack of appetite,  fatigue/weakness and cough which still has weakness, lack of appetite now.      Patient remained stable throughout admission. Given fluids and reported improvement of symptoms. Discharged with Augmentin/Azithromycin to complete a 5-day course to cover for CAP. Encouraged patient to continue to improve po intake. To follow up with PCP. Dr. Jurado.

## 2024-09-20 NOTE — DISCHARGE INSTRUCTIONS
Please, take your home medications as instructed after being discharged from the hospital.     NEW MEDICATIONS:  Please finish taking your antibiotics:  -Augmentin 1 tablet every 12 for 3 days  -Azithromycin 1 tablet daily for 3 days     We have prescribed omeprazole (Prilosec), take this pill daily to help prevent acid reflux/GERD    OTHER INSTRUCTIONS:  -Please try to improve intake of both food and fluids as decreased food/water intake likely caused many of your symptoms.    UPCOMING APPOINTMENTS:     Please, follow-up with your PCP, Dr. Jurado, within 7 to 14 days after leaving the hospital. /appointment services has been requested to make an appointment for you, however if you do not hear back from them within 1 to 2 days, please call your primary physician's office to schedule an appointment. Bring your photo ID and insurance card to your appointment.   [facility]  services can be reached at [phone].     If you experience any worsening symptoms or have any concerns, please contact your primary care provider to schedule an appointment. If you cannot get in touch with your primary care physician, please return to the nearest emergency room or urgent care clinic for an evaluation and treatment.     Thank you for choosing [facility] and allowing us to partake in your medical care!     - Your [facility] inpatient primary care team.

## 2024-09-20 NOTE — NURSING NOTE
Discharge instructions reviewed with patient. No questions at this time. Education given for new homegoing medications with type, uses, and most common side effects. Patient verbalized understanding. Handout on PNA given. IV removed. Discharged home in stable condition.

## 2024-09-20 NOTE — DISCHARGE SUMMARY
Discharge Diagnosis  Viral pneumonia vs. Drug-induced pneumonitis  Poor po intake  GERD    Issues Requiring Follow-Up  Viral pneumonia vs. Drug-induced pneumonitis  -Patient was asymptomatic, however CT scan was concerning for pneumonia. To finish a 5-day course of Augmentin/Azithromycin and follow up with PCP    Poor po intake  -Patient has had poor oral intake since last chemo/immunotherapy session - encouraged increased po intake and to follow up with PCP    GERD  -States he occasionally has heart burn, which decreases his appetite further - started on omeprazole -to follow up with PCP    Discharge Meds     Medication List      START taking these medications     amoxicillin-pot clavulanate 875-125 mg tablet; Commonly known as:   Augmentin; Take 1 tablet by mouth every 12 hours for 6 doses.   azithromycin 500 mg tablet; Commonly known as: Zithromax; Take 1 tablet   (500 mg) by mouth once every 24 hours for 3 days.; Start taking on:   September 21, 2024   omeprazole OTC 20 mg EC tablet; Commonly known as: PriLOSEC OTC; Take 1   tablet (20 mg) by mouth once daily. Do not crush, chew, or split.     CONTINUE taking these medications     atorvastatin 40 mg tablet; Commonly known as: Lipitor; atorvastatin 40   mg tablet  take 1 tablet by mouth once daily   clorazepate 15 mg tablet; Commonly known as: Tranxene; Take 1 tablet (15   mg) by mouth 2 times a day.   losartan 50 mg tablet; Commonly known as: Cozaar; Take 1 tablet (50 mg)   by mouth once daily.   ondansetron 4 mg tablet; Commonly known as: Zofran; Take 1 tablet (4 mg)   by mouth every 8 hours if needed for nausea or vomiting.   traMADol 50 mg tablet; Commonly known as: Ultram; Take 1 tablet (50 mg)   by mouth every 8 hours if needed for severe pain (7 - 10).   Vitamin D3 50 MCG (2000 UT) tablet; Generic drug: cholecalciferol       Test Results Pending At Discharge  Pending Labs       Order Current Status    Legionella Antigen, Urine In process    Respiratory  Viral Panel In process    Streptococcus pneumoniae Antigen, Urine In process            Hospital Course  Angel Garibay is a 84 y.o. 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 CT PE was done which showed bilateral diffuse pneumonia with patchy ground-glass infiltrates - patient had no URI symptoms, but was started on antibiotics with Augmentin and Azithromycin for CAP coverage. May also be a drug-induced pneumonitis picture 2/2 to recent chemotherapy and immunotherapy. Viral respiratory panel was collected and pending. Patient states he has had a chronic cough for months, seems like it may be consistent with GERD, discharged patient with omeprazole.    Last chemotherapy was 3-4 weeks ago, decided to discontinue treatment 2/2 experiencing  lack of appetite,  fatigue/weakness and cough which still has weakness, lack of appetite now.      Patient remained stable throughout admission. Given fluids and reported improvement of symptoms. Discharged with Augmentin/Azithromycin to complete a 5-day course to cover for CAP. Encouraged patient to continue to improve po intake. To follow up with PCP. Dr. Jurado.    Pertinent Physical Exam At Time of Discharge  Constitutional: Appears stated age. In NAD.   HEENT: EOMI, clear sclera, moist mucous membranes  CV: RRR, No M/R/G  PULM: CTAB, no coughing or wheezing  ABDOMEN: Soft, NT/ND. No TTP. + BSx4.  SKIN: Normal Color, Warm, Dry, No Rashes   EXTREMITIES: Non-Tender, Full ROM, trace edema b/l  NEURO: A&O x 4, nonfocal neurological exam.  PSYCH: Normal Mood & Behavior    Outpatient Follow-Up  No future appointments.  -Referral placed with PCP, Dr. Adrien Franz MD  Internal Medicine, PGY-1

## 2024-09-21 LAB
ADENOVIRUS RVP, VIRC: NOT DETECTED
ENTEROVIRUS/RHINOVIRUS RVP, VIRC: NOT DETECTED
HUMAN BOCAVIRUS RVP, VIRC: NOT DETECTED
HUMAN CORONAVIRUS RVP, VIRC: NOT DETECTED
INFLUENZA A , VIRC: NOT DETECTED
INFLUENZA A H1N1-09 , VIRC: NOT DETECTED
INFLUENZA B PCR, VIRC: NOT DETECTED
METAPNEUMOVIRUS , VIRC: NOT DETECTED
PARAINFLUENZA PCR, VIRC: NOT DETECTED
RSV PCR, RVP, VIRC: NOT DETECTED

## 2024-09-23 ENCOUNTER — PATIENT OUTREACH (OUTPATIENT)
Dept: PRIMARY CARE | Facility: CLINIC | Age: 84
End: 2024-09-23
Payer: MEDICARE

## 2024-09-23 LAB
ATRIAL RATE: 99 BPM
P AXIS: 81 DEGREES
P OFFSET: 165 MS
P OFFSET: 178 MS
P ONSET: 130 MS
P ONSET: 140 MS
PR INTERVAL: 170 MS
Q ONSET: 215 MS
Q ONSET: 225 MS
QRS COUNT: 16 BEATS
QRS COUNT: 16 BEATS
QRS DURATION: 82 MS
QRS DURATION: 84 MS
QT INTERVAL: 308 MS
QT INTERVAL: 338 MS
QTC CALCULATION(BAZETT): 393 MS
QTC CALCULATION(BAZETT): 433 MS
QTC FREDERICIA: 362 MS
QTC FREDERICIA: 399 MS
R AXIS: 11 DEGREES
R AXIS: 15 DEGREES
T AXIS: 35 DEGREES
T AXIS: 8 DEGREES
T OFFSET: 379 MS
T OFFSET: 384 MS
VENTRICULAR RATE: 98 BPM
VENTRICULAR RATE: 99 BPM

## 2024-09-23 NOTE — PROGRESS NOTES
Discharge Facility:[facility]   Discharge Diagnosis:Viral Pneumonia   Admission Date:9/19/24  Discharge Date: 9/20/24    PCP Appointment Date:TASK TO OFFICE STAFF   Specialist Appointment Date: N/A  Hospital Encounter and Summary Linked: Yes      Two attempts were made to reach patient within two business days after discharge. Voicemail left with contact information for patient to call back with any non-emergent questions or concerns.      Jory Orr LPN

## 2024-10-03 ENCOUNTER — PATIENT OUTREACH (OUTPATIENT)
Dept: PRIMARY CARE | Facility: CLINIC | Age: 84
End: 2024-10-03
Payer: MEDICARE

## 2024-10-14 ENCOUNTER — APPOINTMENT (OUTPATIENT)
Dept: HEMATOLOGY/ONCOLOGY | Facility: CLINIC | Age: 84
End: 2024-10-14
Payer: MEDICARE

## 2024-10-15 ENCOUNTER — APPOINTMENT (OUTPATIENT)
Dept: HEMATOLOGY/ONCOLOGY | Facility: CLINIC | Age: 84
End: 2024-10-15
Payer: MEDICARE

## 2024-10-16 ENCOUNTER — APPOINTMENT (OUTPATIENT)
Dept: HEMATOLOGY/ONCOLOGY | Facility: CLINIC | Age: 84
End: 2024-10-16
Payer: MEDICARE

## 2024-10-24 ENCOUNTER — PATIENT OUTREACH (OUTPATIENT)
Dept: PRIMARY CARE | Facility: CLINIC | Age: 84
End: 2024-10-24
Payer: MEDICARE

## 2024-11-11 ENCOUNTER — APPOINTMENT (OUTPATIENT)
Dept: HEMATOLOGY/ONCOLOGY | Facility: CLINIC | Age: 84
End: 2024-11-11
Payer: MEDICARE

## 2024-11-12 ENCOUNTER — APPOINTMENT (OUTPATIENT)
Dept: HEMATOLOGY/ONCOLOGY | Facility: CLINIC | Age: 84
End: 2024-11-12
Payer: MEDICARE

## 2024-11-13 ENCOUNTER — APPOINTMENT (OUTPATIENT)
Dept: HEMATOLOGY/ONCOLOGY | Facility: CLINIC | Age: 84
End: 2024-11-13
Payer: MEDICARE

## 2024-11-16 DIAGNOSIS — B02.9 HERPES ZOSTER WITHOUT COMPLICATION: Primary | ICD-10-CM

## 2024-11-16 RX ORDER — VALACYCLOVIR HYDROCHLORIDE 1 G/1
1000 TABLET, FILM COATED ORAL 3 TIMES DAILY
Qty: 21 TABLET | Refills: 0 | Status: SHIPPED | OUTPATIENT
Start: 2024-11-16 | End: 2024-11-23

## 2024-11-16 RX ORDER — GABAPENTIN 100 MG/1
100-300 CAPSULE ORAL NIGHTLY PRN
Qty: 60 CAPSULE | Refills: 0 | Status: SHIPPED | OUTPATIENT
Start: 2024-11-16 | End: 2025-05-15

## 2024-11-16 NOTE — PROGRESS NOTES
Reports pain on his right side around his waist. A few days later he developed vesicles and is painful. Rash does not cross midline. No fever or chills.     #Shingles  -Rx Valtrex 1g TID  x 7 days and roberta 100--300mg at bedtime prn     If rash worsens or he gets systemic sx he will go to ED or call my office

## 2024-11-30 ENCOUNTER — HOSPITAL ENCOUNTER (EMERGENCY)
Facility: HOSPITAL | Age: 84
Discharge: HOME | End: 2024-12-01
Attending: EMERGENCY MEDICINE
Payer: MEDICARE

## 2024-11-30 ENCOUNTER — APPOINTMENT (OUTPATIENT)
Dept: RADIOLOGY | Facility: HOSPITAL | Age: 84
End: 2024-11-30
Payer: MEDICARE

## 2024-11-30 DIAGNOSIS — R10.9 ABDOMINAL PAIN, UNSPECIFIED ABDOMINAL LOCATION: Primary | ICD-10-CM

## 2024-11-30 LAB
ALBUMIN SERPL BCP-MCNC: 4.4 G/DL (ref 3.4–5)
ALP SERPL-CCNC: 131 U/L (ref 33–136)
ALT SERPL W P-5'-P-CCNC: 26 U/L (ref 10–52)
ANION GAP SERPL CALC-SCNC: 15 MMOL/L (ref 10–20)
AST SERPL W P-5'-P-CCNC: 29 U/L (ref 9–39)
BASOPHILS # BLD AUTO: 0.02 X10*3/UL (ref 0–0.1)
BASOPHILS NFR BLD AUTO: 0.3 %
BILIRUB SERPL-MCNC: 0.8 MG/DL (ref 0–1.2)
BUN SERPL-MCNC: 14 MG/DL (ref 6–23)
CALCIUM SERPL-MCNC: 9.5 MG/DL (ref 8.6–10.3)
CHLORIDE SERPL-SCNC: 100 MMOL/L (ref 98–107)
CO2 SERPL-SCNC: 28 MMOL/L (ref 21–32)
CREAT SERPL-MCNC: 0.82 MG/DL (ref 0.5–1.3)
EGFRCR SERPLBLD CKD-EPI 2021: 86 ML/MIN/1.73M*2
EOSINOPHIL # BLD AUTO: 0.37 X10*3/UL (ref 0–0.4)
EOSINOPHIL NFR BLD AUTO: 6.2 %
ERYTHROCYTE [DISTWIDTH] IN BLOOD BY AUTOMATED COUNT: 13.1 % (ref 11.5–14.5)
GLUCOSE SERPL-MCNC: 100 MG/DL (ref 74–99)
HCT VFR BLD AUTO: 43.5 % (ref 41–52)
HGB BLD-MCNC: 13.9 G/DL (ref 13.5–17.5)
IMM GRANULOCYTES # BLD AUTO: 0.03 X10*3/UL (ref 0–0.5)
IMM GRANULOCYTES NFR BLD AUTO: 0.5 % (ref 0–0.9)
LACTATE SERPL-SCNC: 1.9 MMOL/L (ref 0.4–2)
LYMPHOCYTES # BLD AUTO: 0.66 X10*3/UL (ref 0.8–3)
LYMPHOCYTES NFR BLD AUTO: 11 %
MAGNESIUM SERPL-MCNC: 1.85 MG/DL (ref 1.6–2.4)
MCH RBC QN AUTO: 30.5 PG (ref 26–34)
MCHC RBC AUTO-ENTMCNC: 32 G/DL (ref 32–36)
MCV RBC AUTO: 95 FL (ref 80–100)
MONOCYTES # BLD AUTO: 0.9 X10*3/UL (ref 0.05–0.8)
MONOCYTES NFR BLD AUTO: 15 %
NEUTROPHILS # BLD AUTO: 4.02 X10*3/UL (ref 1.6–5.5)
NEUTROPHILS NFR BLD AUTO: 67 %
NRBC BLD-RTO: 0 /100 WBCS (ref 0–0)
PLATELET # BLD AUTO: 270 X10*3/UL (ref 150–450)
POTASSIUM SERPL-SCNC: 3.8 MMOL/L (ref 3.5–5.3)
PROT SERPL-MCNC: 6.6 G/DL (ref 6.4–8.2)
RBC # BLD AUTO: 4.56 X10*6/UL (ref 4.5–5.9)
SODIUM SERPL-SCNC: 139 MMOL/L (ref 136–145)
WBC # BLD AUTO: 6 X10*3/UL (ref 4.4–11.3)

## 2024-11-30 PROCEDURE — 74177 CT ABD & PELVIS W/CONTRAST: CPT | Performed by: RADIOLOGY

## 2024-11-30 PROCEDURE — 83735 ASSAY OF MAGNESIUM: CPT | Performed by: EMERGENCY MEDICINE

## 2024-11-30 PROCEDURE — 83605 ASSAY OF LACTIC ACID: CPT | Performed by: EMERGENCY MEDICINE

## 2024-11-30 PROCEDURE — 96361 HYDRATE IV INFUSION ADD-ON: CPT

## 2024-11-30 PROCEDURE — 99284 EMERGENCY DEPT VISIT MOD MDM: CPT | Mod: 25

## 2024-11-30 PROCEDURE — 74177 CT ABD & PELVIS W/CONTRAST: CPT

## 2024-11-30 PROCEDURE — 85025 COMPLETE CBC W/AUTO DIFF WBC: CPT | Performed by: EMERGENCY MEDICINE

## 2024-11-30 PROCEDURE — 2500000004 HC RX 250 GENERAL PHARMACY W/ HCPCS (ALT 636 FOR OP/ED): Performed by: EMERGENCY MEDICINE

## 2024-11-30 PROCEDURE — 80053 COMPREHEN METABOLIC PANEL: CPT | Performed by: EMERGENCY MEDICINE

## 2024-11-30 PROCEDURE — 2550000001 HC RX 255 CONTRASTS: Performed by: EMERGENCY MEDICINE

## 2024-11-30 PROCEDURE — 36415 COLL VENOUS BLD VENIPUNCTURE: CPT | Performed by: EMERGENCY MEDICINE

## 2024-11-30 ASSESSMENT — PAIN - FUNCTIONAL ASSESSMENT: PAIN_FUNCTIONAL_ASSESSMENT: 0-10

## 2024-11-30 ASSESSMENT — PAIN SCALES - GENERAL: PAINLEVEL_OUTOF10: 8

## 2024-11-30 ASSESSMENT — PAIN DESCRIPTION - LOCATION: LOCATION: ABDOMEN

## 2024-12-01 VITALS
TEMPERATURE: 97.7 F | RESPIRATION RATE: 18 BRPM | BODY MASS INDEX: 34.36 KG/M2 | HEART RATE: 96 BPM | SYSTOLIC BLOOD PRESSURE: 140 MMHG | HEIGHT: 70 IN | DIASTOLIC BLOOD PRESSURE: 68 MMHG | WEIGHT: 240 LBS | OXYGEN SATURATION: 99 %

## 2024-12-01 PROBLEM — R10.9 ABDOMINAL PAIN: Status: ACTIVE | Noted: 2024-12-01

## 2024-12-01 LAB
APPEARANCE UR: CLEAR
BILIRUB UR STRIP.AUTO-MCNC: NEGATIVE MG/DL
COLOR UR: YELLOW
GLUCOSE UR STRIP.AUTO-MCNC: NEGATIVE MG/DL
HOLD SPECIMEN: NORMAL
KETONES UR STRIP.AUTO-MCNC: ABNORMAL MG/DL
LEUKOCYTE ESTERASE UR QL STRIP.AUTO: NEGATIVE
MUCOUS THREADS #/AREA URNS AUTO: NORMAL /LPF
NITRITE UR QL STRIP.AUTO: NEGATIVE
PH UR STRIP.AUTO: 5.5 [PH]
PROT UR STRIP.AUTO-MCNC: ABNORMAL MG/DL
RBC # UR STRIP.AUTO: NEGATIVE /UL
RBC #/AREA URNS AUTO: NORMAL /HPF
SP GR UR STRIP.AUTO: 1.01
UROBILINOGEN UR STRIP.AUTO-MCNC: ABNORMAL MG/DL
WBC #/AREA URNS AUTO: NORMAL /HPF

## 2024-12-01 PROCEDURE — 81001 URINALYSIS AUTO W/SCOPE: CPT | Performed by: EMERGENCY MEDICINE

## 2024-12-01 PROCEDURE — 96374 THER/PROPH/DIAG INJ IV PUSH: CPT

## 2024-12-01 PROCEDURE — 2500000004 HC RX 250 GENERAL PHARMACY W/ HCPCS (ALT 636 FOR OP/ED): Performed by: EMERGENCY MEDICINE

## 2024-12-01 RX ORDER — PANTOPRAZOLE SODIUM 40 MG/10ML
80 INJECTION, POWDER, LYOPHILIZED, FOR SOLUTION INTRAVENOUS ONCE
Status: COMPLETED | OUTPATIENT
Start: 2024-12-01 | End: 2024-12-01

## 2024-12-01 NOTE — ED PROVIDER NOTES
HPI   Chief Complaint   Patient presents with    Abdominal Pain    Black or Bloody Stool       84-year-old male here for chief complaint of stomach pain and black or bloody stool.  Patient has been on valacyclovir for shingles outbreak and is done taking it.  He states that the medication has been hurting his stomach.  He noticed his stool was black in color.  He has not been taking Pepto-Bismol.  He denies any fevers or chills.  He denies any diarrhea nausea or vomiting.    Past medical history is reviewed              Patient History   Past Medical History:   Diagnosis Date    Anxiety     BPH (benign prostatic hyperplasia)     CAD (coronary artery disease)     HLD (hyperlipidemia)     Lymphedema     lower extremities    Paroxysmal SVT (supraventricular tachycardia) (CMS-HCC)     Personal history of non-Hodgkin lymphomas 10/07/2021    History of lymphoma    S/P drug eluting coronary stent placement      Past Surgical History:   Procedure Laterality Date    CARDIAC CATHETERIZATION      CHOLECYSTECTOMY      CORONARY STENT PLACEMENT      US GUIDED BIOPSY LYMPH NODE SUPERFICIAL  12/14/2023    US GUIDED BIOPSY LYMPH NODE SUPERFICIAL 12/14/2023 [facility] US     No family history on file.  Social History     Tobacco Use    Smoking status: Never    Smokeless tobacco: Never   Vaping Use    Vaping status: Never Used   Substance Use Topics    Alcohol use: Never    Drug use: Never       Physical Exam   ED Triage Vitals [11/30/24 2221]   Temperature Heart Rate Respirations BP   36.5 °C (97.7 °F) 96 18 167/76      Pulse Ox Temp src Heart Rate Source Patient Position   100 % -- -- --      BP Location FiO2 (%)     -- --       Physical Exam  Vitals and nursing note reviewed.   Constitutional:       Appearance: Normal appearance.   HENT:      Head: Normocephalic and atraumatic.      Nose: Nose normal.      Mouth/Throat:      Mouth: Mucous membranes are moist.   Eyes:      Extraocular Movements: Extraocular movements intact.      Pupils:  Pupils are equal, round, and reactive to light.   Cardiovascular:      Rate and Rhythm: Normal rate and regular rhythm.   Pulmonary:      Effort: Pulmonary effort is normal.      Breath sounds: Normal breath sounds.   Abdominal:      General: Abdomen is flat.      Palpations: Abdomen is soft.   Musculoskeletal:         General: Normal range of motion.      Cervical back: Normal range of motion.   Skin:     General: Skin is warm and dry.      Capillary Refill: Capillary refill takes less than 2 seconds.   Neurological:      General: No focal deficit present.      Mental Status: He is alert.   Psychiatric:         Mood and Affect: Mood normal.           ED Course & MDM   ED Course as of 12/01/24 0055   Sun Dec 01, 2024   0052 Patient was fully worked up and lab work is reassuring, CT scan shows no active bleeding or acute process.  He was given Protonix IV.  I feel he can be safely discharged at this time with close follow-up. [TP]   0052 Patient agrees with the plan of care.  Differential includes gastritis ulcer bleeding, diverticulitis [TP]   0053 Considered ultrasound abdomen [TP]      ED Course User Index  [TP] Merna Fay DO         Diagnoses as of 12/01/24 0055   Abdominal pain, unspecified abdominal location                 No data recorded                                 Medical Decision Making  Medical Decision Making: Patient was worked up and has a normal H&H.  [unfilled]   Patient will take his Prevacid he has at home for acid production management.  Merna Fay D.O.  Emergency Medicine          Procedure  Procedures     Merna Fay DO  12/01/24 0055

## 2024-12-12 ENCOUNTER — APPOINTMENT (OUTPATIENT)
Dept: PRIMARY CARE | Facility: CLINIC | Age: 84
End: 2024-12-12
Payer: MEDICARE

## 2024-12-12 VITALS
BODY MASS INDEX: 30.56 KG/M2 | WEIGHT: 213 LBS | OXYGEN SATURATION: 95 % | DIASTOLIC BLOOD PRESSURE: 69 MMHG | SYSTOLIC BLOOD PRESSURE: 115 MMHG

## 2024-12-12 DIAGNOSIS — C82.18 GRADE 2 FOLLICULAR LYMPHOMA OF LYMPH NODES OF MULTIPLE REGIONS (MULTI): ICD-10-CM

## 2024-12-12 DIAGNOSIS — F41.9 ANXIETY: ICD-10-CM

## 2024-12-12 DIAGNOSIS — B02.9 HERPES ZOSTER WITHOUT COMPLICATION: ICD-10-CM

## 2024-12-12 DIAGNOSIS — E78.5 HYPERLIPIDEMIA, UNSPECIFIED HYPERLIPIDEMIA TYPE: ICD-10-CM

## 2024-12-12 DIAGNOSIS — I10 ESSENTIAL HYPERTENSION: Primary | ICD-10-CM

## 2024-12-12 DIAGNOSIS — G89.29 OTHER CHRONIC PAIN: ICD-10-CM

## 2024-12-12 PROCEDURE — 80368 SEDATIVE HYPNOTICS: CPT

## 2024-12-12 PROCEDURE — 3074F SYST BP LT 130 MM HG: CPT | Performed by: INTERNAL MEDICINE

## 2024-12-12 PROCEDURE — 1170F FXNL STATUS ASSESSED: CPT | Performed by: INTERNAL MEDICINE

## 2024-12-12 PROCEDURE — 80346 BENZODIAZEPINES1-12: CPT

## 2024-12-12 PROCEDURE — 80361 OPIATES 1 OR MORE: CPT

## 2024-12-12 PROCEDURE — 3078F DIAST BP <80 MM HG: CPT | Performed by: INTERNAL MEDICINE

## 2024-12-12 PROCEDURE — 80358 DRUG SCREENING METHADONE: CPT

## 2024-12-12 PROCEDURE — 1126F AMNT PAIN NOTED NONE PRSNT: CPT | Performed by: INTERNAL MEDICINE

## 2024-12-12 PROCEDURE — 80365 DRUG SCREENING OXYCODONE: CPT

## 2024-12-12 PROCEDURE — 80354 DRUG SCREENING FENTANYL: CPT

## 2024-12-12 PROCEDURE — 1036F TOBACCO NON-USER: CPT | Performed by: INTERNAL MEDICINE

## 2024-12-12 PROCEDURE — 99214 OFFICE O/P EST MOD 30 MIN: CPT | Performed by: INTERNAL MEDICINE

## 2024-12-12 PROCEDURE — 1159F MED LIST DOCD IN RCRD: CPT | Performed by: INTERNAL MEDICINE

## 2024-12-12 PROCEDURE — 80373 DRUG SCREENING TRAMADOL: CPT

## 2024-12-12 RX ORDER — CLORAZEPATE DIPOTASSIUM 15 MG/1
15 TABLET ORAL 2 TIMES DAILY
Qty: 180 TABLET | Refills: 1 | Status: CANCELLED | OUTPATIENT
Start: 2024-12-12

## 2024-12-12 RX ORDER — LOSARTAN POTASSIUM 50 MG/1
50 TABLET ORAL DAILY
Qty: 90 TABLET | Refills: 2 | Status: SHIPPED | OUTPATIENT
Start: 2024-12-12

## 2024-12-12 RX ORDER — CLORAZEPATE DIPOTASSIUM 15 MG/1
7.5 TABLET ORAL NIGHTLY
Qty: 45 TABLET | Refills: 1 | Status: SHIPPED | OUTPATIENT
Start: 2024-12-12 | End: 2024-12-12 | Stop reason: ALTCHOICE

## 2024-12-12 RX ORDER — CLORAZEPATE DIPOTASSIUM 15 MG/1
7.5 TABLET ORAL NIGHTLY
Qty: 45 TABLET | Refills: 1 | Status: SHIPPED | OUTPATIENT
Start: 2024-12-12

## 2024-12-12 RX ORDER — ATORVASTATIN CALCIUM 40 MG/1
TABLET, FILM COATED ORAL
Qty: 90 TABLET | Refills: 2 | Status: SHIPPED | OUTPATIENT
Start: 2024-12-12

## 2024-12-12 RX ORDER — GABAPENTIN 300 MG/1
300 CAPSULE ORAL 3 TIMES DAILY
Qty: 90 CAPSULE | Refills: 5 | Status: SHIPPED | OUTPATIENT
Start: 2024-12-12 | End: 2025-06-10

## 2024-12-12 ASSESSMENT — ENCOUNTER SYMPTOMS
DEPRESSION: 0
OCCASIONAL FEELINGS OF UNSTEADINESS: 0
LOSS OF SENSATION IN FEET: 0

## 2024-12-12 ASSESSMENT — COLUMBIA-SUICIDE SEVERITY RATING SCALE - C-SSRS
6. HAVE YOU EVER DONE ANYTHING, STARTED TO DO ANYTHING, OR PREPARED TO DO ANYTHING TO END YOUR LIFE?: NO
2. HAVE YOU ACTUALLY HAD ANY THOUGHTS OF KILLING YOURSELF?: NO
1. IN THE PAST MONTH, HAVE YOU WISHED YOU WERE DEAD OR WISHED YOU COULD GO TO SLEEP AND NOT WAKE UP?: NO

## 2024-12-12 ASSESSMENT — ACTIVITIES OF DAILY LIVING (ADL)
GROCERY_SHOPPING: INDEPENDENT
PATIENT'S MEMORY ADEQUATE TO SAFELY COMPLETE DAILY ACTIVITIES?: YES
WALKS IN HOME: INDEPENDENT
JUDGMENT_ADEQUATE_SAFELY_COMPLETE_DAILY_ACTIVITIES: YES
TOILETING: INDEPENDENT
MANAGING_FINANCES: INDEPENDENT
TAKING_MEDICATION: INDEPENDENT
DOING_HOUSEWORK: INDEPENDENT
DRESSING YOURSELF: INDEPENDENT
FEEDING YOURSELF: INDEPENDENT
BATHING: INDEPENDENT
GROOMING: INDEPENDENT
HEARING - RIGHT EAR: DIFFICULTY WITH NOISE
HEARING - LEFT EAR: DIFFICULTY WITH NOISE
ADEQUATE_TO_COMPLETE_ADL: YES

## 2024-12-12 ASSESSMENT — PAIN SCALES - GENERAL: PAINLEVEL_OUTOF10: 0-NO PAIN

## 2024-12-12 NOTE — PROGRESS NOTES
Subjective   Angel Garibay is a 84 y.o. male.    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  Patient had a recent shingles infection for which he completed antiviral treatment for.  He continues to have pain in his abdominal region at the site of infection, was given gabapentin which has provided minimal improvement.  He reports taking some pain meds he got for prior dental procedure which improved pain.  Patient went to ED in November for abdominal pain which was suspected be due to shingles flare on his abdomen or due to his antiviral medication.  CT of the abdomen showed nonspecific fat stranding along the mesenteric root which could be a nonspecific finding representing mesenteric adenitis/panniculitis versus neoplastic process such as lymphocytic infiltrate.  No other acute findings were noted.    Of note, patient states he has stopped his chemotherapy treatment back in September due to side effects of poor appetite.  He has not followed up with oncology since discontinuing medication.    Patient reports anxiety is well-controlled with clorazepate which he takes nightly. Greatly improves his quality of life. Sleep is good. No confusion or ataxia. No signs of abuse or misuse     No other acute complaints like CP, SOB, n/v, f/c, headaches, diarrhea, or LOC. No recent falls    ROS  12 point ROS otherwise negative    Objective   Vitals reviewed.   Constitutional:       Appearance: Not in distress.   Eyes:      Conjunctiva/sclera: Conjunctivae normal.   Pulmonary:      Effort: Pulmonary effort is normal.      Comments: Mild wheezing in RLL  Cardiovascular:      Normal rate. Regular rhythm.      Murmurs: There is no murmur.   Edema:     Peripheral edema present.     Comments: Mild bilateral edema, R>L  Musculoskeletal: Normal range of  motion.      Cervical back: Normal range of motion. Skin:     General: Skin is warm and dry.   Neurological:      General: No focal deficit present.      Mental Status: Alert and oriented to person, place and time.       Assessment/Plan   There were no encounter diagnoses.  #Anxiety and insomnia   -well controlled  -Cont 1/2 tab of Clorazepate 15mg nightly  -Urine drug screen 12/12/24  -CSA 8/7/23  -I have personally reviewed the OARRS report for . This report is scanned into the electronic medical record. I have considered the risks of abuse, dependence, addiction and diversion.       #Hodgkin's lymphoma   -has self discontinued chemotherapy in 9/2024 due to side effects  -Request to oncology for follow up appt made  - will need to address findings on CT abd with oncology    # Shingles  - completed anti-viral  - will increase gabapentin to 300 TID for pain control     #HTN  -Well contolled. Con losartan 50mg daily and metoprolol XL 50mg daily       #HLD  -Cont atorvastatin 40mg daily    Influenza: recommended  COVID: x5  Pneumovax 20: recommended  Shingrix: recommended the following year  RSV: Recommended  PSA: NI  Colorectal ca: NI  Lung ca screening: NI

## 2024-12-12 NOTE — PATIENT INSTRUCTIONS
Please follow up with your oncologist regarding cancer monitoring  Please take 300 mg gabapentin three times a day for shingles pain    Please return to clinic in 6 mo

## 2024-12-17 ENCOUNTER — APPOINTMENT (OUTPATIENT)
Dept: HEMATOLOGY/ONCOLOGY | Facility: CLINIC | Age: 84
End: 2024-12-17
Payer: MEDICARE

## 2024-12-17 LAB
1OH-MIDAZOLAM UR CFM-MCNC: <25 NG/ML
6MAM UR CFM-MCNC: <25 NG/ML
7AMINOCLONAZEPAM UR CFM-MCNC: <25 NG/ML
A-OH ALPRAZ UR CFM-MCNC: <25 NG/ML
ALPRAZ UR CFM-MCNC: <25 NG/ML
CHLORDIAZEP UR CFM-MCNC: <25 NG/ML
CLONAZEPAM UR CFM-MCNC: <25 NG/ML
CODEINE UR CFM-MCNC: <50 NG/ML
DIAZEPAM UR CFM-MCNC: <25 NG/ML
EDDP UR CFM-MCNC: <25 NG/ML
FENTANYL UR CFM-MCNC: <2.5 NG/ML
HYDROCODONE CTO UR CFM-MCNC: 182 NG/ML
HYDROMORPHONE UR CFM-MCNC: 252 NG/ML
LORAZEPAM UR CFM-MCNC: <25 NG/ML
METHADONE UR CFM-MCNC: <25 NG/ML
MIDAZOLAM UR CFM-MCNC: <25 NG/ML
MORPHINE UR CFM-MCNC: <50 NG/ML
NORDIAZEPAM UR CFM-MCNC: <25 NG/ML
NORFENTANYL UR CFM-MCNC: <2.5 NG/ML
NORHYDROCODONE UR CFM-MCNC: 901 NG/ML
NOROXYCODONE UR CFM-MCNC: <25 NG/ML
NORTRAMADOL UR-MCNC: <50 NG/ML
OXAZEPAM UR CFM-MCNC: >1000 NG/ML
OXYCODONE UR CFM-MCNC: <25 NG/ML
OXYMORPHONE UR CFM-MCNC: <25 NG/ML
TEMAZEPAM UR CFM-MCNC: <25 NG/ML
TRAMADOL UR CFM-MCNC: <50 NG/ML
ZOLPIDEM UR CFM-MCNC: <25 NG/ML
ZOLPIDEM UR-MCNC: <25 NG/ML

## 2024-12-27 DIAGNOSIS — B02.9 HERPES ZOSTER WITHOUT COMPLICATION: ICD-10-CM

## 2024-12-27 RX ORDER — GABAPENTIN 600 MG/1
600 TABLET ORAL 3 TIMES DAILY
Qty: 90 TABLET | Refills: 0 | Status: SHIPPED | OUTPATIENT
Start: 2024-12-27 | End: 2025-12-27

## 2024-12-30 ENCOUNTER — APPOINTMENT (OUTPATIENT)
Dept: RADIOLOGY | Facility: HOSPITAL | Age: 84
End: 2024-12-30
Payer: MEDICARE

## 2024-12-30 ENCOUNTER — HOSPITAL ENCOUNTER (EMERGENCY)
Facility: HOSPITAL | Age: 84
Discharge: HOME | End: 2024-12-30
Attending: STUDENT IN AN ORGANIZED HEALTH CARE EDUCATION/TRAINING PROGRAM
Payer: MEDICARE

## 2024-12-30 VITALS
SYSTOLIC BLOOD PRESSURE: 133 MMHG | DIASTOLIC BLOOD PRESSURE: 74 MMHG | TEMPERATURE: 97.2 F | BODY MASS INDEX: 32.93 KG/M2 | RESPIRATION RATE: 16 BRPM | WEIGHT: 230 LBS | HEIGHT: 70 IN | HEART RATE: 100 BPM | OXYGEN SATURATION: 95 %
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
## 2024-12-30 DIAGNOSIS — S42.002A CLOSED NONDISPLACED FRACTURE OF LEFT CLAVICLE, UNSPECIFIED PART OF CLAVICLE, INITIAL ENCOUNTER: ICD-10-CM

## 2024-12-30 PROCEDURE — 96374 THER/PROPH/DIAG INJ IV PUSH: CPT

## 2024-12-30 PROCEDURE — 71045 X-RAY EXAM CHEST 1 VIEW: CPT | Performed by: RADIOLOGY

## 2024-12-30 PROCEDURE — 70450 CT HEAD/BRAIN W/O DYE: CPT

## 2024-12-30 PROCEDURE — 74177 CT ABD & PELVIS W/CONTRAST: CPT | Performed by: RADIOLOGY

## 2024-12-30 PROCEDURE — 71260 CT THORAX DX C+: CPT | Performed by: RADIOLOGY

## 2024-12-30 PROCEDURE — 71045 X-RAY EXAM CHEST 1 VIEW: CPT

## 2024-12-30 PROCEDURE — 72170 X-RAY EXAM OF PELVIS: CPT | Performed by: RADIOLOGY

## 2024-12-30 PROCEDURE — 2550000001 HC RX 255 CONTRASTS: Performed by: STUDENT IN AN ORGANIZED HEALTH CARE EDUCATION/TRAINING PROGRAM

## 2024-12-30 PROCEDURE — 72170 X-RAY EXAM OF PELVIS: CPT

## 2024-12-30 PROCEDURE — 72125 CT NECK SPINE W/O DYE: CPT

## 2024-12-30 PROCEDURE — 99285 EMERGENCY DEPT VISIT HI MDM: CPT | Mod: 25 | Performed by: STUDENT IN AN ORGANIZED HEALTH CARE EDUCATION/TRAINING PROGRAM

## 2024-12-30 PROCEDURE — G0390 TRAUMA RESPONS W/HOSP CRITI: HCPCS

## 2024-12-30 PROCEDURE — 74177 CT ABD & PELVIS W/CONTRAST: CPT

## 2024-12-30 PROCEDURE — 2500000001 HC RX 250 WO HCPCS SELF ADMINISTERED DRUGS (ALT 637 FOR MEDICARE OP): Performed by: STUDENT IN AN ORGANIZED HEALTH CARE EDUCATION/TRAINING PROGRAM

## 2024-12-30 PROCEDURE — 2500000004 HC RX 250 GENERAL PHARMACY W/ HCPCS (ALT 636 FOR OP/ED): Performed by: STUDENT IN AN ORGANIZED HEALTH CARE EDUCATION/TRAINING PROGRAM

## 2024-12-30 RX ORDER — HYDROCODONE BITARTRATE AND ACETAMINOPHEN 5; 325 MG/1; MG/1
1 TABLET ORAL ONCE
Status: COMPLETED | OUTPATIENT
Start: 2024-12-30 | End: 2024-12-30

## 2024-12-30 RX ORDER — KETOROLAC TROMETHAMINE 15 MG/ML
15 INJECTION, SOLUTION INTRAMUSCULAR; INTRAVENOUS ONCE
Status: COMPLETED | OUTPATIENT
Start: 2024-12-30 | End: 2024-12-30

## 2024-12-30 RX ORDER — HYDROCODONE BITARTRATE AND ACETAMINOPHEN 5; 325 MG/1; MG/1
1 TABLET ORAL EVERY 6 HOURS PRN
Qty: 9 TABLET | Refills: 0 | Status: SHIPPED | OUTPATIENT
Start: 2024-12-30 | End: 2025-01-02

## 2024-12-30 RX ADMIN — IOHEXOL 100 ML: 350 INJECTION, SOLUTION INTRAVENOUS at 09:44

## 2024-12-30 RX ADMIN — HYDROCODONE BITARTRATE AND ACETAMINOPHEN 1 TABLET: 5; 325 TABLET ORAL at 12:29

## 2024-12-30 RX ADMIN — KETOROLAC TROMETHAMINE 15 MG: 15 INJECTION, SOLUTION INTRAMUSCULAR; INTRAVENOUS at 09:53

## 2024-12-30 ASSESSMENT — PAIN SCALES - GENERAL
PAINLEVEL_OUTOF10: 8
PAINLEVEL_OUTOF10: 8
PAINLEVEL_OUTOF10: 5 - MODERATE PAIN

## 2024-12-30 ASSESSMENT — LIFESTYLE VARIABLES
TOTAL SCORE: 0
EVER HAD A DRINK FIRST THING IN THE MORNING TO STEADY YOUR NERVES TO GET RID OF A HANGOVER: NO
HAVE YOU EVER FELT YOU SHOULD CUT DOWN ON YOUR DRINKING: NO
EVER FELT BAD OR GUILTY ABOUT YOUR DRINKING: NO
HAVE PEOPLE ANNOYED YOU BY CRITICIZING YOUR DRINKING: NO

## 2024-12-30 ASSESSMENT — PAIN DESCRIPTION - LOCATION
LOCATION: GENERALIZED
LOCATION: GENERALIZED

## 2024-12-30 ASSESSMENT — PAIN DESCRIPTION - PAIN TYPE: TYPE: ACUTE PAIN

## 2024-12-30 ASSESSMENT — PAIN - FUNCTIONAL ASSESSMENT
PAIN_FUNCTIONAL_ASSESSMENT: 0-10
PAIN_FUNCTIONAL_ASSESSMENT: 0-10

## 2024-12-30 ASSESSMENT — COLUMBIA-SUICIDE SEVERITY RATING SCALE - C-SSRS
2. HAVE YOU ACTUALLY HAD ANY THOUGHTS OF KILLING YOURSELF?: NO
6. HAVE YOU EVER DONE ANYTHING, STARTED TO DO ANYTHING, OR PREPARED TO DO ANYTHING TO END YOUR LIFE?: NO
1. IN THE PAST MONTH, HAVE YOU WISHED YOU WERE DEAD OR WISHED YOU COULD GO TO SLEEP AND NOT WAKE UP?: NO

## 2024-12-30 NOTE — ED TRIAGE NOTES
Pt BIB EMS from home for fall down 13 stairs s/p missing a step. Tumbled down stairs but was able to be helped to feet by EMS. Denies LOC, denies thinner use, is oriented and on RA, patent airway, +MSP to BUE, BLE. C/o left shoulder pain, head and neck pain, is in a collar by EMS. Abrasion to superior scalp - no active bleeding.

## 2025-01-01 NOTE — ED PROVIDER NOTES
Chief Complaint: Fall down steps  HPI: This is an 84-year-old male, presenting to the emergency department by EMS for evaluation after fall down 13 steps.  Patient states that his feet slipped at the top step and he tumbled down all 13 steps.  He is unsure if he hit his head, however denies any loss of consciousness.  He is complaining of left shoulder pain.  He was able to stand with help by EMS, however was not able to get up on his own.  He does also have an abrasion to the top of his head.    Past Medical History:   Diagnosis Date    Anxiety     BPH (benign prostatic hyperplasia)     CAD (coronary artery disease)     HLD (hyperlipidemia)     Lymphedema     lower extremities    Paroxysmal SVT (supraventricular tachycardia) (CMS-HCC)     Personal history of non-Hodgkin lymphomas 10/07/2021    History of lymphoma    S/P drug eluting coronary stent placement       Past Surgical History:   Procedure Laterality Date    CARDIAC CATHETERIZATION      CHOLECYSTECTOMY      CORONARY STENT PLACEMENT      US GUIDED BIOPSY LYMPH NODE SUPERFICIAL  12/14/2023    US GUIDED BIOPSY LYMPH NODE SUPERFICIAL 12/14/2023 [facility] US       Physical Exam  Vitals and nursing note reviewed.   Constitutional:       Appearance: Normal appearance.   HENT:      Head: Normocephalic.      Comments: Superficial abrasion to the top of the head     Mouth/Throat:      Mouth: Mucous membranes are moist.   Eyes:      Extraocular Movements: Extraocular movements intact.   Cardiovascular:      Pulses: Normal pulses.   Pulmonary:      Effort: Pulmonary effort is normal.   Abdominal:      General: Abdomen is flat.      Palpations: Abdomen is soft.      Tenderness: There is no abdominal tenderness.   Musculoskeletal:         General: Tenderness (Left clavicle) present.   Skin:     General: Skin is warm.   Neurological:      General: No focal deficit present.      Mental Status: He is alert.   Psychiatric:         Mood and Affect: Mood normal.            ED  Course/MDM  Diagnoses as of 01/01/25 1146   Fall, initial encounter   Closed nondisplaced fracture of left clavicle, unspecified part of clavicle, initial encounter       This is a 84 y.o. male presenting to the ED for evaluation after a fall down 13 steps.  Patient states that he slipped at the top step and fell down.  He is complaining of left shoulder pain as well as an abrasion to the top of his head.  On physical exam, the patient is resting comfortably in the bed, no acute distress.  Apart from the abrasion to the top of the head there are no other external signs of trauma.  He does have some tenderness to palpation over the left clavicle.  Neurovascularly intact distally.  Chest wall is nontender, no crepitus, deformity.  Abdomen is nontender.  Pelvis is nontender as well.  Given the mechanism of injury, the patient was pan scanned, and only traumatic injury found was a left clavicle fracture.  Patient was able to ambulate without difficulty in the department.  His left arm was placed in a sling and he was advised to follow-up with orthopedics.  He was advised to return to the emergency department for any new or worsening symptoms and was ultimately discharged home in stable condition.    Final Impression  1.  Fall  2.  Clavicle fracture  Disposition/Plan: Discharge home  Condition at disposition: Stable.     Genoveva Guardado DO  Emergency Medicine Physician     Genoveva Guardado DO  01/01/25 1149

## 2025-01-06 ENCOUNTER — APPOINTMENT (OUTPATIENT)
Dept: HEMATOLOGY/ONCOLOGY | Facility: CLINIC | Age: 85
End: 2025-01-06
Payer: MEDICARE

## 2025-01-16 ENCOUNTER — APPOINTMENT (OUTPATIENT)
Dept: HEMATOLOGY/ONCOLOGY | Facility: CLINIC | Age: 85
End: 2025-01-16
Payer: MEDICARE

## 2025-01-31 DIAGNOSIS — B02.9 HERPES ZOSTER WITHOUT COMPLICATION: ICD-10-CM

## 2025-02-03 RX ORDER — GABAPENTIN 600 MG/1
600 TABLET ORAL 3 TIMES DAILY
Qty: 90 TABLET | Refills: 1 | Status: SHIPPED | OUTPATIENT
Start: 2025-02-03

## 2025-02-25 ENCOUNTER — OFFICE VISIT (OUTPATIENT)
Dept: PAIN MEDICINE | Facility: CLINIC | Age: 85
End: 2025-02-25
Payer: MEDICARE

## 2025-02-25 VITALS
RESPIRATION RATE: 16 BRPM | SYSTOLIC BLOOD PRESSURE: 159 MMHG | OXYGEN SATURATION: 94 % | DIASTOLIC BLOOD PRESSURE: 69 MMHG | HEART RATE: 82 BPM

## 2025-02-25 DIAGNOSIS — B02.29 POST HERPETIC NEURALGIA: Primary | ICD-10-CM

## 2025-02-25 PROCEDURE — 99203 OFFICE O/P NEW LOW 30 MIN: CPT | Performed by: ANESTHESIOLOGY

## 2025-02-25 PROCEDURE — 1159F MED LIST DOCD IN RCRD: CPT | Performed by: ANESTHESIOLOGY

## 2025-02-25 PROCEDURE — 1125F AMNT PAIN NOTED PAIN PRSNT: CPT | Performed by: ANESTHESIOLOGY

## 2025-02-25 PROCEDURE — 3077F SYST BP >= 140 MM HG: CPT | Performed by: ANESTHESIOLOGY

## 2025-02-25 PROCEDURE — 3078F DIAST BP <80 MM HG: CPT | Performed by: ANESTHESIOLOGY

## 2025-02-25 PROCEDURE — 99213 OFFICE O/P EST LOW 20 MIN: CPT | Performed by: ANESTHESIOLOGY

## 2025-02-25 RX ORDER — CHOLECALCIFEROL (VITAMIN D3) 50 MCG
50 TABLET ORAL DAILY
Qty: 30 TABLET | Refills: 11 | Status: SHIPPED | OUTPATIENT
Start: 2025-02-25

## 2025-02-25 RX ORDER — LIDOCAINE 50 MG/G
1 PATCH TOPICAL DAILY
Qty: 30 PATCH | Refills: 11 | Status: SHIPPED | OUTPATIENT
Start: 2025-02-25

## 2025-02-25 ASSESSMENT — PAIN DESCRIPTION - DESCRIPTORS: DESCRIPTORS: BURNING

## 2025-02-25 ASSESSMENT — PAIN - FUNCTIONAL ASSESSMENT: PAIN_FUNCTIONAL_ASSESSMENT: 0-10

## 2025-02-25 ASSESSMENT — PAIN SCALES - GENERAL: PAINLEVEL_OUTOF10: 2

## 2025-02-25 NOTE — PROGRESS NOTES
Subjective   Patient ID: Angel Garibay is a 85 y.o. male with a past medical history of CAD, arrhythmia, HTN, HLD, who presents with abdominal pain     HPI:   85-year-old male presents with right flank and abdominal pain from shingles.  Patient first noticed rash on November 12, 2024 and thereafter had burning sharp pain in the right side of the back that radiates to right flank to abdominal area. Patient also had a fall down a full flight of stairs on 12/30/24 after which he sustained a left clavicle fracture. Patient was seen by ortho and managed conservatively. Was prescribed tramadol which helped with both the clavicular and shingles pain but caused an irregular heart beat. Patient currently taking gabapentin 600 mg TID which helps take edge off but does not get rid of pain. Pain can range anywhere from 2 to a 9 out of 10 in severity. Clavicular pain is currently controlled.     Review of Systems   13-point ROS done and negative except for HPI.     Current Outpatient Medications   Medication Instructions    atorvastatin (Lipitor) 40 mg tablet atorvastatin 40 mg tablet   take 1 tablet by mouth once daily    cholecalciferol (VITAMIN D3) 50 mcg, Daily    clorazepate (TRANXENE) 7.5 mg, oral, Nightly    gabapentin (NEURONTIN) 600 mg, oral, 3 times daily    losartan (COZAAR) 50 mg, oral, Daily       Past Medical History:   Diagnosis Date    Anxiety     BPH (benign prostatic hyperplasia)     CAD (coronary artery disease)     HLD (hyperlipidemia)     Lymphedema     lower extremities    Paroxysmal SVT (supraventricular tachycardia) (CMS-HCC)     Personal history of non-Hodgkin lymphomas 10/07/2021    History of lymphoma    S/P drug eluting coronary stent placement         Past Surgical History:   Procedure Laterality Date    CARDIAC CATHETERIZATION      CHOLECYSTECTOMY      CORONARY STENT PLACEMENT      US GUIDED BIOPSY LYMPH NODE SUPERFICIAL  12/14/2023    US GUIDED BIOPSY LYMPH NODE SUPERFICIAL 12/14/2023 [facility] US         No family history on file.     No Known Allergies     Objective     Vitals:    02/25/25 1359   BP: 159/69   Pulse: 82   Resp: 16   SpO2: 94%        Physical Exam  General: NAD, well groomed, well nourished  Eyes: Non-icteric sclera, EOMI  Ears, Nose, Mouth, and Throat: External ears and nose appear to be without deformity or rash. No lesions or masses noted. Hearing is grossly intact.   Neck: Trachea midline  Respiratory: Nonlabored breathing   Cardiovascular: no peripheral edema   Skin: Rash along right low back to right flank area in T10-T11 distribution    Back:   Palpation: No tenderness to palpation over lumbar paraspinous muscles.   Neurologic:   Cranial nerves grossly intact.   Strength 5/5 and symmetric plantar/dorsiflexion   Sensation: Normal to light touch throughout  Hypersensitivity to light touch on right T10-T11 distribution    Psychiatric: Alert, orientation to person, place, and time. Cooperative.    Imaging personally reviewed and independently interpreted    Assessment/Plan   85-year-old male who presents with right flank and abdominal pain due to shingles which started in November 2024.  Patient noticed a rash thereafter had burning and sharp pain along the right T10-T11 distribution.  On physical exam patient has a rash in this area and has hypersensitivity to light touch along this area more prominent on the lateral flank area.  Patient currently taking gabapentin 600 3 times daily which helps with the pain but does not completely get rid of pain.  Discussed thoracic epidural steroid injection as possible intervention with patient however patient would like to discuss with his wife before proceeding with the procedure.  Discussed risks, benefits and alternatives to procedure.    Plan:  - continue gabapentin 600 mg TID per PCP  - prescribe lidocaine patch and vitamin C 1 g daily  - discussed thoracic epidural steroid injection as possible intervention however patient would like to discuss  with wife before proceeding    The patient has failed treatment with : have significant limitations of their quality of life due to the pain    We discussed  the risks, benefits and alternatives of the procedure including but not limited to: , Lack of efficacy , Transiently worsening pain , Bleeding, Infection , and Nerve Damage    Follow up: After procedure    The patient was invited to contact us back anytime with any questions or concerns and follow-up with us in the office as needed.     Diagnoses and all orders for this visit:  Post herpetic neuralgia      This note was generated with the aid of dictation software, there may be typos despite my attempts at proofreading.     Patient seen and plan of care discussed with Dr. Anita Stanley MD  Pain Fellow

## 2025-03-17 DIAGNOSIS — B02.9 HERPES ZOSTER WITHOUT COMPLICATION: ICD-10-CM

## 2025-03-17 RX ORDER — GABAPENTIN 600 MG/1
600 TABLET ORAL 3 TIMES DAILY
Qty: 270 TABLET | Refills: 1 | Status: SHIPPED | OUTPATIENT
Start: 2025-03-17

## 2025-03-24 DIAGNOSIS — L03.90 CELLULITIS, UNSPECIFIED CELLULITIS SITE: Primary | ICD-10-CM

## 2025-03-24 RX ORDER — CEPHALEXIN 500 MG/1
500 CAPSULE ORAL 2 TIMES DAILY
Qty: 14 CAPSULE | Refills: 0 | Status: SHIPPED | OUTPATIENT
Start: 2025-03-24 | End: 2025-03-31 | Stop reason: SDUPTHER

## 2025-03-31 ENCOUNTER — TELEPHONE (OUTPATIENT)
Dept: CARDIOLOGY | Facility: HOSPITAL | Age: 85
End: 2025-03-31

## 2025-03-31 ENCOUNTER — OFFICE VISIT (OUTPATIENT)
Dept: CARDIOLOGY | Facility: HOSPITAL | Age: 85
End: 2025-03-31
Payer: MEDICARE

## 2025-03-31 VITALS
OXYGEN SATURATION: 96 % | SYSTOLIC BLOOD PRESSURE: 135 MMHG | DIASTOLIC BLOOD PRESSURE: 78 MMHG | WEIGHT: 224.87 LBS | HEART RATE: 96 BPM | BODY MASS INDEX: 32.27 KG/M2

## 2025-03-31 DIAGNOSIS — I10 HYPERTENSION, UNSPECIFIED TYPE: ICD-10-CM

## 2025-03-31 DIAGNOSIS — E78.5 HYPERLIPIDEMIA, UNSPECIFIED HYPERLIPIDEMIA TYPE: ICD-10-CM

## 2025-03-31 DIAGNOSIS — I89.0 LYMPHEDEMA: Primary | ICD-10-CM

## 2025-03-31 DIAGNOSIS — L03.90 CELLULITIS, UNSPECIFIED CELLULITIS SITE: ICD-10-CM

## 2025-03-31 PROCEDURE — 99204 OFFICE O/P NEW MOD 45 MIN: CPT | Performed by: NURSE PRACTITIONER

## 2025-03-31 PROCEDURE — 3075F SYST BP GE 130 - 139MM HG: CPT | Performed by: NURSE PRACTITIONER

## 2025-03-31 PROCEDURE — 99214 OFFICE O/P EST MOD 30 MIN: CPT | Performed by: NURSE PRACTITIONER

## 2025-03-31 PROCEDURE — 1036F TOBACCO NON-USER: CPT | Performed by: NURSE PRACTITIONER

## 2025-03-31 PROCEDURE — 3078F DIAST BP <80 MM HG: CPT | Performed by: NURSE PRACTITIONER

## 2025-03-31 PROCEDURE — 1159F MED LIST DOCD IN RCRD: CPT | Performed by: NURSE PRACTITIONER

## 2025-03-31 RX ORDER — CEPHALEXIN 500 MG/1
500 CAPSULE ORAL 3 TIMES DAILY
Qty: 21 CAPSULE | Refills: 0 | Status: SHIPPED | OUTPATIENT
Start: 2025-03-31 | End: 2025-04-07

## 2025-03-31 ASSESSMENT — ENCOUNTER SYMPTOMS
CONSTITUTIONAL NEGATIVE: 1
RESPIRATORY NEGATIVE: 1
PSYCHIATRIC NEGATIVE: 1
EYES NEGATIVE: 1
HEMATOLOGIC/LYMPHATIC NEGATIVE: 1
GASTROINTESTINAL NEGATIVE: 1
NEUROLOGICAL NEGATIVE: 1
MYALGIAS: 1
ENDOCRINE NEGATIVE: 1

## 2025-03-31 NOTE — PROGRESS NOTES
Referred by Dr. Zazueta ref. provider found for New Patient Visit (Lymphedema.)     History Of Present Illness:  Dear Dr. Adrien TONY had the pleasure of meeting Mr. Garibay today at [facility] for evaluation for lymphedema. The patient is seen in collaboration with Dr. Ochoa. Mr. Garibay is a very pleasant 85 year old gentleman with a history of lymphedema, anxiety, insomnia, HTN, CAD s/p PCI (2015), anemia, HLD, denies history of smoking. Denies family history of CAD. He states his lymphedema has been worse in the left leg. Fell a few weeks ago, has wound on his left leg. Denies chest pain or shortness of breath.     Review of Systems   Constitutional: Negative.   HENT: Negative.     Eyes: Negative.    Cardiovascular:  Positive for leg swelling.   Respiratory: Negative.     Endocrine: Negative.    Hematologic/Lymphatic: Negative.    Skin: Negative.    Musculoskeletal:  Positive for muscle weakness and myalgias.   Gastrointestinal: Negative.    Neurological: Negative.    Psychiatric/Behavioral: Negative.        Past Medical History:  He has a past medical history of Anxiety, BPH (benign prostatic hyperplasia), CAD (coronary artery disease), HLD (hyperlipidemia), Lymphedema, Paroxysmal SVT (supraventricular tachycardia) (CMS-HCC), Personal history of non-Hodgkin lymphomas (10/07/2021), and S/P drug eluting coronary stent placement.    Past Surgical History:  He has a past surgical history that includes US guided biopsy lymph node superficial (12/14/2023); Coronary stent placement; Cardiac catheterization; and Cholecystectomy.      Social History:  He reports that he has never smoked. He has never used smokeless tobacco. He reports that he does not drink alcohol and does not use drugs.    Family History:  No family history on file.     Allergies:  Patient has no known allergies.    Outpatient Medications:  Current Outpatient Medications   Medication Instructions    atorvastatin (Lipitor) 40  mg tablet atorvastatin 40 mg tablet   take 1 tablet by mouth once daily    cephalexin (KEFLEX) 500 mg, oral, 2 times daily    cholecalciferol (VITAMIN D3) 50 mcg, oral, Daily    clorazepate (TRANXENE) 7.5 mg, oral, Nightly    gabapentin (NEURONTIN) 600 mg, oral, 3 times daily    lidocaine (Lidoderm) 5 % patch 1 patch, transdermal, Daily, Remove & discard patch within 12 hours or as directed by MD.    losartan (COZAAR) 50 mg, oral, Daily        Last Recorded Vitals:  Vitals:    03/31/25 0803   BP: 135/78   Pulse: 96   SpO2: 96%   Weight: 102 kg (224 lb 13.9 oz)       Physical Exam:  Physical Exam  Vitals reviewed.   HENT:      Head: Normocephalic.      Nose: Nose normal.   Eyes:      Pupils: Pupils are equal, round, and reactive to light.   Cardiovascular:      Rate and Rhythm: Normal rate and regular rhythm.   Pulmonary:      Effort: Pulmonary effort is normal.      Breath sounds: Normal breath sounds.   Abdominal:      General: Abdomen is flat.      Palpations: Abdomen is soft.   Musculoskeletal:         General: Normal range of motion.      Cervical back: Normal range of motion.   Skin:     General: Skin is warm and dry.   Neurological:      General: No focal deficit present.      Mental Status: He is alert and oriented to person, place, and time.   Psychiatric:         Mood and Affect: Mood normal.       Extremities +2 pedal edema in the left leg   Physical Exam               Last Labs:  CBC -  Lab Results   Component Value Date    WBC 6.0 11/30/2024    HGB 13.9 11/30/2024    HCT 43.5 11/30/2024    MCV 95 11/30/2024     11/30/2024       CMP -  Lab Results   Component Value Date    CALCIUM 9.5 11/30/2024    PHOS 2.9 09/20/2024    PROT 6.6 11/30/2024    ALBUMIN 4.4 11/30/2024    AST 29 11/30/2024    ALT 26 11/30/2024    ALKPHOS 131 11/30/2024    BILITOT 0.8 11/30/2024       LIPID PANEL -   Lab Results   Component Value Date    CHOL 193 06/22/2023    TRIG 73 06/22/2023    HDL 67.9 06/22/2023    CHHDL 2.8  06/22/2023    LDLF 111 (H) 06/22/2023    VLDL 15 06/22/2023       RENAL FUNCTION PANEL -   Lab Results   Component Value Date    GLUCOSE 100 (H) 11/30/2024     11/30/2024    K 3.8 11/30/2024     11/30/2024    CO2 28 11/30/2024    ANIONGAP 15 11/30/2024    BUN 14 11/30/2024    CREATININE 0.82 11/30/2024    GFRMALE 85 06/22/2023    CALCIUM 9.5 11/30/2024    PHOS 2.9 09/20/2024    ALBUMIN 4.4 11/30/2024        Lab Results   Component Value Date    BNP 85 09/19/2024       Last Cardiology Tests:  ECG:  ECG 12 lead 09/19/2024 sinus rhythm with PACs heart rate 99 bpm   Echo:    Ejection Fractions:    Cath:    Stress Test:    Cardiac Imaging:        Assessment/Plan   Mr. Garibay is a very pleasant 85 year old gentleman with a history of HTN, HLD, CAD s/p PCI (2015) and lymphedema, complains of increased swelling in his left leg. He has an open wound on the left leg, has been there for three weeks, area is red and warm to touch. Will start on Keflex 500 mg three times a day. Encouraged to take Lasix 40 mg daily for three days. He will have an echocardiogram to assess his LV function. Heart rate and blood pressure are well controlled today     Plan   -call with any questions   -start Keflex 500 mg three times a day   -start Lasix 40 mg daily for three days   -continue Atorvastatin 40 mg daily and Losartan 50 mg daily   -referral to wound clinic   -referral to lymphedema clinic     I appreciate the opportunity to participate in the patient's care, please call with any questions         Reta Ortez, APRN-CNP

## 2025-03-31 NOTE — PATIENT INSTRUCTIONS
CALL WITH ANY QUESTIONS   FOLLOW UP IN THE WOUND CLINIC   KEFLEX 500 MG THREE TIMES A DAY FOR THE NEXT WEEK   FOLLOW UP IN THE LYMPHEDEMA CLINIC   TAKE LASIX ONCE A DAY FOR THREE DAYS

## 2025-03-31 NOTE — TELEPHONE ENCOUNTER
----- Message from Reta Foley sent at 3/31/2025  9:59 AM EDT -----  He said he has a stent he should be on Aspirin   Thanks  ----- Message -----  From: Katharina Bhandari RN  Sent: 3/31/2025   9:52 AM EDT  To: MARY BETH Molina    He is not taking aspirin  ----- Message -----  From: MARY BETH Molina  Sent: 3/31/2025   8:46 AM EDT  To: Katharina Bhandari RN    Can you call and see if he is taking Aspirin 81 mg daily   Thanks

## 2025-04-07 ENCOUNTER — OFFICE VISIT (OUTPATIENT)
Dept: WOUND CARE | Facility: HOSPITAL | Age: 85
End: 2025-04-07
Payer: MEDICARE

## 2025-04-07 DIAGNOSIS — L03.90 CELLULITIS, UNSPECIFIED CELLULITIS SITE: ICD-10-CM

## 2025-04-07 PROCEDURE — 99214 OFFICE O/P EST MOD 30 MIN: CPT | Performed by: SURGERY

## 2025-05-09 DIAGNOSIS — B02.9 HERPES ZOSTER WITHOUT COMPLICATION: ICD-10-CM

## 2025-05-12 RX ORDER — GABAPENTIN 600 MG/1
600 TABLET ORAL 3 TIMES DAILY
Qty: 270 TABLET | Refills: 1 | Status: SHIPPED | OUTPATIENT
Start: 2025-05-12

## (undated) DEVICE — COVER, CART, 45 X 27 X 48 IN, CLEAR

## (undated) DEVICE — SPONGE, HEMOSTAT, SURGICEL FIBRILLAR, ABS, 4 X 4, LF

## (undated) DEVICE — Device

## (undated) DEVICE — SUTURE, MONOCRYLIC, 4-0, P3, MONO 18

## (undated) DEVICE — BOWL, UTILITY, 32 OZ, PLASTIC, STERILE

## (undated) DEVICE — STAY SET, SURGICAL, 5MM SHARP HOOK, 8PK

## (undated) DEVICE — REST, HEAD, BAGEL, 9 IN

## (undated) DEVICE — MANIFOLD, 4 PORT NEPTUNE STANDARD

## (undated) DEVICE — SPONGE, LAP, XRAY DECT, 12IN X 12IN, W/MASTER DMT, STERILE

## (undated) DEVICE — SUTURE, VICRYL, 3-0,18 IN, SH, UNDYED